# Patient Record
Sex: MALE | Race: WHITE | Employment: UNEMPLOYED | URBAN - METROPOLITAN AREA
[De-identification: names, ages, dates, MRNs, and addresses within clinical notes are randomized per-mention and may not be internally consistent; named-entity substitution may affect disease eponyms.]

---

## 2017-08-23 PROBLEM — Z86.718 HISTORY OF DVT OF LOWER EXTREMITY: Status: ACTIVE | Noted: 2017-08-23

## 2017-08-23 PROBLEM — R06.02 SHORTNESS OF BREATH: Status: ACTIVE | Noted: 2017-08-23

## 2017-12-10 PROBLEM — I26.99 PE (PULMONARY THROMBOEMBOLISM) (HCC): Status: ACTIVE | Noted: 2017-12-10

## 2018-04-06 ENCOUNTER — APPOINTMENT (OUTPATIENT)
Dept: GENERAL RADIOLOGY | Age: 53
End: 2018-04-06
Payer: MEDICAID

## 2018-04-06 ENCOUNTER — HOSPITAL ENCOUNTER (EMERGENCY)
Age: 53
Discharge: HOME | End: 2018-04-07
Attending: EMERGENCY MEDICINE
Payer: MEDICAID

## 2018-04-06 DIAGNOSIS — R06.00 DYSPNEA, UNSPECIFIED TYPE: Primary | ICD-10-CM

## 2018-04-06 LAB
ALBUMIN SERPL-MCNC: 4.2 G/DL (ref 3.5–5.2)
ALP BLD-CCNC: 76 U/L (ref 40–129)
ALT SERPL-CCNC: 20 U/L (ref 0–40)
ANION GAP SERPL CALCULATED.3IONS-SCNC: 14 MMOL/L (ref 7–16)
AST SERPL-CCNC: 18 U/L (ref 0–39)
BACTERIA: ABNORMAL /HPF
BASOPHILS ABSOLUTE: 0.04 E9/L (ref 0–0.2)
BASOPHILS RELATIVE PERCENT: 0.4 % (ref 0–2)
BILIRUB SERPL-MCNC: 0.4 MG/DL (ref 0–1.2)
BILIRUBIN URINE: NEGATIVE
BLOOD, URINE: ABNORMAL
BUN BLDV-MCNC: 20 MG/DL (ref 6–20)
CALCIUM SERPL-MCNC: 9.6 MG/DL (ref 8.6–10.2)
CHLORIDE BLD-SCNC: 102 MMOL/L (ref 98–107)
CLARITY: CLEAR
CO2: 26 MMOL/L (ref 22–29)
COLOR: YELLOW
CREAT SERPL-MCNC: 1 MG/DL (ref 0.7–1.2)
EKG ATRIAL RATE: 85 BPM
EKG P AXIS: 38 DEGREES
EKG P-R INTERVAL: 170 MS
EKG Q-T INTERVAL: 388 MS
EKG QRS DURATION: 100 MS
EKG QTC CALCULATION (BAZETT): 461 MS
EKG R AXIS: 32 DEGREES
EKG T AXIS: 55 DEGREES
EKG VENTRICULAR RATE: 85 BPM
EOSINOPHILS ABSOLUTE: 0.48 E9/L (ref 0.05–0.5)
EOSINOPHILS RELATIVE PERCENT: 5.2 % (ref 0–6)
GFR AFRICAN AMERICAN: >60
GFR NON-AFRICAN AMERICAN: >60 ML/MIN/1.73
GLUCOSE BLD-MCNC: 117 MG/DL (ref 74–109)
GLUCOSE URINE: NEGATIVE MG/DL
HCT VFR BLD CALC: 43.5 % (ref 37–54)
HEMOGLOBIN: 14.6 G/DL (ref 12.5–16.5)
IMMATURE GRANULOCYTES #: 0.03 E9/L
IMMATURE GRANULOCYTES %: 0.3 % (ref 0–5)
INR BLD: 1
KETONES, URINE: NEGATIVE MG/DL
LEUKOCYTE ESTERASE, URINE: NEGATIVE
LYMPHOCYTES ABSOLUTE: 2.59 E9/L (ref 1.5–4)
LYMPHOCYTES RELATIVE PERCENT: 28.2 % (ref 20–42)
MCH RBC QN AUTO: 29 PG (ref 26–35)
MCHC RBC AUTO-ENTMCNC: 33.6 % (ref 32–34.5)
MCV RBC AUTO: 86.5 FL (ref 80–99.9)
MONOCYTES ABSOLUTE: 0.62 E9/L (ref 0.1–0.95)
MONOCYTES RELATIVE PERCENT: 6.8 % (ref 2–12)
NEUTROPHILS ABSOLUTE: 5.41 E9/L (ref 1.8–7.3)
NEUTROPHILS RELATIVE PERCENT: 59.1 % (ref 43–80)
NITRITE, URINE: NEGATIVE
PDW BLD-RTO: 13.7 FL (ref 11.5–15)
PH UA: 5.5 (ref 5–9)
PLATELET # BLD: 221 E9/L (ref 130–450)
PMV BLD AUTO: 8.5 FL (ref 7–12)
POTASSIUM SERPL-SCNC: 4.2 MMOL/L (ref 3.5–5)
PROTEIN UA: NEGATIVE MG/DL
PROTHROMBIN TIME: 11.4 SEC (ref 9.3–12.4)
RBC # BLD: 5.03 E12/L (ref 3.8–5.8)
RBC UA: ABNORMAL /HPF (ref 0–2)
SODIUM BLD-SCNC: 142 MMOL/L (ref 132–146)
SPECIFIC GRAVITY UA: >=1.03 (ref 1–1.03)
TOTAL PROTEIN: 7.2 G/DL (ref 6.4–8.3)
TROPONIN: <0.01 NG/ML (ref 0–0.03)
UROBILINOGEN, URINE: 0.2 E.U./DL
WBC # BLD: 9.2 E9/L (ref 4.5–11.5)
WBC UA: ABNORMAL /HPF (ref 0–5)

## 2018-04-06 PROCEDURE — 85025 COMPLETE CBC W/AUTO DIFF WBC: CPT

## 2018-04-06 PROCEDURE — 99285 EMERGENCY DEPT VISIT HI MDM: CPT

## 2018-04-06 PROCEDURE — 71046 X-RAY EXAM CHEST 2 VIEWS: CPT

## 2018-04-06 PROCEDURE — 36415 COLL VENOUS BLD VENIPUNCTURE: CPT

## 2018-04-06 PROCEDURE — 85610 PROTHROMBIN TIME: CPT

## 2018-04-06 PROCEDURE — 80053 COMPREHEN METABOLIC PANEL: CPT

## 2018-04-06 PROCEDURE — 81001 URINALYSIS AUTO W/SCOPE: CPT

## 2018-04-06 PROCEDURE — 84484 ASSAY OF TROPONIN QUANT: CPT

## 2018-04-07 ENCOUNTER — APPOINTMENT (OUTPATIENT)
Dept: ULTRASOUND IMAGING | Age: 53
End: 2018-04-07
Payer: MEDICAID

## 2018-04-07 ENCOUNTER — APPOINTMENT (OUTPATIENT)
Dept: CT IMAGING | Age: 53
End: 2018-04-07
Payer: MEDICAID

## 2018-04-07 VITALS
TEMPERATURE: 98.1 F | WEIGHT: 300 LBS | HEIGHT: 77 IN | BODY MASS INDEX: 35.42 KG/M2 | SYSTOLIC BLOOD PRESSURE: 132 MMHG | DIASTOLIC BLOOD PRESSURE: 97 MMHG | HEART RATE: 77 BPM | OXYGEN SATURATION: 95 % | RESPIRATION RATE: 18 BRPM

## 2018-04-07 PROCEDURE — 2580000003 HC RX 258: Performed by: RADIOLOGY

## 2018-04-07 PROCEDURE — 6360000002 HC RX W HCPCS

## 2018-04-07 PROCEDURE — 96374 THER/PROPH/DIAG INJ IV PUSH: CPT

## 2018-04-07 PROCEDURE — 93970 EXTREMITY STUDY: CPT

## 2018-04-07 PROCEDURE — 6370000000 HC RX 637 (ALT 250 FOR IP): Performed by: NURSE PRACTITIONER

## 2018-04-07 PROCEDURE — 6360000004 HC RX CONTRAST MEDICATION: Performed by: RADIOLOGY

## 2018-04-07 PROCEDURE — 71275 CT ANGIOGRAPHY CHEST: CPT

## 2018-04-07 RX ORDER — ONDANSETRON 2 MG/ML
INJECTION INTRAMUSCULAR; INTRAVENOUS
Status: COMPLETED
Start: 2018-04-07 | End: 2018-04-07

## 2018-04-07 RX ORDER — SODIUM CHLORIDE 0.9 % (FLUSH) 0.9 %
10 SYRINGE (ML) INJECTION PRN
Status: COMPLETED | OUTPATIENT
Start: 2018-04-07 | End: 2018-04-07

## 2018-04-07 RX ORDER — ONDANSETRON 2 MG/ML
4 INJECTION INTRAMUSCULAR; INTRAVENOUS ONCE
Status: COMPLETED | OUTPATIENT
Start: 2018-04-07 | End: 2018-04-07

## 2018-04-07 RX ADMIN — Medication 10 ML: at 00:52

## 2018-04-07 RX ADMIN — IOPAMIDOL 75 ML: 755 INJECTION, SOLUTION INTRAVENOUS at 00:52

## 2018-04-07 RX ADMIN — ONDANSETRON 4 MG: 2 INJECTION INTRAMUSCULAR; INTRAVENOUS at 01:05

## 2018-04-07 RX ADMIN — APIXABAN 10 MG: 5 TABLET, FILM COATED ORAL at 03:06

## 2018-06-26 ENCOUNTER — OFFICE VISIT (OUTPATIENT)
Dept: VASCULAR SURGERY | Age: 53
End: 2018-06-26
Payer: MEDICAID

## 2018-06-26 DIAGNOSIS — Z86.718 HISTORY OF DVT OF LOWER EXTREMITY: Primary | ICD-10-CM

## 2018-06-26 DIAGNOSIS — I26.99 PE (PULMONARY THROMBOEMBOLISM) (HCC): ICD-10-CM

## 2018-06-26 PROCEDURE — 1036F TOBACCO NON-USER: CPT | Performed by: SURGERY

## 2018-06-26 PROCEDURE — 99203 OFFICE O/P NEW LOW 30 MIN: CPT | Performed by: SURGERY

## 2018-06-26 PROCEDURE — 3017F COLORECTAL CA SCREEN DOC REV: CPT | Performed by: SURGERY

## 2018-06-26 PROCEDURE — G8427 DOCREV CUR MEDS BY ELIG CLIN: HCPCS | Performed by: SURGERY

## 2018-06-26 PROCEDURE — G8417 CALC BMI ABV UP PARAM F/U: HCPCS | Performed by: SURGERY

## 2018-07-23 ENCOUNTER — HOSPITAL ENCOUNTER (OUTPATIENT)
Dept: GENERAL RADIOLOGY | Age: 53
Discharge: HOME | End: 2018-07-25
Payer: MEDICAID

## 2018-07-23 DIAGNOSIS — N64.4 BREAST PAIN: ICD-10-CM

## 2018-07-23 DIAGNOSIS — N63.20 LUMP OF LEFT BREAST: ICD-10-CM

## 2018-07-23 PROCEDURE — 76642 ULTRASOUND BREAST LIMITED: CPT

## 2018-07-23 PROCEDURE — 77066 DX MAMMO INCL CAD BI: CPT

## 2018-08-22 ENCOUNTER — TELEPHONE (OUTPATIENT)
Dept: INTERNAL MEDICINE | Age: 53
End: 2018-08-22

## 2018-10-23 ENCOUNTER — HOSPITAL ENCOUNTER (OUTPATIENT)
Dept: SLEEP CENTER | Age: 53
Discharge: HOME | End: 2018-10-23
Payer: MEDICAID

## 2018-10-23 DIAGNOSIS — E66.9 CLASS 2 OBESITY WITH BODY MASS INDEX (BMI) OF 36.0 TO 36.9 IN ADULT, UNSPECIFIED OBESITY TYPE, UNSPECIFIED WHETHER SERIOUS COMORBIDITY PRESENT: ICD-10-CM

## 2018-10-23 DIAGNOSIS — R06.02 SHORTNESS OF BREATH: ICD-10-CM

## 2018-10-23 DIAGNOSIS — R06.89 GASPING FOR BREATH: ICD-10-CM

## 2018-10-23 DIAGNOSIS — F32.A DEPRESSION, UNSPECIFIED DEPRESSION TYPE: ICD-10-CM

## 2018-10-23 DIAGNOSIS — R06.83 SNORES: ICD-10-CM

## 2018-10-23 DIAGNOSIS — I10 HYPERTENSION, UNSPECIFIED TYPE: ICD-10-CM

## 2018-10-23 DIAGNOSIS — M50.30 BULGE OF CERVICAL DISC WITHOUT MYELOPATHY: Chronic | ICD-10-CM

## 2018-10-23 DIAGNOSIS — J44.9 CHRONIC OBSTRUCTIVE PULMONARY DISEASE, UNSPECIFIED COPD TYPE (HCC): Primary | ICD-10-CM

## 2018-10-23 PROCEDURE — 95811 POLYSOM 6/>YRS CPAP 4/> PARM: CPT

## 2018-10-24 VITALS
HEART RATE: 79 BPM | SYSTOLIC BLOOD PRESSURE: 154 MMHG | WEIGHT: 315 LBS | OXYGEN SATURATION: 92 % | BODY MASS INDEX: 37.19 KG/M2 | HEIGHT: 77 IN | DIASTOLIC BLOOD PRESSURE: 92 MMHG

## 2018-10-24 ASSESSMENT — SLEEP AND FATIGUE QUESTIONNAIRES
HOW LIKELY ARE YOU TO NOD OFF OR FALL ASLEEP IN A CAR, WHILE STOPPED FOR A FEW MINUTES IN TRAFFIC: 0
HOW LIKELY ARE YOU TO NOD OFF OR FALL ASLEEP WHILE SITTING QUIETLY AFTER LUNCH WITHOUT ALCOHOL: 2
HOW LIKELY ARE YOU TO NOD OFF OR FALL ASLEEP WHILE SITTING INACTIVE IN A PUBLIC PLACE: 1
HOW LIKELY ARE YOU TO NOD OFF OR FALL ASLEEP WHILE LYING DOWN TO REST IN THE AFTERNOON WHEN CIRCUMSTANCES PERMIT: 3
HOW LIKELY ARE YOU TO NOD OFF OR FALL ASLEEP WHEN YOU ARE A PASSENGER IN A CAR FOR AN HOUR WITHOUT A BREAK: 1
HOW LIKELY ARE YOU TO NOD OFF OR FALL ASLEEP WHILE SITTING AND READING: 2
HOW LIKELY ARE YOU TO NOD OFF OR FALL ASLEEP WHILE SITTING AND TALKING TO SOMEONE: 0
HOW LIKELY ARE YOU TO NOD OFF OR FALL ASLEEP WHILE WATCHING TV: 2
ESS TOTAL SCORE: 11

## 2018-10-29 NOTE — PROGRESS NOTES
[address] [address]                              SLEEP STUDY REPORT    PATIENT NAME: Ryley Huang                    :         1965  MED REC NO:   [id number]                            ROOM:  ACCOUNT NO:   [de-identified]                           ADMIT DATE:  10/23/2018  PROVIDER:     Iza Hargrove MD    DATE OF STUDY:  10/23/2018    ORDERING PHYSICIAN:  _____, nurse practitioner. The patient is 50year-old, 77 inches tall, 329 pounds. BMI is 39. The patient has known obstructive sleep apnea, difficulty falling  asleep and maintaining sleep. He has history of snoring, gasping,  nonrestorative sleep, excessive napping, trouble with memory, back  twitches, sleep walking, talking, waking confused, vivid dreams with  racing thoughts, falling asleep on the phone. The patient is on  albuterol, Eliquis, Abilify, Breo, Pulmicort, BuSpar, Zyrtec, Flonase,  Neurontin, Atrovent, lisinopril, Dulera, Ultram, and Ventolin. REPORT IN DETAIL:  This part of the study was done in two parts, one  for the diagnosis and the second was treatment. DIAGNOSTIC STUDY:  Total recording time 139 minutes, out of which  total sleep time was 132 minutes resulting in sleep efficiency of 95%. The patient had sleep onset of 0.3 minutes, was awake for another 7.3  minutes after sleep onset. REM latency was markedly short at 53  minutes. The patient spent 16% of the study in stage I sleep, 72% in  stage II sleep, 1% in stage III sleep, 11% in REM sleep. SLEEP-DISORDERED BREATHING:  The patient had a 126 events during the  night, which consisted of 7 obstructive apneas, 1 mixed apnea, 117  hypopneas for an apnea index of 3.7. Hypopnea index of 53.4. Total  apnea/hypopnea index was 57.5. These events lasted anywhere from 25  seconds to 64 seconds in duration. REM index was 64.   The patient  slept in the supine position for only 8

## 2018-11-05 ENCOUNTER — HOSPITAL ENCOUNTER (EMERGENCY)
Age: 53
Discharge: HOME | End: 2018-11-05
Payer: MEDICAID

## 2018-11-05 ENCOUNTER — APPOINTMENT (OUTPATIENT)
Dept: GENERAL RADIOLOGY | Age: 53
End: 2018-11-05
Payer: MEDICAID

## 2018-11-05 VITALS
HEART RATE: 96 BPM | TEMPERATURE: 98.3 F | RESPIRATION RATE: 18 BRPM | SYSTOLIC BLOOD PRESSURE: 118 MMHG | DIASTOLIC BLOOD PRESSURE: 73 MMHG | HEIGHT: 77 IN | BODY MASS INDEX: 37.19 KG/M2 | OXYGEN SATURATION: 94 % | WEIGHT: 315 LBS

## 2018-11-05 DIAGNOSIS — S93.602A FOOT SPRAIN, LEFT, INITIAL ENCOUNTER: Primary | ICD-10-CM

## 2018-11-05 PROCEDURE — 6370000000 HC RX 637 (ALT 250 FOR IP): Performed by: NURSE PRACTITIONER

## 2018-11-05 PROCEDURE — 73630 X-RAY EXAM OF FOOT: CPT

## 2018-11-05 PROCEDURE — 73610 X-RAY EXAM OF ANKLE: CPT

## 2018-11-05 PROCEDURE — 99283 EMERGENCY DEPT VISIT LOW MDM: CPT

## 2018-11-05 RX ORDER — NAPROXEN 500 MG/1
500 TABLET ORAL 2 TIMES DAILY
Qty: 14 TABLET | Refills: 0 | Status: ON HOLD | OUTPATIENT
Start: 2018-11-05 | End: 2020-09-13 | Stop reason: HOSPADM

## 2018-11-05 RX ORDER — IBUPROFEN 800 MG/1
800 TABLET ORAL ONCE
Status: COMPLETED | OUTPATIENT
Start: 2018-11-05 | End: 2018-11-05

## 2018-11-05 RX ADMIN — IBUPROFEN 800 MG: 800 TABLET ORAL at 16:03

## 2018-11-05 ASSESSMENT — PAIN DESCRIPTION - FREQUENCY
FREQUENCY: CONTINUOUS
FREQUENCY: CONTINUOUS

## 2018-11-05 ASSESSMENT — PAIN DESCRIPTION - ORIENTATION
ORIENTATION: LEFT
ORIENTATION: LEFT

## 2018-11-05 ASSESSMENT — PAIN DESCRIPTION - PAIN TYPE
TYPE: ACUTE PAIN
TYPE: ACUTE PAIN

## 2018-11-05 ASSESSMENT — PAIN DESCRIPTION - DESCRIPTORS
DESCRIPTORS: ACHING
DESCRIPTORS: ACHING

## 2018-11-05 ASSESSMENT — PAIN DESCRIPTION - PROGRESSION: CLINICAL_PROGRESSION: GRADUALLY IMPROVING

## 2018-11-05 ASSESSMENT — PAIN DESCRIPTION - LOCATION
LOCATION: ANKLE
LOCATION: ANKLE;FOOT

## 2018-11-05 ASSESSMENT — PAIN SCALES - GENERAL
PAINLEVEL_OUTOF10: 7
PAINLEVEL_OUTOF10: 5

## 2018-11-06 NOTE — ED PROVIDER NOTES
[facility]     Department of Emergency Medicine   ED  Provider Note  Admit Date/RoomTime: 11/5/2018  3:17 PM  ED Room: 06/06   Chief Complaint   Fall (stepped off a curb and fell, injured left foot and ankle onset 2 weeks ago.)    History of Present Illness   Source of history provided by:  patient. History/Exam Limitations: none. Erica Herrera is a 50 y.o. old male presenting to the emergency department by private vehicle, for Left foot pain which occured 2 week(s) prior to arrival.  Cause of complaint: inversion injury to foot while at home. There has been a history of no prior problems with this area in the past.  Since onset the symptoms have been moderate in degree with ability to bear weight, but with some pain and swelling. His pain is aggraveated by walking and relieved by nothing but has not taken anything. Tetanus Status: unknown. ROS    Pertinent positives and negatives are stated within HPI, all other systems reviewed and are negative. Past Surgical History:   Procedure Laterality Date    ARTERY BIOPSY  09/11    Aorta  fibrosis    CARDIAC CATHETERIZATION  2002    Negative    CARDIOVASCULAR STRESS TEST  2002    positive heart cath OK      NERVE BLOCK N/A 1/9/15    cervical epidural #2    NERVE BLOCK  01/23/15    cervical radiculitis    OTHER SURGICAL HISTORY N/A 12/19/14    cervical epidural nerve block under xray #1    UVULOPALATOPHARYGOPLASTY  2000   Social History:  reports that he quit smoking about 4 months ago. His smoking use included Cigarettes. He has a 52.00 pack-year smoking history. He has quit using smokeless tobacco. He reports that he uses drugs, including  and Marijuana. He reports that he does not drink alcohol. Family History: family history includes Cancer in his mother; Diabetes in his brother and mother; Heart Disease in his brother, father, and mother; Kidney Disease in his sister; Stroke in his mother.   Allergies: Patient has no known allergies. Physical Exam           ED Triage Vitals   BP Temp Temp Source Pulse Resp SpO2 Height Weight   11/05/18 1523 11/05/18 1523 11/05/18 1523 11/05/18 1523 11/05/18 1523 11/05/18 1523 11/05/18 1522 11/05/18 1522   (!) 210/120 98.3 °F (36.8 °C) Oral 84 17 93 % 6' 5\" (1.956 m) (!) 320 lb (145.2 kg)      Oxygen Saturation Interpretation: Normal.    Constitutional:  Alert, development consistent with age. Neck:  Normal ROM. Supple. Foot: Left medial and plantar 1st , 2nd, 3rd metatarsal(s). Tenderness:  mild. Swelling: Mild. Deformity: no.              ROM: full range with pain. Skin:  tenderness, swelling and no erythema, rash or wounds noted. Neurovascular: Motor deficit: none. Sensory deficit:   none. Pulse deficit: none. Capillary refill: normal.  Ankle:               Tenderness:  none. Swelling: None. Deformity: no.             ROM: full range of motion. Skin:  no erythema, rash or wounds noted. Gait:  limp. Lymphatics: No lymphangitis or adenopathy noted. Neurological:  Oriented. Motor functions intact. Lab / Imaging Results   (All laboratory and radiology results have been personally reviewed by myself)  Labs:  No results found for this visit on 11/05/18. Imaging: All Radiology results interpreted by Radiologist unless otherwise noted. XR FOOT LEFT (MIN 3 VIEWS)   Final Result   Negative. XR ANKLE LEFT (MIN 3 VIEWS)   Final Result   No acute fracture or malalignment      Mild bimalleolar soft tissue swelling. ED Course / Medical Decision Making     Medications   ibuprofen (ADVIL;MOTRIN) tablet 800 mg (800 mg Oral Given 11/5/18 1603)     ED Course as of Nov 06 1140   Mon Nov 05, 2018   1649 Sleeping, easily aroused. Some improvement in symptoms. Discussed results and plan.    [JG]      ED Course User Index  [JG] ALIE Berumen NP

## 2019-07-26 ENCOUNTER — HOSPITAL ENCOUNTER (EMERGENCY)
Age: 54
Discharge: HOME | End: 2019-07-27
Payer: MEDICAID

## 2019-07-26 DIAGNOSIS — F11.90 METHADONE USE: ICD-10-CM

## 2019-07-26 DIAGNOSIS — F11.10 OPIATE ABUSE, EPISODIC (HCC): ICD-10-CM

## 2019-07-26 DIAGNOSIS — G47.9 SLEEP DISTURBANCE: Primary | ICD-10-CM

## 2019-07-26 DIAGNOSIS — F14.10 COCAINE ABUSE (HCC): ICD-10-CM

## 2019-07-26 DIAGNOSIS — F15.10 AMPHETAMINE ABUSE (HCC): ICD-10-CM

## 2019-07-26 DIAGNOSIS — G47.30 SLEEP APNEA, UNSPECIFIED TYPE: ICD-10-CM

## 2019-07-26 PROCEDURE — 99284 EMERGENCY DEPT VISIT MOD MDM: CPT

## 2019-07-27 ENCOUNTER — APPOINTMENT (OUTPATIENT)
Dept: GENERAL RADIOLOGY | Age: 54
End: 2019-07-27
Payer: MEDICAID

## 2019-07-27 ENCOUNTER — APPOINTMENT (OUTPATIENT)
Dept: CT IMAGING | Age: 54
End: 2019-07-27
Payer: MEDICAID

## 2019-07-27 VITALS
HEART RATE: 86 BPM | RESPIRATION RATE: 20 BRPM | HEIGHT: 77 IN | OXYGEN SATURATION: 94 % | DIASTOLIC BLOOD PRESSURE: 88 MMHG | SYSTOLIC BLOOD PRESSURE: 155 MMHG | TEMPERATURE: 98.1 F | WEIGHT: 315 LBS | BODY MASS INDEX: 37.19 KG/M2

## 2019-07-27 LAB
ACETAMINOPHEN LEVEL: <5 MCG/ML (ref 10–30)
ALBUMIN SERPL-MCNC: 3.9 G/DL (ref 3.5–5.2)
ALP BLD-CCNC: 72 U/L (ref 40–129)
ALT SERPL-CCNC: 36 U/L (ref 0–40)
AMPHETAMINE SCREEN, URINE: POSITIVE
ANION GAP SERPL CALCULATED.3IONS-SCNC: 9 MMOL/L (ref 7–16)
AST SERPL-CCNC: 33 U/L (ref 0–39)
BACTERIA: NORMAL /HPF
BARBITURATE SCREEN URINE: NOT DETECTED
BASOPHILS ABSOLUTE: 0.04 E9/L (ref 0–0.2)
BASOPHILS RELATIVE PERCENT: 0.3 % (ref 0–2)
BENZODIAZEPINE SCREEN, URINE: NOT DETECTED
BILIRUB SERPL-MCNC: 0.4 MG/DL (ref 0–1.2)
BILIRUBIN URINE: NEGATIVE
BLOOD, URINE: NORMAL
BUN BLDV-MCNC: 18 MG/DL (ref 6–20)
CALCIUM SERPL-MCNC: 9 MG/DL (ref 8.6–10.2)
CANNABINOID SCREEN URINE: NOT DETECTED
CHLORIDE BLD-SCNC: 103 MMOL/L (ref 98–107)
CLARITY: CLEAR
CO2: 27 MMOL/L (ref 22–29)
COCAINE METABOLITE SCREEN URINE: POSITIVE
COLOR: YELLOW
CREAT SERPL-MCNC: 1 MG/DL (ref 0.7–1.2)
EOSINOPHILS ABSOLUTE: 0.55 E9/L (ref 0.05–0.5)
EOSINOPHILS RELATIVE PERCENT: 4.8 % (ref 0–6)
ETHANOL: <10 MG/DL (ref 0–0.08)
GFR AFRICAN AMERICAN: >60
GFR NON-AFRICAN AMERICAN: >60 ML/MIN/1.73
GLUCOSE BLD-MCNC: 100 MG/DL (ref 74–99)
GLUCOSE URINE: NEGATIVE MG/DL
HCT VFR BLD CALC: 39.3 % (ref 37–54)
HEMOGLOBIN: 12.8 G/DL (ref 12.5–16.5)
IMMATURE GRANULOCYTES #: 0.03 E9/L
IMMATURE GRANULOCYTES %: 0.3 % (ref 0–5)
KETONES, URINE: NEGATIVE MG/DL
LEUKOCYTE ESTERASE, URINE: NEGATIVE
LYMPHOCYTES ABSOLUTE: 2.78 E9/L (ref 1.5–4)
LYMPHOCYTES RELATIVE PERCENT: 24.2 % (ref 20–42)
MCH RBC QN AUTO: 29.4 PG (ref 26–35)
MCHC RBC AUTO-ENTMCNC: 32.6 % (ref 32–34.5)
MCV RBC AUTO: 90.1 FL (ref 80–99.9)
METHADONE SCREEN, URINE: POSITIVE
MONOCYTES ABSOLUTE: 0.71 E9/L (ref 0.1–0.95)
MONOCYTES RELATIVE PERCENT: 6.2 % (ref 2–12)
NEUTROPHILS ABSOLUTE: 7.39 E9/L (ref 1.8–7.3)
NEUTROPHILS RELATIVE PERCENT: 64.2 % (ref 43–80)
NITRITE, URINE: NEGATIVE
OPIATE SCREEN URINE: POSITIVE
PDW BLD-RTO: 14 FL (ref 11.5–15)
PH UA: 6 (ref 5–9)
PHENCYCLIDINE SCREEN URINE: NOT DETECTED
PLATELET # BLD: 225 E9/L (ref 130–450)
PMV BLD AUTO: 9 FL (ref 7–12)
POTASSIUM SERPL-SCNC: 4.5 MMOL/L (ref 3.5–5)
PRO-BNP: 273 PG/ML (ref 0–125)
PROPOXYPHENE SCREEN: NOT DETECTED
PROTEIN UA: NEGATIVE MG/DL
RBC # BLD: 4.36 E12/L (ref 3.8–5.8)
RBC UA: NORMAL /HPF (ref 0–2)
RENAL EPITHELIAL, UA: NORMAL /HPF
SALICYLATE, SERUM: <0.3 MG/DL (ref 0–30)
SODIUM BLD-SCNC: 139 MMOL/L (ref 132–146)
SPECIFIC GRAVITY UA: 1.01 (ref 1–1.03)
TOTAL PROTEIN: 7 G/DL (ref 6.4–8.3)
TRICYCLIC ANTIDEPRESSANTS SCREEN SERUM: NEGATIVE NG/ML
TROPONIN: <0.01 NG/ML (ref 0–0.03)
TSH SERPL DL<=0.05 MIU/L-ACNC: 0.73 UIU/ML (ref 0.27–4.2)
UROBILINOGEN, URINE: 0.2 E.U./DL
WBC # BLD: 11.5 E9/L (ref 4.5–11.5)
WBC UA: NORMAL /HPF (ref 0–5)

## 2019-07-27 PROCEDURE — 84443 ASSAY THYROID STIM HORMONE: CPT

## 2019-07-27 PROCEDURE — 84484 ASSAY OF TROPONIN QUANT: CPT

## 2019-07-27 PROCEDURE — G0480 DRUG TEST DEF 1-7 CLASSES: HCPCS

## 2019-07-27 PROCEDURE — 80053 COMPREHEN METABOLIC PANEL: CPT

## 2019-07-27 PROCEDURE — 85025 COMPLETE CBC W/AUTO DIFF WBC: CPT

## 2019-07-27 PROCEDURE — 81001 URINALYSIS AUTO W/SCOPE: CPT

## 2019-07-27 PROCEDURE — 93005 ELECTROCARDIOGRAM TRACING: CPT | Performed by: NURSE PRACTITIONER

## 2019-07-27 PROCEDURE — 83880 ASSAY OF NATRIURETIC PEPTIDE: CPT

## 2019-07-27 PROCEDURE — 80307 DRUG TEST PRSMV CHEM ANLYZR: CPT

## 2019-07-27 PROCEDURE — 71045 X-RAY EXAM CHEST 1 VIEW: CPT

## 2019-07-27 PROCEDURE — 70450 CT HEAD/BRAIN W/O DYE: CPT

## 2019-07-27 NOTE — ED PROVIDER NOTES
Anion Gap 9 7 - 16 mmol/L    Glucose 100 (H) 74 - 99 mg/dL    BUN 18 6 - 20 mg/dL    CREATININE 1.0 0.7 - 1.2 mg/dL    GFR Non-African American >60 >=60 mL/min/1.73    GFR African American >60     Calcium 9.0 8.6 - 10.2 mg/dL    Total Protein 7.0 6.4 - 8.3 g/dL    Alb 3.9 3.5 - 5.2 g/dL    Total Bilirubin 0.4 0.0 - 1.2 mg/dL    Alkaline Phosphatase 72 40 - 129 U/L    ALT 36 0 - 40 U/L    AST 33 0 - 39 U/L   Brain Natriuretic Peptide   Result Value Ref Range    Pro- (H) 0 - 125 pg/mL   Urinalysis   Result Value Ref Range    Color, UA Yellow Straw/Yellow    Clarity, UA Clear Clear    Glucose, Ur Negative Negative mg/dL    Bilirubin Urine Negative Negative    Ketones, Urine Negative Negative mg/dL    Specific Gravity, UA 1.015 1.005 - 1.030    Blood, Urine TRACE-INTACT Negative    pH, UA 6.0 5.0 - 9.0    Protein, UA Negative Negative mg/dL    Urobilinogen, Urine 0.2 <2.0 E.U./dL    Nitrite, Urine Negative Negative    Leukocyte Esterase, Urine Negative Negative   TSH without Reflex   Result Value Ref Range    TSH 0.735 0.270 - 4.200 uIU/mL   Urine Drug Screen   Result Value Ref Range    Amphetamine Screen, Urine POSITIVE (A) Negative <1000 ng/mL    Barbiturate Screen, Ur NOT DETECTED Negative < 200 ng/mL    Benzodiazepine Screen, Urine NOT DETECTED Negative < 200 ng/mL    Cannabinoid Scrn, Ur NOT DETECTED Negative < 50ng/mL    Cocaine Metabolite Screen, Urine POSITIVE (A) Negative < 300 ng/mL    Opiate Scrn, Ur POSITIVE (A) Negative < 300ng/mL    PCP Screen, Urine NOT DETECTED Negative < 25 ng/mL    Methadone Screen, Urine POSITIVE (A) Negative <300 ng/mL    Propoxyphene Scrn, Ur NOT DETECTED Negative <300 ng/mL   Serum Drug Screen   Result Value Ref Range    Ethanol Lvl <10 mg/dL    Acetaminophen Level <5.0 (L) 10.0 - 28.2 mcg/mL    Salicylate, Serum <4.9 0.0 - 30.0 mg/dL   Microscopic Urinalysis   Result Value Ref Range    WBC, UA 0-1 0 - 5 /HPF    RBC, UA 1-3 0 - 2 /HPF    Renal Epithelial, Urine NONE /HPF Bacteria, UA NONE /HPF   EKG 12 Lead   Result Value Ref Range    Ventricular Rate 69 BPM    Atrial Rate 69 BPM    P-R Interval 172 ms    QRS Duration 96 ms    Q-T Interval 420 ms    QTc Calculation (Bazett) 450 ms    P Axis 23 degrees    R Axis 14 degrees    T Axis 29 degrees       RADIOLOGY:  Interpreted by Radiologist.  CT Head WO Contrast   Final Result   Normal.       This report has been electronically signed by Nereida Ceballos MD.      XR CHEST PORTABLE   Final Result   No acute cardiac pulmonary process. ------------------------- NURSING NOTES AND VITALS REVIEWED ---------------------------   The nursing notes within the ED encounter and vital signs as below have been reviewed. BP (!) 141/90   Pulse 68   Temp 98.1 °F (36.7 °C) (Oral)   Resp 20   Ht 6' 5\" (1.956 m)   Wt (!) 345 lb (156.5 kg)   SpO2 95%   BMI 40.91 kg/m²   Oxygen Saturation Interpretation: Normal      ---------------------------------------------------PHYSICAL EXAM--------------------------------------      Constitutional/General: Alert and oriented x3, well appearing, non toxic in NAD  Head: Normocephalic and atraumatic  Eyes: PERRL, EOMI  Mouth: Oropharynx clear, handling secretions, no trismus  Neck: Supple, full ROM,   Pulmonary: Lungs clear to auscultation bilaterally, no wheezes, rales, or rhonchi. Not in respiratory distress, , No wheezing no stridor noted. No airway compromise noted. Cardiovascular:  Regular rate and rhythm, no murmurs, gallops, or rubs. 2+ distal pulses  Abdomen: Soft, non tender, non distended,   Extremities: Moves all extremities x 4. Warm and well perfused  Skin: warm and dry without rash  Neurologic: GCS 15,  Psych: Restless Affect, noted to be sleeping in short intervals here in the emergency department then will wake it up stating that he has not slept yet. Redirection provided.   Denies suicidal homicidal ideations denies any hallucinations.      ------------------------------ ED COURSE/MEDICAL DECISION MAKING----------------------  Medications - No data to display      ED COURSE:       Medical Decision Making: Patient with inability to sleep. No noted complaints of chest pain, shortness of breath or abdominal pain denies any psychiatric concerns any stressors. Reports recent setting changes on his CPAP and thinks it may be related to this. Patient denies any recent long travel. Plan will be for labs. Urinalysis negative, troponin negative, CBC negative, TSH normal, proBNP essentially unremarkable at 273, serum drug screen negative. CT scan of the brain is negative, chest x-ray completely unremarkable. Twelve-lead EKG interpreted showed heart rate of 69, normal sinus rhythm. No acute ST elevation or depression noted. Urine drug screen was obtained it is showing positive for amphetamines, cocaine, opiates, methadone. Patient was questioned regarding all of this he states that he did take Adderall because he was not getting any sleep and needed to go to work. He reports that he has done other drugs in the past but states that none of this drug use is the reason why he cannot sleep he has never had that happen to him before. Patient was educated on his polysubstance abuse. Patient was provided with referral to [facility]. Patient also made aware to notify the makers who are in charge of his CPAP machine to continue to provide adjustment to his settings so that he may have a more restful night sleep. Patient will not be sent home with any prescriptions due to his polysubstance drug abuse. Patient will be discharged home he is hemodynamically and neurovascularly intact. There is absolutely no airway compromise noted.,  No wheezing no stridor noted. Patient does have known sleep apnea. But in the setting of polysubstance drug abuse patient with sleep deprivation due to his drug use.   Vitals on discharge 155/88, heart rate 86, temp 98.1, respiratory rate 20, pulse ox 94% on room air. Counseling: The emergency provider has spoken with the patient and discussed todays results, in addition to providing specific details for the plan of care and counseling regarding the diagnosis and prognosis. Questions are answered at this time and they are agreeable with the plan.      --------------------------------- IMPRESSION AND DISPOSITION ---------------------------------    IMPRESSION  1. Sleep disturbance    2. Sleep apnea, unspecified type    3. Cocaine abuse ([facility]    4. Methadone use ([facility])    5. Opiate abuse, episodic ([facility]    6. Amphetamine abuse ([facility]        DISPOSITION  Disposition: Discharge to home  Patient condition is good      NOTE: This report was transcribed using voice recognition software.  Every effort was made to ensure accuracy; however, inadvertent computerized transcription errors may be present     ALIE Jones CNP  07/27/19 1960

## 2019-07-28 LAB
EKG ATRIAL RATE: 69 BPM
EKG P AXIS: 23 DEGREES
EKG P-R INTERVAL: 172 MS
EKG Q-T INTERVAL: 420 MS
EKG QRS DURATION: 96 MS
EKG QTC CALCULATION (BAZETT): 450 MS
EKG R AXIS: 14 DEGREES
EKG T AXIS: 29 DEGREES
EKG VENTRICULAR RATE: 69 BPM

## 2019-07-28 PROCEDURE — 93010 ELECTROCARDIOGRAM REPORT: CPT | Performed by: INTERNAL MEDICINE

## 2019-08-01 LAB
6AM URINE: 49 NG/ML
AMPHETAMINES, URINE: 330 NG/ML
COCAINE, CONFIRM, URINE: >1000 NG/ML
CODEINE, URINE: 122 NG/ML
EDDP, URINE: 1962 NG/ML
HYDROCODONE, URINE: <20 NG/ML
HYDROMORPHONE, URINE: <20 NG/ML
METHADONE, URINE: 2756 NG/ML
METHAMPHETAMINE, URINE: 718 NG/ML
METHYLENEDIOXYAMPHETAMINE, UR: <200 NG/ML
METHYLENEDIOXYETHYLAMPHETAMINE, UR: <200 NG/ML
METHYLENEDIOXYMETHAMPHETAMINE, UR: <200 NG/ML
MORPHINE URINE: 902 NG/ML
NORHYDROCODONE, URINE: <20 NG/ML
NOROXYCODONE, URINE: <20 NG/ML
NOROXYMORPHONE, URINE: <20 NG/ML
OXYCODONE, URINE CONFIRMATION: <20 NG/ML
OXYMORPHONE, URINE: <20 NG/ML

## 2020-05-23 ENCOUNTER — APPOINTMENT (OUTPATIENT)
Dept: GENERAL RADIOLOGY | Age: 55
End: 2020-05-23
Payer: MEDICAID

## 2020-05-23 ENCOUNTER — HOSPITAL ENCOUNTER (EMERGENCY)
Age: 55
Discharge: HOME | End: 2020-05-23
Attending: EMERGENCY MEDICINE
Payer: MEDICAID

## 2020-05-23 VITALS
HEART RATE: 78 BPM | RESPIRATION RATE: 18 BRPM | TEMPERATURE: 97.6 F | OXYGEN SATURATION: 93 % | DIASTOLIC BLOOD PRESSURE: 85 MMHG | SYSTOLIC BLOOD PRESSURE: 129 MMHG

## 2020-05-23 LAB
ANION GAP SERPL CALCULATED.3IONS-SCNC: 11 MMOL/L (ref 7–16)
BASOPHILS ABSOLUTE: 0.03 E9/L (ref 0–0.2)
BASOPHILS RELATIVE PERCENT: 0.3 % (ref 0–2)
BUN BLDV-MCNC: 19 MG/DL (ref 6–20)
CALCIUM SERPL-MCNC: 9 MG/DL (ref 8.6–10.2)
CHLORIDE BLD-SCNC: 101 MMOL/L (ref 98–107)
CO2: 29 MMOL/L (ref 22–29)
CREAT SERPL-MCNC: 1.3 MG/DL (ref 0.7–1.2)
EOSINOPHILS ABSOLUTE: 0.6 E9/L (ref 0.05–0.5)
EOSINOPHILS RELATIVE PERCENT: 6.3 % (ref 0–6)
GFR AFRICAN AMERICAN: >60
GFR NON-AFRICAN AMERICAN: 58 ML/MIN/1.73
GLUCOSE BLD-MCNC: 103 MG/DL (ref 74–99)
HCT VFR BLD CALC: 42.2 % (ref 37–54)
HEMOGLOBIN: 13.4 G/DL (ref 12.5–16.5)
IMMATURE GRANULOCYTES #: 0.05 E9/L
IMMATURE GRANULOCYTES %: 0.5 % (ref 0–5)
LYMPHOCYTES ABSOLUTE: 2.16 E9/L (ref 1.5–4)
LYMPHOCYTES RELATIVE PERCENT: 22.5 % (ref 20–42)
MCH RBC QN AUTO: 28.9 PG (ref 26–35)
MCHC RBC AUTO-ENTMCNC: 31.8 % (ref 32–34.5)
MCV RBC AUTO: 90.9 FL (ref 80–99.9)
MONOCYTES ABSOLUTE: 0.55 E9/L (ref 0.1–0.95)
MONOCYTES RELATIVE PERCENT: 5.7 % (ref 2–12)
NEUTROPHILS ABSOLUTE: 6.19 E9/L (ref 1.8–7.3)
NEUTROPHILS RELATIVE PERCENT: 64.7 % (ref 43–80)
PDW BLD-RTO: 14.3 FL (ref 11.5–15)
PLATELET # BLD: 208 E9/L (ref 130–450)
PMV BLD AUTO: 8.7 FL (ref 7–12)
POTASSIUM REFLEX MAGNESIUM: 4.7 MMOL/L (ref 3.5–5)
PRO-BNP: 55 PG/ML (ref 0–125)
RBC # BLD: 4.64 E12/L (ref 3.8–5.8)
SODIUM BLD-SCNC: 141 MMOL/L (ref 132–146)
TROPONIN: <0.01 NG/ML (ref 0–0.03)
WBC # BLD: 9.6 E9/L (ref 4.5–11.5)

## 2020-05-23 PROCEDURE — 36415 COLL VENOUS BLD VENIPUNCTURE: CPT

## 2020-05-23 PROCEDURE — 80048 BASIC METABOLIC PNL TOTAL CA: CPT

## 2020-05-23 PROCEDURE — 6360000002 HC RX W HCPCS: Performed by: EMERGENCY MEDICINE

## 2020-05-23 PROCEDURE — 84484 ASSAY OF TROPONIN QUANT: CPT

## 2020-05-23 PROCEDURE — 85025 COMPLETE CBC W/AUTO DIFF WBC: CPT

## 2020-05-23 PROCEDURE — 71046 X-RAY EXAM CHEST 2 VIEWS: CPT

## 2020-05-23 PROCEDURE — 83880 ASSAY OF NATRIURETIC PEPTIDE: CPT

## 2020-05-23 PROCEDURE — 99283 EMERGENCY DEPT VISIT LOW MDM: CPT

## 2020-05-23 PROCEDURE — 96374 THER/PROPH/DIAG INJ IV PUSH: CPT

## 2020-05-23 RX ORDER — FUROSEMIDE 20 MG/1
20 TABLET ORAL DAILY
Qty: 5 TABLET | Refills: 0 | Status: ON HOLD | OUTPATIENT
Start: 2020-05-23 | End: 2020-09-13 | Stop reason: HOSPADM

## 2020-05-23 RX ORDER — FUROSEMIDE 10 MG/ML
40 INJECTION INTRAMUSCULAR; INTRAVENOUS ONCE
Status: COMPLETED | OUTPATIENT
Start: 2020-05-23 | End: 2020-05-23

## 2020-05-23 RX ADMIN — FUROSEMIDE 40 MG: 10 INJECTION, SOLUTION INTRAMUSCULAR; INTRAVENOUS at 02:13

## 2020-05-23 ASSESSMENT — PAIN SCALES - GENERAL: PAINLEVEL_OUTOF10: 7

## 2020-05-23 ASSESSMENT — PAIN DESCRIPTION - DESCRIPTORS: DESCRIPTORS: BURNING

## 2020-05-23 ASSESSMENT — PAIN DESCRIPTION - PAIN TYPE: TYPE: ACUTE PAIN

## 2020-05-23 ASSESSMENT — PAIN DESCRIPTION - LOCATION: LOCATION: LEG

## 2020-05-23 ASSESSMENT — PAIN DESCRIPTION - ORIENTATION: ORIENTATION: LEFT;RIGHT;LOWER
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
## 2020-09-08 ENCOUNTER — HOSPITAL ENCOUNTER (INPATIENT)
Age: 55
LOS: 5 days | Discharge: LEFT BEFORE DISCHARGE | DRG: 816 | End: 2020-09-13
Attending: EMERGENCY MEDICINE | Admitting: EMERGENCY MEDICINE
Payer: MEDICAID

## 2020-09-08 ENCOUNTER — APPOINTMENT (OUTPATIENT)
Dept: GENERAL RADIOLOGY | Age: 55
DRG: 816 | End: 2020-09-08
Payer: MEDICAID

## 2020-09-08 ENCOUNTER — APPOINTMENT (OUTPATIENT)
Dept: CT IMAGING | Age: 55
DRG: 816 | End: 2020-09-08
Payer: MEDICAID

## 2020-09-08 PROBLEM — R06.03 ACUTE RESPIRATORY DISTRESS: Status: ACTIVE | Noted: 2020-09-08

## 2020-09-08 LAB
AADO2: 480.2 MMHG
AADO2: 525.4 MMHG
AADO2: 533.7 MMHG
AADO2: 561.1 MMHG
ACETAMINOPHEN LEVEL: <5 MCG/ML (ref 10–30)
ALBUMIN SERPL-MCNC: 3.9 G/DL (ref 3.5–5.2)
ALP BLD-CCNC: 72 U/L (ref 40–129)
ALT SERPL-CCNC: 22 U/L (ref 0–40)
AMPHETAMINE SCREEN, URINE: NOT DETECTED
ANION GAP SERPL CALCULATED.3IONS-SCNC: 11 MMOL/L (ref 7–16)
ANION GAP SERPL CALCULATED.3IONS-SCNC: 6 MMOL/L (ref 7–16)
AST SERPL-CCNC: 41 U/L (ref 0–39)
B.E.: -1.2 MMOL/L (ref -3–3)
B.E.: 2 MMOL/L (ref -3–3)
B.E.: 2.2 MMOL/L (ref -3–3)
B.E.: 2.7 MMOL/L (ref -3–3)
B.E.: 5.3 MMOL/L (ref -3–3)
BARBITURATE SCREEN URINE: POSITIVE
BASOPHILS ABSOLUTE: 0.02 E9/L (ref 0–0.2)
BASOPHILS ABSOLUTE: 0.04 E9/L (ref 0–0.2)
BASOPHILS RELATIVE PERCENT: 0.2 % (ref 0–2)
BASOPHILS RELATIVE PERCENT: 0.3 % (ref 0–2)
BENZODIAZEPINE SCREEN, URINE: POSITIVE
BILIRUB SERPL-MCNC: 0.8 MG/DL (ref 0–1.2)
BUN BLDV-MCNC: 14 MG/DL (ref 6–20)
BUN BLDV-MCNC: 14 MG/DL (ref 6–20)
CALCIUM SERPL-MCNC: 10.1 MG/DL (ref 8.6–10.2)
CALCIUM SERPL-MCNC: 9.1 MG/DL (ref 8.6–10.2)
CANNABINOID SCREEN URINE: NOT DETECTED
CHLORIDE BLD-SCNC: 96 MMOL/L (ref 98–107)
CHLORIDE BLD-SCNC: 98 MMOL/L (ref 98–107)
CO2: 27 MMOL/L (ref 22–29)
CO2: 32 MMOL/L (ref 22–29)
COCAINE METABOLITE SCREEN URINE: POSITIVE
COHB: 1 % (ref 0–1.5)
COHB: 1.1 % (ref 0–1.5)
COHB: 2.2 % (ref 0–1.5)
COHB: 2.7 % (ref 0–1.5)
COHB: 3.5 % (ref 0–1.5)
CREAT SERPL-MCNC: 1.2 MG/DL (ref 0.7–1.2)
CREAT SERPL-MCNC: 1.3 MG/DL (ref 0.7–1.2)
CRITICAL: ABNORMAL
DATE ANALYZED: ABNORMAL
DATE OF COLLECTION: ABNORMAL
EOSINOPHILS ABSOLUTE: 0.11 E9/L (ref 0.05–0.5)
EOSINOPHILS ABSOLUTE: 0.63 E9/L (ref 0.05–0.5)
EOSINOPHILS RELATIVE PERCENT: 0.9 % (ref 0–6)
EOSINOPHILS RELATIVE PERCENT: 4.9 % (ref 0–6)
ETHANOL: <10 MG/DL (ref 0–0.08)
FENTANYL SCREEN, URINE: POSITIVE
FIO2: 100 %
FIO2: 100 %
FIO2: 90 %
FIO2: 90 %
GFR AFRICAN AMERICAN: >60
GFR AFRICAN AMERICAN: >60
GFR NON-AFRICAN AMERICAN: 58 ML/MIN/1.73
GFR NON-AFRICAN AMERICAN: >60 ML/MIN/1.73
GLUCOSE BLD-MCNC: 118 MG/DL (ref 74–99)
GLUCOSE BLD-MCNC: 94 MG/DL (ref 74–99)
HCO3: 24.9 MMOL/L (ref 22–26)
HCO3: 28.4 MMOL/L (ref 22–26)
HCO3: 30 MMOL/L (ref 22–26)
HCO3: 30.2 MMOL/L (ref 22–26)
HCO3: 32.2 MMOL/L (ref 22–26)
HCT VFR BLD CALC: 43.8 % (ref 37–54)
HCT VFR BLD CALC: 50.4 % (ref 37–54)
HEMOGLOBIN: 13.7 G/DL (ref 12.5–16.5)
HEMOGLOBIN: 16.5 G/DL (ref 12.5–16.5)
HHB: 2.5 % (ref 0–5)
HHB: 4.1 % (ref 0–5)
HHB: 8.1 % (ref 0–5)
HHB: 8.2 % (ref 0–5)
HHB: 9.7 % (ref 0–5)
IMMATURE GRANULOCYTES #: 0.04 E9/L
IMMATURE GRANULOCYTES #: 0.05 E9/L
IMMATURE GRANULOCYTES %: 0.3 % (ref 0–5)
IMMATURE GRANULOCYTES %: 0.4 % (ref 0–5)
LAB: ABNORMAL
LACTIC ACID: 1 MMOL/L (ref 0.5–2.2)
LYMPHOCYTES ABSOLUTE: 1.68 E9/L (ref 1.5–4)
LYMPHOCYTES ABSOLUTE: 3.33 E9/L (ref 1.5–4)
LYMPHOCYTES RELATIVE PERCENT: 14 % (ref 20–42)
LYMPHOCYTES RELATIVE PERCENT: 26 % (ref 20–42)
Lab: ABNORMAL
MCH RBC QN AUTO: 26.6 PG (ref 26–35)
MCH RBC QN AUTO: 27.5 PG (ref 26–35)
MCHC RBC AUTO-ENTMCNC: 31.3 % (ref 32–34.5)
MCHC RBC AUTO-ENTMCNC: 32.7 % (ref 32–34.5)
MCV RBC AUTO: 84 FL (ref 80–99.9)
MCV RBC AUTO: 85 FL (ref 80–99.9)
METER GLUCOSE: 104 MG/DL (ref 74–99)
METER GLUCOSE: 81 MG/DL (ref 74–99)
METHADONE SCREEN, URINE: POSITIVE
METHB: 0.3 % (ref 0–1.5)
METHB: 0.5 % (ref 0–1.5)
METHB: 0.5 % (ref 0–1.5)
MODE: AC
MONOCYTES ABSOLUTE: 0.54 E9/L (ref 0.1–0.95)
MONOCYTES ABSOLUTE: 0.87 E9/L (ref 0.1–0.95)
MONOCYTES RELATIVE PERCENT: 4.5 % (ref 2–12)
MONOCYTES RELATIVE PERCENT: 6.8 % (ref 2–12)
NEUTROPHILS ABSOLUTE: 7.87 E9/L (ref 1.8–7.3)
NEUTROPHILS ABSOLUTE: 9.65 E9/L (ref 1.8–7.3)
NEUTROPHILS RELATIVE PERCENT: 61.6 % (ref 43–80)
NEUTROPHILS RELATIVE PERCENT: 80.1 % (ref 43–80)
O2 CONTENT: 18.5 ML/DL
O2 CONTENT: 18.9 ML/DL
O2 CONTENT: 19.2 ML/DL
O2 CONTENT: 21.2 ML/DL
O2 SATURATION: 90.2 % (ref 92–98.5)
O2 SATURATION: 91.6 % (ref 92–98.5)
O2 SATURATION: 91.8 % (ref 92–98.5)
O2 SATURATION: 95.5 % (ref 92–98.5)
O2 SATURATION: 97.4 % (ref 92–98.5)
O2HB: 88.9 % (ref 94–97)
O2HB: 89.3 % (ref 94–97)
O2HB: 90.6 % (ref 94–97)
O2HB: 91.9 % (ref 94–97)
O2HB: 94.3 % (ref 94–97)
OPERATOR ID: 359
OPERATOR ID: ABNORMAL
OPERATOR ID: ABNORMAL
OPIATE SCREEN URINE: POSITIVE
OXYCODONE URINE: NOT DETECTED
PATIENT TEMP: 37 C
PCO2: 31.8 MMHG (ref 35–45)
PCO2: 36.7 MMHG (ref 35–45)
PCO2: 61 MMHG (ref 35–45)
PCO2: 73.8 MMHG (ref 35–45)
PCO2: 84.3 MMHG (ref 35–45)
PDW BLD-RTO: 15.1 FL (ref 11.5–15)
PDW BLD-RTO: 16.5 FL (ref 11.5–15)
PEEP/CPAP: 10 CMH2O
PEEP/CPAP: 10 CMH2O
PEEP/CPAP: 8 CMH2O
PFO2: 0.59 MMHG/%
PFO2: 0.62 MMHG/%
PFO2: 0.66 MMHG/%
PFO2: 1.24 MMHG/%
PH BLOOD GAS: 7.17 (ref 7.35–7.45)
PH BLOOD GAS: 7.26 (ref 7.35–7.45)
PH BLOOD GAS: 7.31 (ref 7.35–7.45)
PH BLOOD GAS: 7.51 (ref 7.35–7.45)
PH BLOOD GAS: 7.51 (ref 7.35–7.45)
PHENCYCLIDINE SCREEN URINE: NOT DETECTED
PLATELET # BLD: 224 E9/L (ref 130–450)
PLATELET # BLD: 275 E9/L (ref 130–450)
PMV BLD AUTO: 10.1 FL (ref 7–12)
PMV BLD AUTO: 8.7 FL (ref 7–12)
PO2: 123.5 MMHG (ref 75–100)
PO2: 52.9 MMHG (ref 75–100)
PO2: 56.2 MMHG (ref 75–100)
PO2: 65.9 MMHG (ref 75–100)
PO2: 91.7 MMHG (ref 75–100)
POTASSIUM REFLEX MAGNESIUM: 4.6 MMOL/L (ref 3.5–5)
POTASSIUM REFLEX MAGNESIUM: 5.4 MMOL/L (ref 3.5–5)
RBC # BLD: 5.15 E12/L (ref 3.8–5.8)
RBC # BLD: 6 E12/L (ref 3.8–5.8)
REASON FOR REJECTION: NORMAL
REJECTED TEST: NORMAL
RI(T): 10.09
RI(T): 389 %
RI(T): 851 %
RI(T): 9.35
RR MECHANICAL: 20 B/MIN
RR MECHANICAL: 20 B/MIN
RR MECHANICAL: 24 B/MIN
SALICYLATE, SERUM: <0.3 MG/DL (ref 0–30)
SODIUM BLD-SCNC: 134 MMOL/L (ref 132–146)
SODIUM BLD-SCNC: 136 MMOL/L (ref 132–146)
SOURCE, BLOOD GAS: ABNORMAL
THB: 14.8 G/DL (ref 11.5–16.5)
THB: 14.9 G/DL (ref 11.5–16.5)
THB: 15.3 G/DL (ref 11.5–16.5)
THB: 15.9 G/DL (ref 11.5–16.5)
THB: 16.4 G/DL (ref 11.5–16.5)
TIME ANALYZED: 1618
TIME ANALYZED: 1749
TIME ANALYZED: 1909
TIME ANALYZED: 2330
TIME ANALYZED: 2343
TOTAL PROTEIN: 8.7 G/DL (ref 6.4–8.3)
TRICYCLIC ANTIDEPRESSANTS SCREEN SERUM: NEGATIVE NG/ML
TROPONIN: <0.01 NG/ML (ref 0–0.03)
VT MECHANICAL: 500 ML
VT MECHANICAL: 500 ML
VT MECHANICAL: 550 ML
WBC # BLD: 12 E9/L (ref 4.5–11.5)
WBC # BLD: 12.8 E9/L (ref 4.5–11.5)

## 2020-09-08 PROCEDURE — 83605 ASSAY OF LACTIC ACID: CPT

## 2020-09-08 PROCEDURE — 06HY33Z INSERTION OF INFUSION DEVICE INTO LOWER VEIN, PERCUTANEOUS APPROACH: ICD-10-PCS | Performed by: EMERGENCY MEDICINE

## 2020-09-08 PROCEDURE — 99285 EMERGENCY DEPT VISIT HI MDM: CPT

## 2020-09-08 PROCEDURE — 93005 ELECTROCARDIOGRAM TRACING: CPT | Performed by: STUDENT IN AN ORGANIZED HEALTH CARE EDUCATION/TRAINING PROGRAM

## 2020-09-08 PROCEDURE — 6360000002 HC RX W HCPCS: Performed by: STUDENT IN AN ORGANIZED HEALTH CARE EDUCATION/TRAINING PROGRAM

## 2020-09-08 PROCEDURE — 85025 COMPLETE CBC W/AUTO DIFF WBC: CPT

## 2020-09-08 PROCEDURE — 6360000002 HC RX W HCPCS: Performed by: INTERNAL MEDICINE

## 2020-09-08 PROCEDURE — 0BH18EZ INSERTION OF ENDOTRACHEAL AIRWAY INTO TRACHEA, VIA NATURAL OR ARTIFICIAL OPENING ENDOSCOPIC: ICD-10-PCS | Performed by: EMERGENCY MEDICINE

## 2020-09-08 PROCEDURE — 2580000003 HC RX 258: Performed by: INTERNAL MEDICINE

## 2020-09-08 PROCEDURE — 87088 URINE BACTERIA CULTURE: CPT

## 2020-09-08 PROCEDURE — 71045 X-RAY EXAM CHEST 1 VIEW: CPT

## 2020-09-08 PROCEDURE — 96365 THER/PROPH/DIAG IV INF INIT: CPT

## 2020-09-08 PROCEDURE — 96375 TX/PRO/DX INJ NEW DRUG ADDON: CPT

## 2020-09-08 PROCEDURE — 80048 BASIC METABOLIC PNL TOTAL CA: CPT

## 2020-09-08 PROCEDURE — 87040 BLOOD CULTURE FOR BACTERIA: CPT

## 2020-09-08 PROCEDURE — 94770 HC ETCO2 MONITOR DAILY: CPT

## 2020-09-08 PROCEDURE — 2580000003 HC RX 258: Performed by: EMERGENCY MEDICINE

## 2020-09-08 PROCEDURE — 2000000000 HC ICU R&B

## 2020-09-08 PROCEDURE — 6370000000 HC RX 637 (ALT 250 FOR IP): Performed by: STUDENT IN AN ORGANIZED HEALTH CARE EDUCATION/TRAINING PROGRAM

## 2020-09-08 PROCEDURE — 36569 INSJ PICC 5 YR+ W/O IMAGING: CPT

## 2020-09-08 PROCEDURE — 70450 CT HEAD/BRAIN W/O DYE: CPT

## 2020-09-08 PROCEDURE — 2500000003 HC RX 250 WO HCPCS

## 2020-09-08 PROCEDURE — 6360000002 HC RX W HCPCS: Performed by: EMERGENCY MEDICINE

## 2020-09-08 PROCEDURE — 82962 GLUCOSE BLOOD TEST: CPT

## 2020-09-08 PROCEDURE — 5A1945Z RESPIRATORY VENTILATION, 24-96 CONSECUTIVE HOURS: ICD-10-PCS | Performed by: EMERGENCY MEDICINE

## 2020-09-08 PROCEDURE — 81001 URINALYSIS AUTO W/SCOPE: CPT

## 2020-09-08 PROCEDURE — 6360000002 HC RX W HCPCS

## 2020-09-08 PROCEDURE — 82805 BLOOD GASES W/O2 SATURATION: CPT

## 2020-09-08 PROCEDURE — 0100U HC RESPIRPTHGN MULT REV TRANS & AMP PRB TECH 21 TRGT: CPT

## 2020-09-08 PROCEDURE — 51702 INSERT TEMP BLADDER CATH: CPT

## 2020-09-08 PROCEDURE — 87081 CULTURE SCREEN ONLY: CPT

## 2020-09-08 PROCEDURE — 74018 RADEX ABDOMEN 1 VIEW: CPT

## 2020-09-08 PROCEDURE — 84484 ASSAY OF TROPONIN QUANT: CPT

## 2020-09-08 PROCEDURE — G0480 DRUG TEST DEF 1-7 CLASSES: HCPCS

## 2020-09-08 PROCEDURE — 31500 INSERT EMERGENCY AIRWAY: CPT

## 2020-09-08 PROCEDURE — 36415 COLL VENOUS BLD VENIPUNCTURE: CPT

## 2020-09-08 PROCEDURE — 71250 CT THORAX DX C-: CPT

## 2020-09-08 PROCEDURE — 80307 DRUG TEST PRSMV CHEM ANLYZR: CPT

## 2020-09-08 PROCEDURE — 82533 TOTAL CORTISOL: CPT

## 2020-09-08 PROCEDURE — 84145 PROCALCITONIN (PCT): CPT

## 2020-09-08 PROCEDURE — 96376 TX/PRO/DX INJ SAME DRUG ADON: CPT

## 2020-09-08 PROCEDURE — 94002 VENT MGMT INPAT INIT DAY: CPT

## 2020-09-08 PROCEDURE — 2500000003 HC RX 250 WO HCPCS: Performed by: INTERNAL MEDICINE

## 2020-09-08 PROCEDURE — 80053 COMPREHEN METABOLIC PANEL: CPT

## 2020-09-08 RX ORDER — ACETAMINOPHEN 325 MG/1
650 TABLET ORAL EVERY 6 HOURS PRN
Status: DISCONTINUED | OUTPATIENT
Start: 2020-09-08 | End: 2020-09-08

## 2020-09-08 RX ORDER — MIDAZOLAM HYDROCHLORIDE 1 MG/ML
5 INJECTION INTRAMUSCULAR; INTRAVENOUS ONCE
Status: COMPLETED | OUTPATIENT
Start: 2020-09-08 | End: 2020-09-08

## 2020-09-08 RX ORDER — ROCURONIUM BROMIDE 10 MG/ML
INJECTION, SOLUTION INTRAVENOUS
Status: DISCONTINUED
Start: 2020-09-08 | End: 2020-09-08 | Stop reason: WASHOUT

## 2020-09-08 RX ORDER — PROMETHAZINE HYDROCHLORIDE 25 MG/1
12.5 TABLET ORAL EVERY 6 HOURS PRN
Status: DISCONTINUED | OUTPATIENT
Start: 2020-09-08 | End: 2020-09-08

## 2020-09-08 RX ORDER — ACETAMINOPHEN 650 MG/1
650 SUPPOSITORY RECTAL EVERY 6 HOURS PRN
Status: DISCONTINUED | OUTPATIENT
Start: 2020-09-08 | End: 2020-09-13 | Stop reason: HOSPADM

## 2020-09-08 RX ORDER — ROCURONIUM BROMIDE 10 MG/ML
INJECTION, SOLUTION INTRAVENOUS
Status: DISCONTINUED
Start: 2020-09-08 | End: 2020-09-08

## 2020-09-08 RX ORDER — CHLORHEXIDINE GLUCONATE 0.12 MG/ML
15 RINSE ORAL 2 TIMES DAILY
Status: DISCONTINUED | OUTPATIENT
Start: 2020-09-08 | End: 2020-09-11

## 2020-09-08 RX ORDER — SODIUM CHLORIDE 0.9 % (FLUSH) 0.9 %
10 SYRINGE (ML) INJECTION EVERY 12 HOURS SCHEDULED
Status: DISCONTINUED | OUTPATIENT
Start: 2020-09-08 | End: 2020-09-08 | Stop reason: SDUPTHER

## 2020-09-08 RX ORDER — NALOXONE HYDROCHLORIDE 1 MG/ML
INJECTION INTRAMUSCULAR; INTRAVENOUS; SUBCUTANEOUS
Status: COMPLETED
Start: 2020-09-08 | End: 2020-09-08

## 2020-09-08 RX ORDER — POLYETHYLENE GLYCOL 3350 17 G/17G
17 POWDER, FOR SOLUTION ORAL DAILY PRN
Status: DISCONTINUED | OUTPATIENT
Start: 2020-09-08 | End: 2020-09-13 | Stop reason: HOSPADM

## 2020-09-08 RX ORDER — ACETAMINOPHEN 650 MG/1
650 SUPPOSITORY RECTAL EVERY 6 HOURS PRN
Status: DISCONTINUED | OUTPATIENT
Start: 2020-09-08 | End: 2020-09-08

## 2020-09-08 RX ORDER — ACETAMINOPHEN 325 MG/1
650 TABLET ORAL EVERY 6 HOURS PRN
Status: DISCONTINUED | OUTPATIENT
Start: 2020-09-08 | End: 2020-09-13 | Stop reason: HOSPADM

## 2020-09-08 RX ORDER — PROPOFOL 10 MG/ML
10 INJECTION, EMULSION INTRAVENOUS
Status: DISCONTINUED | OUTPATIENT
Start: 2020-09-08 | End: 2020-09-11

## 2020-09-08 RX ORDER — SODIUM CHLORIDE 0.9 % (FLUSH) 0.9 %
10 SYRINGE (ML) INJECTION PRN
Status: DISCONTINUED | OUTPATIENT
Start: 2020-09-08 | End: 2020-09-08

## 2020-09-08 RX ORDER — MIDAZOLAM HYDROCHLORIDE 1 MG/ML
2 INJECTION INTRAMUSCULAR; INTRAVENOUS
Status: DISCONTINUED | OUTPATIENT
Start: 2020-09-08 | End: 2020-09-13 | Stop reason: HOSPADM

## 2020-09-08 RX ORDER — ONDANSETRON 2 MG/ML
4 INJECTION INTRAMUSCULAR; INTRAVENOUS EVERY 6 HOURS PRN
Status: DISCONTINUED | OUTPATIENT
Start: 2020-09-08 | End: 2020-09-08

## 2020-09-08 RX ORDER — POLYETHYLENE GLYCOL 3350 17 G/17G
17 POWDER, FOR SOLUTION ORAL DAILY PRN
Status: DISCONTINUED | OUTPATIENT
Start: 2020-09-08 | End: 2020-09-08

## 2020-09-08 RX ORDER — ROCURONIUM BROMIDE 10 MG/ML
150 INJECTION, SOLUTION INTRAVENOUS ONCE
Status: COMPLETED | OUTPATIENT
Start: 2020-09-08 | End: 2020-09-08

## 2020-09-08 RX ORDER — NALOXONE HYDROCHLORIDE 1 MG/ML
1 INJECTION INTRAMUSCULAR; INTRAVENOUS; SUBCUTANEOUS ONCE
Status: COMPLETED | OUTPATIENT
Start: 2020-09-08 | End: 2020-09-08

## 2020-09-08 RX ORDER — NICOTINE POLACRILEX 4 MG
15 LOZENGE BUCCAL PRN
Status: DISCONTINUED | OUTPATIENT
Start: 2020-09-08 | End: 2020-09-13 | Stop reason: HOSPADM

## 2020-09-08 RX ORDER — ALBUTEROL SULFATE 2.5 MG/3ML
2.5 SOLUTION RESPIRATORY (INHALATION) EVERY 6 HOURS PRN
Status: DISCONTINUED | OUTPATIENT
Start: 2020-09-08 | End: 2020-09-13 | Stop reason: HOSPADM

## 2020-09-08 RX ORDER — DEXTROSE MONOHYDRATE 50 MG/ML
100 INJECTION, SOLUTION INTRAVENOUS PRN
Status: DISCONTINUED | OUTPATIENT
Start: 2020-09-08 | End: 2020-09-13 | Stop reason: HOSPADM

## 2020-09-08 RX ORDER — SODIUM CHLORIDE 0.9 % (FLUSH) 0.9 %
10 SYRINGE (ML) INJECTION PRN
Status: DISCONTINUED | OUTPATIENT
Start: 2020-09-08 | End: 2020-09-08 | Stop reason: SDUPTHER

## 2020-09-08 RX ORDER — DEXTROSE MONOHYDRATE 25 G/50ML
12.5 INJECTION, SOLUTION INTRAVENOUS PRN
Status: DISCONTINUED | OUTPATIENT
Start: 2020-09-08 | End: 2020-09-13 | Stop reason: HOSPADM

## 2020-09-08 RX ORDER — ETOMIDATE 2 MG/ML
30 INJECTION INTRAVENOUS ONCE
Status: COMPLETED | OUTPATIENT
Start: 2020-09-08 | End: 2020-09-08

## 2020-09-08 RX ORDER — FOLIC ACID 5 MG/ML
1 INJECTION, SOLUTION INTRAMUSCULAR; INTRAVENOUS; SUBCUTANEOUS DAILY
Status: DISCONTINUED | OUTPATIENT
Start: 2020-09-09 | End: 2020-09-12

## 2020-09-08 RX ORDER — SODIUM CHLORIDE 0.9 % (FLUSH) 0.9 %
10 SYRINGE (ML) INJECTION EVERY 12 HOURS SCHEDULED
Status: DISCONTINUED | OUTPATIENT
Start: 2020-09-08 | End: 2020-09-08

## 2020-09-08 RX ORDER — ONDANSETRON 2 MG/ML
4 INJECTION INTRAMUSCULAR; INTRAVENOUS EVERY 6 HOURS PRN
Status: DISCONTINUED | OUTPATIENT
Start: 2020-09-08 | End: 2020-09-13 | Stop reason: HOSPADM

## 2020-09-08 RX ORDER — SODIUM CHLORIDE 0.9 % (FLUSH) 0.9 %
10 SYRINGE (ML) INJECTION PRN
Status: DISCONTINUED | OUTPATIENT
Start: 2020-09-08 | End: 2020-09-11

## 2020-09-08 RX ORDER — ETOMIDATE 2 MG/ML
INJECTION INTRAVENOUS
Status: DISCONTINUED
Start: 2020-09-08 | End: 2020-09-08

## 2020-09-08 RX ORDER — THIAMINE HYDROCHLORIDE 100 MG/ML
100 INJECTION, SOLUTION INTRAMUSCULAR; INTRAVENOUS DAILY
Status: DISCONTINUED | OUTPATIENT
Start: 2020-09-09 | End: 2020-09-12

## 2020-09-08 RX ORDER — PROMETHAZINE HYDROCHLORIDE 25 MG/1
12.5 TABLET ORAL EVERY 6 HOURS PRN
Status: DISCONTINUED | OUTPATIENT
Start: 2020-09-08 | End: 2020-09-13 | Stop reason: HOSPADM

## 2020-09-08 RX ORDER — SODIUM CHLORIDE 0.9 % (FLUSH) 0.9 %
10 SYRINGE (ML) INJECTION EVERY 12 HOURS SCHEDULED
Status: DISCONTINUED | OUTPATIENT
Start: 2020-09-08 | End: 2020-09-11

## 2020-09-08 RX ORDER — PROPOFOL 10 MG/ML
INJECTION, EMULSION INTRAVENOUS
Status: DISCONTINUED
Start: 2020-09-08 | End: 2020-09-08

## 2020-09-08 RX ORDER — MIDAZOLAM HYDROCHLORIDE 1 MG/ML
INJECTION INTRAMUSCULAR; INTRAVENOUS
Status: COMPLETED
Start: 2020-09-08 | End: 2020-09-08

## 2020-09-08 RX ADMIN — AMPICILLIN SODIUM AND SULBACTAM SODIUM 3 G: 2; 1 INJECTION, POWDER, FOR SOLUTION INTRAMUSCULAR; INTRAVENOUS at 23:48

## 2020-09-08 RX ADMIN — APIXABAN 5 MG: 5 TABLET, FILM COATED ORAL at 23:48

## 2020-09-08 RX ADMIN — MIDAZOLAM HYDROCHLORIDE 5 MG: 1 INJECTION, SOLUTION INTRAMUSCULAR; INTRAVENOUS at 17:29

## 2020-09-08 RX ADMIN — PROPOFOL 50 MCG/KG/MIN: 10 INJECTION, EMULSION INTRAVENOUS at 20:18

## 2020-09-08 RX ADMIN — NALOXONE HYDROCHLORIDE 2 MG: 1 INJECTION PARENTERAL at 14:53

## 2020-09-08 RX ADMIN — ETOMIDATE 30 MG: 2 INJECTION INTRAVENOUS at 15:37

## 2020-09-08 RX ADMIN — PROPOFOL 50 MCG/KG/MIN: 10 INJECTION, EMULSION INTRAVENOUS at 18:18

## 2020-09-08 RX ADMIN — NALOXONE HYDROCHLORIDE 1 MG: 1 INJECTION INTRAMUSCULAR; INTRAVENOUS; SUBCUTANEOUS at 15:05

## 2020-09-08 RX ADMIN — Medication 25 MCG/HR: at 23:22

## 2020-09-08 RX ADMIN — MIDAZOLAM HYDROCHLORIDE 5 MG: 1 INJECTION, SOLUTION INTRAMUSCULAR; INTRAVENOUS at 19:05

## 2020-09-08 RX ADMIN — SODIUM CHLORIDE 3 G: 9 INJECTION, SOLUTION INTRAVENOUS at 18:11

## 2020-09-08 RX ADMIN — ETOMIDATE 30 MG: 2 INJECTION, SOLUTION INTRAVENOUS at 15:37

## 2020-09-08 RX ADMIN — MIDAZOLAM HYDROCHLORIDE 5 MG: 1 INJECTION, SOLUTION INTRAMUSCULAR; INTRAVENOUS at 20:36

## 2020-09-08 RX ADMIN — ROCURONIUM BROMIDE 150 MG: 10 INJECTION, SOLUTION INTRAVENOUS at 15:38

## 2020-09-08 RX ADMIN — PROPOFOL 10 MCG/KG/MIN: 10 INJECTION, EMULSION INTRAVENOUS at 16:20

## 2020-09-08 RX ADMIN — PROPOFOL INJECTABLE EMULSION 10 MCG/KG/MIN: 10 INJECTION, EMULSION INTRAVENOUS at 16:20

## 2020-09-08 RX ADMIN — ROCURONIUM BROMIDE 150 MG: 50 INJECTION INTRAVENOUS at 15:38

## 2020-09-08 RX ADMIN — MIDAZOLAM HYDROCHLORIDE 5 MG: 1 INJECTION INTRAMUSCULAR; INTRAVENOUS at 19:05

## 2020-09-08 RX ADMIN — NALOXONE HYDROCHLORIDE 1 MG: 1 INJECTION PARENTERAL at 15:05

## 2020-09-08 ASSESSMENT — PULMONARY FUNCTION TESTS
PIF_VALUE: 34
PIF_VALUE: 35
PIF_VALUE: 35
PIF_VALUE: 30
PIF_VALUE: 31
PIF_VALUE: 33

## 2020-09-08 ASSESSMENT — PAIN SCALES - GENERAL
PAINLEVEL_OUTOF10: 0
PAINLEVEL_OUTOF10: 0

## 2020-09-08 NOTE — ED NOTES
2nd attempted unsuccessful, pt being suction and oxygenated with ambu bag     Sixto Ramos RN  09/08/20 [id number]

## 2020-09-08 NOTE — ED PROVIDER NOTES
[address]      Pt Name: Estephania Mckoy  MRN: [id number]  [place] 1965  Date of evaluation: 9/8/2020      CHIEF COMPLAINT       Chief Complaint   Patient presents with    Drug Overdose     went to [facility] to get his methadone and also took Heroin this AM        HPI  Estephania Mckoy is a 48 y.o. male with a history of polysubstance abuse, tobacco abuse, COPD, hyperlipidemia, who presents from the [facility] where he receives his methadone with concerns of possible drug overdose. Apparently he also took heroin today. History limited secondary to patient acuity and respiratory status. Per EMS he also has a known history of benzodiazepine use. EMS administered 1 mg of Narcan prior to arrival with some improvement in his respiratory status. Review of Systems   Unable to perform ROS: Acuity of condition        Physical Exam  Vitals signs and nursing note reviewed. Constitutional:       General: He is not in acute distress. Appearance: He is well-developed. Comments: Somnolent. Obese. HENT:      Head: Normocephalic and atraumatic. Mouth/Throat:      Mouth: Mucous membranes are moist.      Pharynx: No oropharyngeal exudate. Eyes:      General: No scleral icterus. Pupils: Pupils are equal, round, and reactive to light. Neck:      Musculoskeletal: Normal range of motion and neck supple. Cardiovascular:      Rate and Rhythm: Regular rhythm. Tachycardia present. Heart sounds: Normal heart sounds. No murmur. Comments: 2+ radial and dorsal pedis pulses bilaterally  Pulmonary:      Effort: Respiratory distress present. Breath sounds: No wheezing. Comments: Copious secretions in the airway. Moderate respiratory distress. Abdominal:      Palpations: Abdomen is soft. Tenderness: There is no abdominal tenderness. There is no guarding or rebound. Musculoskeletal: Normal range of motion.          General: No tenderness or deformity. Right lower leg: No edema. Left lower leg: No edema. Skin:     General: Skin is warm and dry. Capillary Refill: Capillary refill takes less than 2 seconds. Findings: No rash. Neurological:      General: No focal deficit present. Mental Status: He is alert and oriented to person, place, and time. Cranial Nerves: No cranial nerve deficit. Sensory: No sensory deficit. Motor: No weakness or abnormal muscle tone. Psychiatric:         Mood and Affect: Mood normal.         Behavior: Behavior normal.          Procedures     Central Line Placement Procedure Note    Indication: centrally administered medications and need for frequent blood draws    Consent: Unable to be obtained due to the emergent nature of this procedure. Procedure: The patient was positioned appropriately and the skin over the right femoral vein was prepped with betadine and draped in a sterile fashion. Local anesthesia was not performed due to the emergent nature of this procedure. A large bore needle was used to identify the vein. A guide wire was then inserted into the vein through the needle. A triple lumen catheter was then inserted into the vessel over the guide wire using the Seldinger technique. All ports showed good, free flowing blood return and were flushed with saline solution. The catheter was then securely fastened to the skin with suture. Two sutures were placed into the kit included tube clamp, proximal eyelets and a suture end from each of the securing sutures was extended around the catheter and tied to the proximal eyelets as an added measure to prevent dislodgement. An antibiotic disk was placed and the site was then covered with a sterile dressing. A post procedure X-ray was not indicated. The patient tolerated the procedure well.     Complications: None    Intubation Procedure Note    Indication: impending respiratory failure and airway compromise    Consent: Unable to be obtained due to the emergent nature of this procedure. Medications Used: etomidate intravenously and rocuronium intravenously    Procedure: The patient was placed in the appropriate position. Cricoid pressure was utilized. Intubation was performed by direct laryngoscopy using a laryngoscope and an 8.0 cuffed endotracheal tube. The cuff was then inflated and the tube was secured appropriately at a distance of  27cm to the dental ridge. Initial confirmation of placement included bilateral breath sounds, an end tidal CO2 detector, absence of sounds over the stomach, tube fogging, adequate chest rise and adequate pulse oximetry reading. A chest x-ray to verify correct placement of the tube showed appropriate tube position. The patient tolerated the procedure well. Complications: multiple attempts            MDM   This is a 53-year-old male with history of polysubstance abuse who presents by EMS with acute respiratory distress, altered mental status after apparently taking heroin outside of the methadone clinic. In the emergency department he is initially somnolent, hemodynamically stable. He is in moderate respiratory distress. Administered 2 mg of Narcan. The patient was observed and his respiratory status continued to decline. He had copious secretions and was having trouble protecting his airway. The patient was therefore then intubated. His respiratory status improved after intubation. His tox screen was positive for opiates, methadone, benzodiazepines, fentanyl, cocaine. Oxygenation improved after intubation. Initially after intubation PCO2 worsened. We changed from our ER multiple ventilator to better ventilator device. Hypercapnia began to improve. About panel initially hemolyzed. Repeat showed potassium 4.6. Creatinine 1.2. Patient had leukocytosis. Chest x-ray questionable for aspiration pneumonia. Patient started on Unasyn.   Blood cultures medications have been reviewed.     Allergies: Ativan [lorazepam] and Sulfa antibiotics    -------------------------------------------------- RESULTS -------------------------------------------------    LABS:  Results for orders placed or performed during the hospital encounter of 09/08/20   CBC Auto Differential   Result Value Ref Range    WBC 12.8 (H) 4.5 - 11.5 E9/L    RBC 6.00 (H) 3.80 - 5.80 E12/L    Hemoglobin 16.5 12.5 - 16.5 g/dL    Hematocrit 50.4 37.0 - 54.0 %    MCV 84.0 80.0 - 99.9 fL    MCH 27.5 26.0 - 35.0 pg    MCHC 32.7 32.0 - 34.5 %    RDW 16.5 (H) 11.5 - 15.0 fL    Platelets 858 713 - 940 E9/L    MPV 10.1 7.0 - 12.0 fL    Neutrophils % 61.6 43.0 - 80.0 %    Immature Granulocytes % 0.4 0.0 - 5.0 %    Lymphocytes % 26.0 20.0 - 42.0 %    Monocytes % 6.8 2.0 - 12.0 %    Eosinophils % 4.9 0.0 - 6.0 %    Basophils % 0.3 0.0 - 2.0 %    Neutrophils Absolute 7.87 (H) 1.80 - 7.30 E9/L    Immature Granulocytes # 0.05 E9/L    Lymphocytes Absolute 3.33 1.50 - 4.00 E9/L    Monocytes Absolute 0.87 0.10 - 0.95 E9/L    Eosinophils Absolute 0.63 (H) 0.05 - 0.50 E9/L    Basophils Absolute 0.04 0.00 - 0.20 E9/L   Comprehensive Metabolic Panel w/ Reflex to MG   Result Value Ref Range    Sodium 134 132 - 146 mmol/L    Potassium reflex Magnesium 5.4 (H) 3.5 - 5.0 mmol/L    Chloride 96 (L) 98 - 107 mmol/L    CO2 27 22 - 29 mmol/L    Anion Gap 11 7 - 16 mmol/L    Glucose 94 74 - 99 mg/dL    BUN 14 6 - 20 mg/dL    CREATININE 1.2 0.7 - 1.2 mg/dL    GFR Non-African American >60 >=60 mL/min/1.73    GFR African American >60     Calcium 10.1 8.6 - 10.2 mg/dL    Total Protein 8.7 (H) 6.4 - 8.3 g/dL    Alb 3.9 3.5 - 5.2 g/dL    Total Bilirubin 0.8 0.0 - 1.2 mg/dL    Alkaline Phosphatase 72 40 - 129 U/L    ALT 22 0 - 40 U/L    AST 41 (H) 0 - 39 U/L   Troponin   Result Value Ref Range    Troponin <0.01 0.00 - 0.03 ng/mL   URINE DRUG SCREEN   Result Value Ref Range    Amphetamine Screen, Urine NOT DETECTED Negative <1000 ng/mL Barbiturate Screen, Ur POSITIVE (A) Negative < 200 ng/mL    Benzodiazepine Screen, Urine POSITIVE (A) Negative < 200 ng/mL    Cannabinoid Scrn, Ur NOT DETECTED Negative < 50ng/mL    Cocaine Metabolite Screen, Urine POSITIVE (A) Negative < 300 ng/mL    Opiate Scrn, Ur POSITIVE (A) Negative < 300ng/mL    PCP Screen, Urine NOT DETECTED Negative < 25 ng/mL    Methadone Screen, Urine POSITIVE (A) Negative <300 ng/mL    Oxycodone Urine NOT DETECTED Negative <100 ng/mL    FENTANYL SCREEN, URINE POSITIVE (A) Negative <1 ng/mL    Drug Screen Comment: see below    SPECIMEN REJECTION   Result Value Ref Range    Rejected Test SDS2     Reason for Rejection see below    Serum Drug Screen   Result Value Ref Range    Ethanol Lvl <10 mg/dL    Acetaminophen Level <5.0 (L) 10.0 - 70.5 mcg/mL    Salicylate, Serum <1.8 0.0 - 30.0 mg/dL    TCA Scrn NEGATIVE Cutoff:300 ng/mL   Blood Gas, Arterial   Result Value Ref Range    Date Analyzed 20200908     Time Analyzed 1618     Source: Blood Arterial     pH, Blood Gas 7.257 (L) 7.350 - 7.450    PCO2 73.8 (HH) 35.0 - 45.0 mmHg    PO2 91.7 75.0 - 100.0 mmHg    HCO3 32.2 (H) 22.0 - 26.0 mmol/L    B.E. 2.2 -3.0 - 3.0 mmol/L    O2 Sat 95.5 92.0 - 98.5 %    O2Hb 91.9 (L) 94.0 - 97.0 %    COHb 3.5 (H) 0.0 - 1.5 %    MetHb 0.5 0.0 - 1.5 %    HHb 4.1 0.0 - 5.0 %    tHb (est) 16.4 11.5 - 16.5 g/dL    Mode AC     Rr Mechanical 20.0 b/min    Vt Mechanical 500.0 mL    Peep/Cpap 8.0 cmH2O    Date Of Collection      Time Collected      Pt Temp 37.0 C     ID [id number]     Lab [id number]     Critical(s) Notified Handed report to Dr/RN    Basic Metabolic Panel w/ Reflex to MG   Result Value Ref Range    Sodium 136 132 - 146 mmol/L    Potassium reflex Magnesium 4.6 3.5 - 5.0 mmol/L    Chloride 98 98 - 107 mmol/L    CO2 32 (H) 22 - 29 mmol/L    Anion Gap 6 (L) 7 - 16 mmol/L    Glucose 118 (H) 74 - 99 mg/dL    BUN 14 6 - 20 mg/dL    CREATININE 1.3 (H) 0.7 - 1.2 mg/dL    GFR Non-African American 58 >=60 mL/min/1.73 GFR African American >60     Calcium 9.1 8.6 - 10.2 mg/dL   Blood Gas, Arterial   Result Value Ref Range    Date Analyzed 20200908     Time Analyzed 1749     Source: Blood Arterial     pH, Blood Gas 7.172 (LL) 7.350 - 7.450    PCO2 84.3 (HH) 35.0 - 45.0 mmHg    PO2 123.5 (H) 75.0 - 100.0 mmHg    HCO3 30.2 (H) 22.0 - 26.0 mmol/L    B.E. -1.2 -3.0 - 3.0 mmol/L    O2 Sat 97.4 92.0 - 98.5 %    PO2/FIO2 1.24 mmHg/%    AaDO2 480.2 mmHg    RI(T) 389 %    O2Hb 94.3 94.0 - 97.0 %    COHb 2.7 (H) 0.0 - 1.5 %    MetHb 0.5 0.0 - 1.5 %    O2 Content 21.2 mL/dL    HHb 2.5 0.0 - 5.0 %    tHb (est) 15.9 11.5 - 16.5 g/dL    Mode AC     FIO2 100.0 %    Rr Mechanical 20.0 b/min    Vt Mechanical 500.0 mL    Peep/Cpap 8.0 cmH2O    Date Of Collection      Time Collected      Pt Temp 37.0 C     ID [id number]     Lab [id number]     Critical(s) Notified Handed report to Dr/RN    Blood Gas, Arterial   Result Value Ref Range    Date Analyzed 20200908     Time Analyzed 1909     Source: Blood Arterial     pH, Blood Gas 7.310 (L) 7.350 - 7.450    PCO2 61.0 (H) 35.0 - 45.0 mmHg    PO2 65.9 (L) 75.0 - 100.0 mmHg    HCO3 30.0 (H) 22.0 - 26.0 mmol/L    B.E. 2.0 -3.0 - 3.0 mmol/L    O2 Sat 91.6 (L) 92.0 - 98.5 %    PO2/FIO2 0.66 mmHg/%    AaDO2 561.1 mmHg    RI(T) 851 %    O2Hb 89.3 (L) 94.0 - 97.0 %    COHb 2.2 (H) 0.0 - 1.5 %    MetHb 0.3 0.0 - 1.5 %    O2 Content 19.2 mL/dL    HHb 8.2 (H) 0.0 - 5.0 %    tHb (est) 15.3 11.5 - 16.5 g/dL    Mode AC     FIO2 100.0 %    Rr Mechanical 24.0 b/min    Vt Mechanical 550.0 mL    Peep/Cpap 8.0 cmH2O    Date Of Collection      Time Collected      Pt Temp 37.0 C     ID [id number]     Lab [id number]     Critical(s) Notified .  No Critical Values    Procalcitonin   Result Value Ref Range    Procalcitonin 0.10 (H) 0.00 - 0.08 ng/mL   Cortisol Total   Result Value Ref Range    Cortisol 9.60 2.68 - 18.40 mcg/dL   Urinalysis with Microscopic   Result Value Ref Range    Color, UA Yellow Straw/Yellow    Clarity, UA Clear Clear    Glucose, Ur Negative Negative mg/dL    Bilirubin Urine SMALL (A) Negative    Ketones, Urine Negative Negative mg/dL    Specific Gravity, UA 1.015 1.005 - 1.030    Blood, Urine TRACE-INTACT Negative    pH, UA 7.5 5.0 - 9.0    Protein, UA 30 (A) Negative mg/dL    Urobilinogen, Urine 4.0 (A) <2.0 E.U./dL    Nitrite, Urine Negative Negative    Leukocyte Esterase, Urine Negative Negative    WBC, UA 0-1 0 - 5 /HPF    RBC, UA 2-5 0 - 2 /HPF    Epithelial Cells, UA RARE /HPF    Bacteria, UA RARE (A) None Seen /HPF   Lactic acid, plasma   Result Value Ref Range    Lactic Acid 1.0 0.5 - 2.2 mmol/L   Comprehensive Metabolic Panel w/ Reflex to MG   Result Value Ref Range    Sodium 135 132 - 146 mmol/L    Potassium reflex Magnesium 3.7 3.5 - 5.0 mmol/L    Chloride 98 98 - 107 mmol/L    CO2 28 22 - 29 mmol/L    Anion Gap 9 7 - 16 mmol/L    Glucose 98 74 - 99 mg/dL    BUN 14 6 - 20 mg/dL    CREATININE 1.2 0.7 - 1.2 mg/dL    GFR Non-African American >60 >=60 mL/min/1.73    GFR African American >60     Calcium 8.5 (L) 8.6 - 10.2 mg/dL    Total Protein 6.6 6.4 - 8.3 g/dL    Alb 3.1 (L) 3.5 - 5.2 g/dL    Total Bilirubin 0.6 0.0 - 1.2 mg/dL    Alkaline Phosphatase 56 40 - 129 U/L    ALT 15 0 - 40 U/L    AST 20 0 - 39 U/L   Lactic acid, plasma   Result Value Ref Range    Lactic Acid 1.0 0.5 - 2.2 mmol/L   CBC auto differential   Result Value Ref Range    WBC 12.0 (H) 4.5 - 11.5 E9/L    RBC 5.15 3.80 - 5.80 E12/L    Hemoglobin 13.7 12.5 - 16.5 g/dL    Hematocrit 43.8 37.0 - 54.0 %    MCV 85.0 80.0 - 99.9 fL    MCH 26.6 26.0 - 35.0 pg    MCHC 31.3 (L) 32.0 - 34.5 %    RDW 15.1 (H) 11.5 - 15.0 fL    Platelets 862 364 - 892 E9/L    MPV 8.7 7.0 - 12.0 fL    Neutrophils % 80.1 (H) 43.0 - 80.0 %    Immature Granulocytes % 0.3 0.0 - 5.0 %    Lymphocytes % 14.0 (L) 20.0 - 42.0 %    Monocytes % 4.5 2.0 - 12.0 %    Eosinophils % 0.9 0.0 - 6.0 %    Basophils % 0.2 0.0 - 2.0 %    Neutrophils Absolute 9.65 (H) 1.80 - 7.30 E9/L    Immature 104 (H) 74 - 99 mg/dL   EKG 12 Lead   Result Value Ref Range    Ventricular Rate 95 BPM    Atrial Rate 95 BPM    P-R Interval 182 ms    QRS Duration 92 ms    Q-T Interval 350 ms    QTc Calculation (Bazett) 439 ms    P Axis 56 degrees    R Axis 110 degrees    T Axis 34 degrees       RADIOLOGY:  CT CHEST WO CONTRAST   Final Result      Bibasilar consolidations likely infectious/inflammatory in etiology. Recommend dedicated chest CT in 3 months to demonstrate resolution. CT Head WO Contrast   Final Result   Unremarkable brain. Suspected nasal polyps. XR ABDOMEN FOR NG/OG/NE TUBE PLACEMENT   Final Result   Status post endotracheal and nasogastric intubation as noted. Increasing airspace disease on the right. XR CHEST PORTABLE   Final Result   Status post endotracheal and nasogastric intubation as noted. Increasing airspace disease on the right. XR CHEST PORTABLE   Final Result   Suboptimal inspiration which in this case may mimic congestive   failure. Consider a two-view study. EKG:  This EKG is signed and interpreted by me. Rate: 95bpm  Rhythm: Sinus  Interpretation: Right axis deviation. Artifact. No ST segment elevation or depression. Comparison: Compared to the previous from July 26, 2019 artifact new otherwise no other significant changes. ------------------------- NURSING NOTES AND VITALS REVIEWED ---------------------------  Date / Time Roomed:  9/8/2020  2:45 PM  ED Bed Assignment:  [id number]    The nursing notes within the ED encounter and vital signs as below have been reviewed.      Patient Vitals for the past 24 hrs:   BP Temp Temp src Pulse Resp SpO2 Height Weight   09/09/20 0100 111/70 -- -- 65 24 93 % -- --   09/09/20 0000 108/68 98.4 °F (36.9 °C) Bladder 66 24 94 % -- --   09/08/20 2356 -- 98.4 °F (36.9 °C) Bladder 65 24 93 % -- --   09/08/20 2337 -- -- -- 65 24 92 % -- --   09/08/20 2300 95/66 -- -- 64 24 92 % -- --   09/08/20 the patient. This patient's ED course included: a personal history and physicial examination, re-evaluation prior to disposition, multiple bedside re-evaluations, IV medications, cardiac monitoring, continuous pulse oximetry and complex medical decision making and emergency management    Please note that the withdrawal or failure to initiate urgent interventions for this patient would likely result in a life threatening deterioration or permanent disability. Systems at risk for deterioration include: Hypertension, airway compromise,    Accordingly this patient received 50 minutes of critical care time, excluding separately billable procedures. This patient has remained hemodynamically stable during their ED course. Diagnosis:  1. Acute respiratory distress    2. Drug overdose, undetermined intent, initial encounter        Disposition:  Patient's disposition: Admit to CCU/ICU  Patient's condition is stable.          Nori Harper DO  Resident  09/09/20 [id number]

## 2020-09-08 NOTE — ED NOTES
Dr. Hugo Argueta and Dr. Wally Lopez at bedside preparing to intubate patient     Jose Van RN  09/08/20 [id number]

## 2020-09-08 NOTE — ED NOTES
B/L equal breath sounds, 8.0 tube 27 at the [facility]     Amy Escobedo, ISACC  09/08/20 0945

## 2020-09-09 ENCOUNTER — APPOINTMENT (OUTPATIENT)
Dept: GENERAL RADIOLOGY | Age: 55
DRG: 816 | End: 2020-09-09
Payer: MEDICAID

## 2020-09-09 LAB
AADO2: 565.3 MMHG
AADO2: 568.4 MMHG
AADO2: 596.8 MMHG
AADO2: 602.5 MMHG
ADENOVIRUS BY PCR: NOT DETECTED
ALBUMIN SERPL-MCNC: 3.1 G/DL (ref 3.5–5.2)
ALBUMIN SERPL-MCNC: 3.2 G/DL (ref 3.5–5.2)
ALP BLD-CCNC: 56 U/L (ref 40–129)
ALP BLD-CCNC: 58 U/L (ref 40–129)
ALT SERPL-CCNC: 15 U/L (ref 0–40)
ALT SERPL-CCNC: 15 U/L (ref 0–40)
ANION GAP SERPL CALCULATED.3IONS-SCNC: 10 MMOL/L (ref 7–16)
ANION GAP SERPL CALCULATED.3IONS-SCNC: 9 MMOL/L (ref 7–16)
AST SERPL-CCNC: 20 U/L (ref 0–39)
AST SERPL-CCNC: 20 U/L (ref 0–39)
B.E.: 3.8 MMOL/L (ref -3–3)
B.E.: 4.5 MMOL/L (ref -3–3)
B.E.: 5.5 MMOL/L (ref -3–3)
B.E.: 5.9 MMOL/L (ref -3–3)
BACTERIA: ABNORMAL /HPF
BASOPHILS ABSOLUTE: 0.01 E9/L (ref 0–0.2)
BASOPHILS RELATIVE PERCENT: 0.1 % (ref 0–2)
BILIRUB SERPL-MCNC: 0.6 MG/DL (ref 0–1.2)
BILIRUB SERPL-MCNC: 0.7 MG/DL (ref 0–1.2)
BILIRUBIN URINE: ABNORMAL
BLOOD, URINE: ABNORMAL
BORDETELLA PARAPERTUSSIS BY PCR: NOT DETECTED
BORDETELLA PERTUSSIS BY PCR: NOT DETECTED
BUN BLDV-MCNC: 14 MG/DL (ref 6–20)
BUN BLDV-MCNC: 14 MG/DL (ref 6–20)
CALCIUM SERPL-MCNC: 8.5 MG/DL (ref 8.6–10.2)
CALCIUM SERPL-MCNC: 8.7 MG/DL (ref 8.6–10.2)
CHLAMYDOPHILIA PNEUMONIAE BY PCR: NOT DETECTED
CHLORIDE BLD-SCNC: 101 MMOL/L (ref 98–107)
CHLORIDE BLD-SCNC: 98 MMOL/L (ref 98–107)
CLARITY: CLEAR
CO2: 28 MMOL/L (ref 22–29)
CO2: 29 MMOL/L (ref 22–29)
COHB: 0.7 % (ref 0–1.5)
COHB: 0.7 % (ref 0–1.5)
COHB: 0.9 % (ref 0–1.5)
COHB: 1 % (ref 0–1.5)
COLOR: YELLOW
CORONAVIRUS 229E BY PCR: NOT DETECTED
CORONAVIRUS HKU1 BY PCR: NOT DETECTED
CORONAVIRUS NL63 BY PCR: NOT DETECTED
CORONAVIRUS OC43 BY PCR: NOT DETECTED
CORTISOL TOTAL: 9.6 MCG/DL (ref 2.68–18.4)
CREAT SERPL-MCNC: 1.2 MG/DL (ref 0.7–1.2)
CREAT SERPL-MCNC: 1.2 MG/DL (ref 0.7–1.2)
CRITICAL: ABNORMAL
DATE ANALYZED: ABNORMAL
DATE OF COLLECTION: ABNORMAL
EOSINOPHILS ABSOLUTE: 0.06 E9/L (ref 0.05–0.5)
EOSINOPHILS RELATIVE PERCENT: 0.6 % (ref 0–6)
EPITHELIAL CELLS, UA: ABNORMAL /HPF
FIO2: 100 %
GFR AFRICAN AMERICAN: >60
GFR AFRICAN AMERICAN: >60
GFR NON-AFRICAN AMERICAN: >60 ML/MIN/1.73
GFR NON-AFRICAN AMERICAN: >60 ML/MIN/1.73
GLUCOSE BLD-MCNC: 122 MG/DL (ref 74–99)
GLUCOSE BLD-MCNC: 98 MG/DL (ref 74–99)
GLUCOSE URINE: NEGATIVE MG/DL
HCO3: 28 MMOL/L (ref 22–26)
HCO3: 28.5 MMOL/L (ref 22–26)
HCO3: 29.9 MMOL/L (ref 22–26)
HCO3: 30.3 MMOL/L (ref 22–26)
HCT VFR BLD CALC: 44.7 % (ref 37–54)
HEMOGLOBIN: 14.2 G/DL (ref 12.5–16.5)
HHB: 10.3 % (ref 0–5)
HHB: 5.6 % (ref 0–5)
HHB: 6.6 % (ref 0–5)
HHB: 8.2 % (ref 0–5)
HUMAN METAPNEUMOVIRUS BY PCR: NOT DETECTED
HUMAN RHINOVIRUS/ENTEROVIRUS BY PCR: NOT DETECTED
IMMATURE GRANULOCYTES #: 0.06 E9/L
IMMATURE GRANULOCYTES %: 0.6 % (ref 0–5)
INFLUENZA A BY PCR: NOT DETECTED
INFLUENZA B BY PCR: NOT DETECTED
KETONES, URINE: NEGATIVE MG/DL
LAB: ABNORMAL
LACTIC ACID: 1 MMOL/L (ref 0.5–2.2)
LEUKOCYTE ESTERASE, URINE: NEGATIVE
LYMPHOCYTES ABSOLUTE: 0.72 E9/L (ref 1.5–4)
LYMPHOCYTES RELATIVE PERCENT: 7.1 % (ref 20–42)
Lab: ABNORMAL
MCH RBC QN AUTO: 26.9 PG (ref 26–35)
MCHC RBC AUTO-ENTMCNC: 31.8 % (ref 32–34.5)
MCV RBC AUTO: 84.8 FL (ref 80–99.9)
METER GLUCOSE: 117 MG/DL (ref 74–99)
METER GLUCOSE: 119 MG/DL (ref 74–99)
METER GLUCOSE: 139 MG/DL (ref 74–99)
METHB: 0.3 % (ref 0–1.5)
METHB: 0.4 % (ref 0–1.5)
MODE: AC
MONOCYTES ABSOLUTE: 0.22 E9/L (ref 0.1–0.95)
MONOCYTES RELATIVE PERCENT: 2.2 % (ref 2–12)
MYCOPLASMA PNEUMONIAE BY PCR: NOT DETECTED
NEUTROPHILS ABSOLUTE: 9.1 E9/L (ref 1.8–7.3)
NEUTROPHILS RELATIVE PERCENT: 89.4 % (ref 43–80)
NITRITE, URINE: NEGATIVE
O2 CONTENT: 18.6 ML/DL
O2 CONTENT: 18.9 ML/DL
O2 CONTENT: 19.7 ML/DL
O2 CONTENT: 19.9 ML/DL
O2 SATURATION: 89.6 % (ref 92–98.5)
O2 SATURATION: 91.7 % (ref 92–98.5)
O2 SATURATION: 93.3 % (ref 92–98.5)
O2 SATURATION: 94.3 % (ref 92–98.5)
O2HB: 88.5 % (ref 94–97)
O2HB: 90.5 % (ref 94–97)
O2HB: 92.4 % (ref 94–97)
O2HB: 93.3 % (ref 94–97)
OPERATOR ID: ABNORMAL
PARAINFLUENZA VIRUS 1 BY PCR: NOT DETECTED
PARAINFLUENZA VIRUS 2 BY PCR: NOT DETECTED
PARAINFLUENZA VIRUS 3 BY PCR: NOT DETECTED
PARAINFLUENZA VIRUS 4 BY PCR: NOT DETECTED
PATIENT TEMP: 37 C
PCO2: 34.4 MMHG (ref 35–45)
PCO2: 34.8 MMHG (ref 35–45)
PCO2: 47.1 MMHG (ref 35–45)
PCO2: 52.7 MMHG (ref 35–45)
PDW BLD-RTO: 15.2 FL (ref 11.5–15)
PEEP/CPAP: 12 CMH2O
PEEP/CPAP: 12 CMH2O
PEEP/CPAP: 14 CMH2O
PEEP/CPAP: 14 CMH2O
PFO2: 0.51 MMHG/%
PFO2: 0.56 MMHG/%
PFO2: 0.7 MMHG/%
PFO2: 0.73 MMHG/%
PH BLOOD GAS: 7.38 (ref 7.35–7.45)
PH BLOOD GAS: 7.42 (ref 7.35–7.45)
PH BLOOD GAS: 7.53 (ref 7.35–7.45)
PH BLOOD GAS: 7.53 (ref 7.35–7.45)
PH UA: 7.5 (ref 5–9)
PLATELET # BLD: 226 E9/L (ref 130–450)
PMV BLD AUTO: 8.9 FL (ref 7–12)
PO2: 51.1 MMHG (ref 75–100)
PO2: 56.4 MMHG (ref 75–100)
PO2: 70 MMHG (ref 75–100)
PO2: 72.5 MMHG (ref 75–100)
POTASSIUM REFLEX MAGNESIUM: 3.7 MMOL/L (ref 3.5–5)
POTASSIUM SERPL-SCNC: 3.9 MMOL/L (ref 3.5–5)
PROCALCITONIN: 0.1 NG/ML (ref 0–0.08)
PROTEIN UA: 30 MG/DL
RBC # BLD: 5.27 E12/L (ref 3.8–5.8)
RBC UA: ABNORMAL /HPF (ref 0–2)
RESPIRATORY SYNCYTIAL VIRUS BY PCR: NOT DETECTED
RI(T): 10.58
RI(T): 11.79
RI(T): 7.84
RI(T): 8.08
RR MECHANICAL: 24 B/MIN
SODIUM BLD-SCNC: 135 MMOL/L (ref 132–146)
SODIUM BLD-SCNC: 140 MMOL/L (ref 132–146)
SOURCE, BLOOD GAS: ABNORMAL
SPECIFIC GRAVITY UA: 1.01 (ref 1–1.03)
THB: 14.9 G/DL (ref 11.5–16.5)
THB: 15 G/DL (ref 11.5–16.5)
THB: 15.2 G/DL (ref 11.5–16.5)
THB: 15.2 G/DL (ref 11.5–16.5)
TIME ANALYZED: 231
TIME ANALYZED: 252
TIME ANALYZED: 511
TIME ANALYZED: 757
TOTAL PROTEIN: 6.6 G/DL (ref 6.4–8.3)
TOTAL PROTEIN: 6.6 G/DL (ref 6.4–8.3)
UROBILINOGEN, URINE: 4 E.U./DL
VT MECHANICAL: 450 ML
VT MECHANICAL: 450 ML
VT MECHANICAL: 550 ML
VT MECHANICAL: 550 ML
WBC # BLD: 10.2 E9/L (ref 4.5–11.5)
WBC UA: ABNORMAL /HPF (ref 0–5)

## 2020-09-09 PROCEDURE — 87206 SMEAR FLUORESCENT/ACID STAI: CPT

## 2020-09-09 PROCEDURE — 82805 BLOOD GASES W/O2 SATURATION: CPT

## 2020-09-09 PROCEDURE — 80053 COMPREHEN METABOLIC PANEL: CPT

## 2020-09-09 PROCEDURE — 6360000002 HC RX W HCPCS: Performed by: INTERNAL MEDICINE

## 2020-09-09 PROCEDURE — 2500000003 HC RX 250 WO HCPCS: Performed by: EMERGENCY MEDICINE

## 2020-09-09 PROCEDURE — 2000000000 HC ICU R&B

## 2020-09-09 PROCEDURE — 2580000003 HC RX 258: Performed by: INTERNAL MEDICINE

## 2020-09-09 PROCEDURE — 2580000003 HC RX 258: Performed by: STUDENT IN AN ORGANIZED HEALTH CARE EDUCATION/TRAINING PROGRAM

## 2020-09-09 PROCEDURE — 82962 GLUCOSE BLOOD TEST: CPT

## 2020-09-09 PROCEDURE — 71045 X-RAY EXAM CHEST 1 VIEW: CPT

## 2020-09-09 PROCEDURE — 2580000003 HC RX 258: Performed by: EMERGENCY MEDICINE

## 2020-09-09 PROCEDURE — 6360000002 HC RX W HCPCS: Performed by: EMERGENCY MEDICINE

## 2020-09-09 PROCEDURE — 36415 COLL VENOUS BLD VENIPUNCTURE: CPT

## 2020-09-09 PROCEDURE — 85025 COMPLETE CBC W/AUTO DIFF WBC: CPT

## 2020-09-09 PROCEDURE — 94640 AIRWAY INHALATION TREATMENT: CPT

## 2020-09-09 PROCEDURE — 6360000002 HC RX W HCPCS: Performed by: STUDENT IN AN ORGANIZED HEALTH CARE EDUCATION/TRAINING PROGRAM

## 2020-09-09 PROCEDURE — 87040 BLOOD CULTURE FOR BACTERIA: CPT

## 2020-09-09 PROCEDURE — 2500000003 HC RX 250 WO HCPCS: Performed by: INTERNAL MEDICINE

## 2020-09-09 PROCEDURE — 87070 CULTURE OTHR SPECIMN AEROBIC: CPT

## 2020-09-09 PROCEDURE — 6370000000 HC RX 637 (ALT 250 FOR IP): Performed by: STUDENT IN AN ORGANIZED HEALTH CARE EDUCATION/TRAINING PROGRAM

## 2020-09-09 PROCEDURE — 6370000000 HC RX 637 (ALT 250 FOR IP): Performed by: EMERGENCY MEDICINE

## 2020-09-09 PROCEDURE — 94003 VENT MGMT INPAT SUBQ DAY: CPT

## 2020-09-09 RX ORDER — METHYLPREDNISOLONE SODIUM SUCCINATE 125 MG/2ML
80 INJECTION, POWDER, LYOPHILIZED, FOR SOLUTION INTRAMUSCULAR; INTRAVENOUS DAILY
Status: DISCONTINUED | OUTPATIENT
Start: 2020-09-09 | End: 2020-09-10

## 2020-09-09 RX ORDER — ACETAZOLAMIDE 500 MG/5ML
250 INJECTION, POWDER, LYOPHILIZED, FOR SOLUTION INTRAVENOUS EVERY 12 HOURS
Status: COMPLETED | OUTPATIENT
Start: 2020-09-09 | End: 2020-09-10

## 2020-09-09 RX ORDER — IPRATROPIUM BROMIDE AND ALBUTEROL SULFATE 2.5; .5 MG/3ML; MG/3ML
1 SOLUTION RESPIRATORY (INHALATION)
Status: DISCONTINUED | OUTPATIENT
Start: 2020-09-09 | End: 2020-09-13 | Stop reason: HOSPADM

## 2020-09-09 RX ORDER — DEXTROSE AND SODIUM CHLORIDE 5; .45 G/100ML; G/100ML
INJECTION, SOLUTION INTRAVENOUS CONTINUOUS
Status: DISCONTINUED | OUTPATIENT
Start: 2020-09-09 | End: 2020-09-12

## 2020-09-09 RX ADMIN — APIXABAN 5 MG: 5 TABLET, FILM COATED ORAL at 07:58

## 2020-09-09 RX ADMIN — IPRATROPIUM BROMIDE AND ALBUTEROL SULFATE 1 AMPULE: .5; 3 SOLUTION RESPIRATORY (INHALATION) at 08:18

## 2020-09-09 RX ADMIN — PROPOFOL 40 MCG/KG/MIN: 10 INJECTION, EMULSION INTRAVENOUS at 00:40

## 2020-09-09 RX ADMIN — PROPOFOL 50 MCG/KG/MIN: 10 INJECTION, EMULSION INTRAVENOUS at 09:23

## 2020-09-09 RX ADMIN — APIXABAN 5 MG: 5 TABLET, FILM COATED ORAL at 20:08

## 2020-09-09 RX ADMIN — METHYLPREDNISOLONE SODIUM SUCCINATE 62.5 MG: 125 INJECTION, POWDER, FOR SOLUTION INTRAMUSCULAR; INTRAVENOUS at 02:05

## 2020-09-09 RX ADMIN — PROPOFOL 50 MCG/KG/MIN: 10 INJECTION, EMULSION INTRAVENOUS at 07:07

## 2020-09-09 RX ADMIN — FAMOTIDINE 20 MG: 10 INJECTION INTRAVENOUS at 07:58

## 2020-09-09 RX ADMIN — ALBUTEROL SULFATE 2.5 MG: 2.5 SOLUTION RESPIRATORY (INHALATION) at 03:02

## 2020-09-09 RX ADMIN — PROPOFOL 50 MCG/KG/MIN: 10 INJECTION, EMULSION INTRAVENOUS at 15:32

## 2020-09-09 RX ADMIN — AMPICILLIN SODIUM AND SULBACTAM SODIUM 3 G: 2; 1 INJECTION, POWDER, FOR SOLUTION INTRAMUSCULAR; INTRAVENOUS at 05:34

## 2020-09-09 RX ADMIN — CHLORHEXIDINE GLUCONATE 15 ML: 1.2 RINSE ORAL at 10:01

## 2020-09-09 RX ADMIN — Medication 125 MCG/HR: at 21:29

## 2020-09-09 RX ADMIN — FOLIC ACID 1 MG: 5 INJECTION, SOLUTION INTRAMUSCULAR; INTRAVENOUS; SUBCUTANEOUS at 07:58

## 2020-09-09 RX ADMIN — PROPOFOL 50 MCG/KG/MIN: 10 INJECTION, EMULSION INTRAVENOUS at 03:25

## 2020-09-09 RX ADMIN — PROPOFOL 50 MCG/KG/MIN: 10 INJECTION, EMULSION INTRAVENOUS at 11:25

## 2020-09-09 RX ADMIN — SODIUM CHLORIDE, PRESERVATIVE FREE 10 ML: 5 INJECTION INTRAVENOUS at 20:10

## 2020-09-09 RX ADMIN — AZITHROMYCIN DIHYDRATE 500 MG: 500 INJECTION, POWDER, LYOPHILIZED, FOR SOLUTION INTRAVENOUS at 02:43

## 2020-09-09 RX ADMIN — PROPOFOL 50 MCG/KG/MIN: 10 INJECTION, EMULSION INTRAVENOUS at 21:46

## 2020-09-09 RX ADMIN — IPRATROPIUM BROMIDE AND ALBUTEROL SULFATE 1 AMPULE: .5; 3 SOLUTION RESPIRATORY (INHALATION) at 12:33

## 2020-09-09 RX ADMIN — AMPICILLIN SODIUM AND SULBACTAM SODIUM 3 G: 2; 1 INJECTION, POWDER, FOR SOLUTION INTRAMUSCULAR; INTRAVENOUS at 18:04

## 2020-09-09 RX ADMIN — ACETAZOLAMIDE 250 MG: 500 INJECTION, POWDER, LYOPHILIZED, FOR SOLUTION INTRAVENOUS at 12:43

## 2020-09-09 RX ADMIN — DEXTROSE AND SODIUM CHLORIDE: 5; 450 INJECTION, SOLUTION INTRAVENOUS at 10:01

## 2020-09-09 RX ADMIN — AMPICILLIN SODIUM AND SULBACTAM SODIUM 3 G: 2; 1 INJECTION, POWDER, FOR SOLUTION INTRAMUSCULAR; INTRAVENOUS at 11:32

## 2020-09-09 RX ADMIN — PROPOFOL 50 MCG/KG/MIN: 10 INJECTION, EMULSION INTRAVENOUS at 23:56

## 2020-09-09 RX ADMIN — AMPICILLIN SODIUM AND SULBACTAM SODIUM 3 G: 2; 1 INJECTION, POWDER, FOR SOLUTION INTRAMUSCULAR; INTRAVENOUS at 23:58

## 2020-09-09 RX ADMIN — PROPOFOL 50 MCG/KG/MIN: 10 INJECTION, EMULSION INTRAVENOUS at 05:16

## 2020-09-09 RX ADMIN — IPRATROPIUM BROMIDE AND ALBUTEROL SULFATE 1 AMPULE: .5; 3 SOLUTION RESPIRATORY (INHALATION) at 16:21

## 2020-09-09 RX ADMIN — PROPOFOL 50 MCG/KG/MIN: 10 INJECTION, EMULSION INTRAVENOUS at 17:43

## 2020-09-09 RX ADMIN — PROPOFOL 50 MCG/KG/MIN: 10 INJECTION, EMULSION INTRAVENOUS at 19:39

## 2020-09-09 RX ADMIN — PROPOFOL 50 MCG/KG/MIN: 10 INJECTION, EMULSION INTRAVENOUS at 13:29

## 2020-09-09 RX ADMIN — FAMOTIDINE 20 MG: 10 INJECTION INTRAVENOUS at 20:10

## 2020-09-09 RX ADMIN — IPRATROPIUM BROMIDE AND ALBUTEROL SULFATE 1 AMPULE: .5; 3 SOLUTION RESPIRATORY (INHALATION) at 20:44

## 2020-09-09 RX ADMIN — DEXTROSE AND SODIUM CHLORIDE: 5; 450 INJECTION, SOLUTION INTRAVENOUS at 19:50

## 2020-09-09 RX ADMIN — CHLORHEXIDINE GLUCONATE 15 ML: 1.2 RINSE ORAL at 20:09

## 2020-09-09 RX ADMIN — THIAMINE HYDROCHLORIDE 100 MG: 100 INJECTION, SOLUTION INTRAMUSCULAR; INTRAVENOUS at 07:58

## 2020-09-09 ASSESSMENT — PAIN SCALES - GENERAL
PAINLEVEL_OUTOF10: 0

## 2020-09-09 ASSESSMENT — PULMONARY FUNCTION TESTS
PIF_VALUE: 40
PIF_VALUE: 39
PIF_VALUE: 38
PIF_VALUE: 33
PIF_VALUE: 47
PIF_VALUE: 33
PIF_VALUE: 34
PIF_VALUE: 34
PIF_VALUE: 39
PIF_VALUE: 31
PIF_VALUE: 40
PIF_VALUE: 40
PIF_VALUE: 30
PIF_VALUE: 32
PIF_VALUE: 42
PIF_VALUE: 33
PIF_VALUE: 30
PIF_VALUE: 30
PIF_VALUE: 31
PIF_VALUE: 31
PIF_VALUE: 34
PIF_VALUE: 34
PIF_VALUE: 33
PIF_VALUE: 33
PIF_VALUE: 31

## 2020-09-09 NOTE — FLOWSHEET NOTE
Patient continues to reach for lines and tubes despite verbal redirection. Restraints remain in place for patient safety.     Jaida Sanchez RN  9/9/2020  4:21 PM

## 2020-09-09 NOTE — ED NOTES
Report called to [facility]. Bed not ready. Receiving Rn to call back.      Aditya Guerrero RN  09/08/20 9894
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
## 2020-09-09 NOTE — PLAN OF CARE
Problem: Restraint Use - Nonviolent/Non-Self-Destructive Behavior:  Goal: Absence of restraint indications  Description: Absence of restraint indications  9/9/2020 1623 by Amadeo Davalos RN  Outcome: Not Met This Shift     Problem: Restraint Use - Nonviolent/Non-Self-Destructive Behavior:  Goal: Absence of restraint-related injury  Description: Absence of restraint-related injury  9/9/2020 1623 by Amadeo Davalos RN  Outcome: Met This Shift

## 2020-09-09 NOTE — FLOWSHEET NOTE
Patient continues to reach for lines and tubes despite verbal redirection. Restraints remain in place for patient safety.     Ngoc Orantes RN  9/9/2020  9:18 AM

## 2020-09-09 NOTE — CONSULTS
[facility]  Department of Internal Medicine   MICU Consult Note    Patient:  Nadeem Palmer 48 y.o. male   MRN: [id number]       Date of Service: 9/9/2020      Chief Complaint: Altered mental status    History of Present Illness      Patient is a 65-year-old male with a past medical history of polysubstance abuse, spinal stenosis, obstructive sleep apnea, [place], hyperlipidemia, right leg DVT, depression, COPD, unprovoked pulmonary embolism (on Eliquis), and tobacco abuse who presented to the emergency department and was discharged on hospital for altered mental status coming from [facility]. The patient presented to [facility] today to get his methadone dose, he was found to be altered, and was found that he has taken heroin on the same day a decision was then made to transfer him to the emergency department. ED Course: In the ED the patient's vital signs were remarkable for hypertension (143/96), tachypnea of 26, and was initially placed on 4 L nasal cannula. Patient with then intubated because of his worsening mental status and having hypercapnia, and for airway protection. Patient's initial labs were remarkable for a creatinine of 1.3, AST 41, urine drug screen which was positive for barbiturates, benzo diazepam's, methadone, opiates, cocaine, and fentanyl. Patient CBC was remarkable for leukocytosis 12.8, with an increase in the absolute neutrophil count, patient's blood gases were remarkable for acidemia and a carbonate side of 65.9. Patient's EKG was remarkable for PACs and a right axis deviation. .  Chest x-ray remarkable for right-sided infiltrates, CAT scan was remarkable for bilateral consolidative changes, CAT scan of his head was unremarkable. The patient had an apnea/hypopnea  index of 57.5 and REM index was 64.      Past Medical History:      Diagnosis Date    Anxiety     sees Dr. Ailyn Connell    Aortitis [facility]) 08/01/11    Blurred vision     Chronic back pain     COPD (chronic results found for: PHART, YWB1LTQ, PO2ART, B7ACQPQW, OEC3IAW, BEART  TSH:    Lab Results   Component Value Date    TSH 0.735 07/27/2019         Resident's Assessment & Plan     Assessment:     1. Acute encephalopathy secondary to drug overdose and hypercapnic respiratory failure. UDS +ve. 2.  Acute hypercapnic and hypoxic respiratory failure: Secondary to respiratory depression from drug overdose, and aspiration pneumonia/pneumonitis. Poor response to Oxygen is suggestive of a shunt. 3.  Obstructive sleep apnea. 4.  COPD. 5.  Hyperlipidemia  6. Hypertension. 7.  History of unprovoked pulmonary embolism: On Eliquis 5 mg twice daily. 8.Tobacoo abuse   9. Polysubstance abuse    Plan:    · Mechanically ventilated on AC/VC mode, Daily ABGs and CXR. · Titrate tidal volume according to pH  · Increase PEEP for target PO2 more than 55-60. · Start Unasyn and azithromycin  · Start methylprednisolone 80 mg daily. · Follow Blood Cx, respiratory Cx, RVP, and procalcitonin. · Attempt to start Enteral nutrition in the coming 24 to 48 hours. · Hold Antihypertensive medications  · Hold Depression medications till further evaluation. · Concern for suicidal attempt per the sister due to uncontrolled depression. · Continue Breathing treatment. DVT/GI prophylaxis: Kamini Saeed M.D. , PGY-2  Internal Medicine    Attending Physician: Dr. Bryant Noble personally saw, examined and provided care for the patient. Radiographs, labs and medication list were reviewed by me independently. I spoke with bedside nursing, therapists and consultants. Critical care services and times documented are independent of procedures and multidisciplinary rounds with Residents. Additionally comprehensive, multidisciplinary rounds were conducted with the MICU team. The case was discussed in detail and plans for care were established.  Review of Residents documentation was conducted and revisions were made as appropriate. I agree with the above documented exam, problem list and plan of care. Full vent support   Abx for aspiration pneumonia   Bronchodilators   Follow cxr     Mason Gloria M.D.    Pulmonary/Critical Care Medicine   37 min cct excluding procedures

## 2020-09-09 NOTE — PROGRESS NOTES
Hospitalist Progress Note      PCP: Caron Quinn MD    Date of Admission: 9/8/2020       Subjective: Pt was seen and examined in ICU with sister at bedside. No acute event overnight; unable to participate in review of systems due to intubated, sedated state. Medications:  Reviewed    Infusion Medications    dextrose 5 % and 0.45 % NaCl 100 mL/hr at 09/09/20 1001    propofol 50 mcg/kg/min (09/09/20 1532)    fentaNYL 5 mcg/ml in 0.9%  ml infusion 75 mcg/hr (09/09/20 1622)    dextrose       Scheduled Medications    ipratropium-albuterol  1 ampule Inhalation Q4H WA    methylPREDNISolone  80 mg Intravenous Daily    azithromycin  500 mg Intravenous Q24H    acetaZOLAMIDE  250 mg Intravenous Q12H    sodium chloride flush  10 mL Intravenous 2 times per day    chlorhexidine  15 mL Mouth/Throat BID    famotidine (PEPCID) injection  20 mg Intravenous BID    folic acid  1 mg Intravenous Daily    thiamine  100 mg Intravenous Daily    ampicillin-sulbactam  3 g Intravenous Q6H    apixaban  5 mg Oral BID    insulin lispro  0-6 Units Subcutaneous TID WC    insulin lispro  0-3 Units Subcutaneous Nightly     PRN Meds: polyethylene glycol, sodium chloride flush, midazolam, albuterol, acetaminophen **OR** acetaminophen, promethazine **OR** ondansetron, glucose, dextrose, glucagon (rDNA), dextrose      Intake/Output Summary (Last 24 hours) at 9/9/2020 1705  Last data filed at 9/9/2020 1700  Gross per 24 hour   Intake 3306.1 ml   Output 3876 ml   Net -569.9 ml       Exam:    /62   Pulse 69   Temp 98.2 °F (36.8 °C) (Bladder)   Resp 30   Ht 6' 5\" (1.956 m)   Wt (!) 374 lb 9 oz (169.9 kg)   SpO2 93%   BMI 44.42 kg/m²     General appearance: Intubated, on full vent support. No apparent distress. Respiratory: Good air entry bilaterally. Cardiovascular: Normal S1/S2. Regular rhythm and rate. Abdomen: Soft, non-tender, non-distended with normal bowel sounds.   Musculoskeletal: No clubbing, cyanosis or edema bilaterally. Skin: Skin color, texture, turgor normal.  No rashes or lesions. Neurologic:  Sedated  Peripheral Pulses: +2 palpable, equal bilaterally       Labs:   Recent Labs     09/08/20  1523 09/08/20  2335 09/09/20  0409   WBC 12.8* 12.0* 10.2   HGB 16.5 13.7 14.2   HCT 50.4 43.8 44.7    224 226     Recent Labs     09/08/20  1812 09/08/20  2335 09/09/20  0409    135 140   K 4.6 3.7 3.9   CL 98 98 101   CO2 32* 28 29   BUN 14 14 14   CREATININE 1.3* 1.2 1.2   CALCIUM 9.1 8.5* 8.7     Recent Labs     09/08/20  1523 09/08/20  2335 09/09/20  0409   AST 41* 20 20   ALT 22 15 15   BILITOT 0.8 0.6 0.7   ALKPHOS 72 56 58     No results for input(s): INR in the last 72 hours. Recent Labs     09/08/20  1523   TROPONINI <0.01       Radiology:  XR CHEST PORTABLE   Final Result   Stable abnormal chest with unchanged bibasilar infiltrates/atelectasis   and pleural effusions which may be due to pneumonia or edema. XR CHEST PORTABLE   Final Result   Partial clearing of right-sided infiltrates               CT CHEST WO CONTRAST   Final Result      Bibasilar consolidations likely infectious/inflammatory in etiology. Recommend dedicated chest CT in 3 months to demonstrate resolution. CT Head WO Contrast   Final Result   Unremarkable brain. Suspected nasal polyps. XR ABDOMEN FOR NG/OG/NE TUBE PLACEMENT   Final Result   Status post endotracheal and nasogastric intubation as noted. Increasing airspace disease on the right. XR CHEST PORTABLE   Final Result   Status post endotracheal and nasogastric intubation as noted. Increasing airspace disease on the right. XR CHEST PORTABLE   Final Result   Suboptimal inspiration which in this case may mimic congestive   failure. Consider a two-view study.             XR CHEST PORTABLE    (Results Pending)   XR CHEST PORTABLE    (Results Pending)             Active Hospital Problems    Diagnosis Date Noted    Acute respiratory distress [R06.03] 09/08/2020       Assessment  Acute respiratory failure with hypoxia, hypercapnia- to requiring vent support  Probable aspiration pneumonia  Drug overdose  Acute toxic encephalopathy - secondary to above  COPD  History of PE  Chronic anticoagulation - on Eliquis for above   NATALI/Probable OHVS  Morbid obesity -  Body mass index is 44.42 kg/m².    GERD  Hypertension  Hyperlipidemia  Polysubstance abuse  Tobacco abuse    Plan:  Continue with critical care, vent management per intensivist  Wean FiO2 to keep oxygen saturation above 92%  On Unasyn, azithromycin  IV Solu-Medrol, bronchodilators as needed  Continue with Eliquis    Diet: Diet NPO Effective Now  Code Status: Full Code    PT/OT Eval Status: N/A    Dispo - remains in ICU for still requiring critical care    Chaz Hernandez MD 9/9/2020 5:05 PM

## 2020-09-09 NOTE — PATIENT CARE CONFERENCE
P Quality Flow/Interdisciplinary Rounds Progress Note        Quality Flow Rounds held on September 9, 2020    Disciplines Attending:  , pt/ot, resp therapy, bedside nurse, charge nurse, nursing management    Nikolas Campos was admitted on 9/8/2020  2:45 PM    Anticipated Discharge Date:  Expected Discharge Date: 09/24/20    Disposition:    Jeff Score:  Jeff Scale Score: 12    Readmission Risk              Risk of Unplanned Readmission:        16           Discussed patient goal for the day, patient clinical progression, and barriers to discharge. The following Goal(s) of the Day/Commitment(s) have been identified:  Wean vent as tolerated.       Margarita Monroe  September 9, 2020

## 2020-09-09 NOTE — PLAN OF CARE
Problem: Restraint Use - Nonviolent/Non-Self-Destructive Behavior:  Goal: Absence of restraint indications  Description: Absence of restraint indications  9/9/2020 0932 by Yasmine Belcher RN  Outcome: Not Met This Shift     Problem: Restraint Use - Nonviolent/Non-Self-Destructive Behavior:  Goal: Absence of restraint-related injury  Description: Absence of restraint-related injury  9/9/2020 0932 by Yasmine Belcher RN  Outcome: Met This Shift

## 2020-09-09 NOTE — PROGRESS NOTES
LAURA Villareal, agreeable to arterial line insertion. Verified with 2nd RN, Rashawn Maldonado.        Caryl Jean Baptiste RN  9/9/2020  3:25 PM

## 2020-09-09 NOTE — PLAN OF CARE
Problem: Skin Integrity:  Goal: Will show no infection signs and symptoms  Description: Will show no infection signs and symptoms  Outcome: Met This Shift     Problem: Skin Integrity:  Goal: Absence of new skin breakdown  Description: Absence of new skin breakdown  Outcome: Met This Shift     Problem: Falls - Risk of:  Goal: Will remain free from falls  Description: Will remain free from falls  Outcome: Met This Shift     Problem: Falls - Risk of:  Goal: Absence of physical injury  Description: Absence of physical injury  Outcome: Met This Shift     Problem: Restraint Use - Nonviolent/Non-Self-Destructive Behavior:  Goal: Absence of restraint-related injury  Description: Absence of restraint-related injury  Outcome: Met This Shift     Problem: Restraint Use - Nonviolent/Non-Self-Destructive Behavior:  Goal: Absence of restraint indications  Description: Absence of restraint indications  Outcome: Not Met This Shift

## 2020-09-10 ENCOUNTER — APPOINTMENT (OUTPATIENT)
Dept: GENERAL RADIOLOGY | Age: 55
DRG: 816 | End: 2020-09-10
Payer: MEDICAID

## 2020-09-10 LAB
AADO2: 506 MMHG
AADO2: 554.2 MMHG
AADO2: 578.3 MMHG
ALBUMIN SERPL-MCNC: 3 G/DL (ref 3.5–5.2)
ALP BLD-CCNC: 48 U/L (ref 40–129)
ALT SERPL-CCNC: 13 U/L (ref 0–40)
ANION GAP SERPL CALCULATED.3IONS-SCNC: 10 MMOL/L (ref 7–16)
AST SERPL-CCNC: 13 U/L (ref 0–39)
B.E.: -1 MMOL/L (ref -3–3)
B.E.: -1.1 MMOL/L (ref -3–3)
B.E.: -2.6 MMOL/L (ref -3–3)
BASOPHILS ABSOLUTE: 0.01 E9/L (ref 0–0.2)
BASOPHILS RELATIVE PERCENT: 0.1 % (ref 0–2)
BILIRUB SERPL-MCNC: 0.4 MG/DL (ref 0–1.2)
BUN BLDV-MCNC: 11 MG/DL (ref 6–20)
CALCIUM SERPL-MCNC: 8.5 MG/DL (ref 8.6–10.2)
CHLORIDE BLD-SCNC: 104 MMOL/L (ref 98–107)
CO2: 25 MMOL/L (ref 22–29)
COHB: 0.3 % (ref 0–1.5)
COHB: 0.7 % (ref 0–1.5)
COHB: 0.7 % (ref 0–1.5)
CREAT SERPL-MCNC: 1.2 MG/DL (ref 0.7–1.2)
CRITICAL: ABNORMAL
DATE ANALYZED: ABNORMAL
DATE OF COLLECTION: ABNORMAL
EKG ATRIAL RATE: 95 BPM
EKG P AXIS: 56 DEGREES
EKG P-R INTERVAL: 182 MS
EKG Q-T INTERVAL: 350 MS
EKG QRS DURATION: 92 MS
EKG QTC CALCULATION (BAZETT): 439 MS
EKG R AXIS: 110 DEGREES
EKG T AXIS: 34 DEGREES
EKG VENTRICULAR RATE: 95 BPM
EOSINOPHILS ABSOLUTE: 0.02 E9/L (ref 0.05–0.5)
EOSINOPHILS RELATIVE PERCENT: 0.2 % (ref 0–6)
FIO2: 100 %
FIO2: 100 %
FIO2: 90 %
GFR AFRICAN AMERICAN: >60
GFR NON-AFRICAN AMERICAN: >60 ML/MIN/1.73
GLUCOSE BLD-MCNC: 139 MG/DL (ref 74–99)
HCO3: 22.8 MMOL/L (ref 22–26)
HCO3: 23.7 MMOL/L (ref 22–26)
HCO3: 25.4 MMOL/L (ref 22–26)
HCT VFR BLD CALC: 42.1 % (ref 37–54)
HEMOGLOBIN: 13.4 G/DL (ref 12.5–16.5)
HHB: 4.1 % (ref 0–5)
HHB: 6.4 % (ref 0–5)
HHB: 6.6 % (ref 0–5)
IMMATURE GRANULOCYTES #: 0.07 E9/L
IMMATURE GRANULOCYTES %: 0.7 % (ref 0–5)
LAB: ABNORMAL
LYMPHOCYTES ABSOLUTE: 1.81 E9/L (ref 1.5–4)
LYMPHOCYTES RELATIVE PERCENT: 17.6 % (ref 20–42)
Lab: ABNORMAL
MCH RBC QN AUTO: 27.1 PG (ref 26–35)
MCHC RBC AUTO-ENTMCNC: 31.8 % (ref 32–34.5)
MCV RBC AUTO: 85.2 FL (ref 80–99.9)
METER GLUCOSE: 118 MG/DL (ref 74–99)
METER GLUCOSE: 145 MG/DL (ref 74–99)
METER GLUCOSE: 146 MG/DL (ref 74–99)
METER GLUCOSE: 88 MG/DL (ref 74–99)
METHB: 0.3 % (ref 0–1.5)
METHB: 0.5 % (ref 0–1.5)
METHB: 0.5 % (ref 0–1.5)
MODE: ABNORMAL
MODE: AC
MODE: AC
MONOCYTES ABSOLUTE: 0.52 E9/L (ref 0.1–0.95)
MONOCYTES RELATIVE PERCENT: 5.1 % (ref 2–12)
MRSA CULTURE ONLY: NORMAL
NEUTROPHILS ABSOLUTE: 7.85 E9/L (ref 1.8–7.3)
NEUTROPHILS RELATIVE PERCENT: 76.3 % (ref 43–80)
O2 CONTENT: 18.4 ML/DL
O2 CONTENT: 18.8 ML/DL
O2 CONTENT: 19.1 ML/DL
O2 SATURATION: 93.3 % (ref 92–98.5)
O2 SATURATION: 93.5 % (ref 92–98.5)
O2 SATURATION: 95.9 % (ref 92–98.5)
O2HB: 92.6 % (ref 94–97)
O2HB: 92.6 % (ref 94–97)
O2HB: 94.7 % (ref 94–97)
OPERATOR ID: 881
OPERATOR ID: 882
OPERATOR ID: ABNORMAL
PATIENT TEMP: 37 C
PCO2: 40.2 MMHG (ref 35–45)
PCO2: 41.7 MMHG (ref 35–45)
PCO2: 49 MMHG (ref 35–45)
PDW BLD-RTO: 15.7 FL (ref 11.5–15)
PEEP/CPAP: 14 CMH2O
PFO2: 0.69 MMHG/%
PFO2: 0.78 MMHG/%
PFO2: 0.85 MMHG/%
PH BLOOD GAS: 7.33 (ref 7.35–7.45)
PH BLOOD GAS: 7.36 (ref 7.35–7.45)
PH BLOOD GAS: 7.39 (ref 7.35–7.45)
PLATELET # BLD: 215 E9/L (ref 130–450)
PMV BLD AUTO: 8.9 FL (ref 7–12)
PO2: 69.5 MMHG (ref 75–100)
PO2: 70.5 MMHG (ref 75–100)
PO2: 84.8 MMHG (ref 75–100)
POTASSIUM SERPL-SCNC: 3.4 MMOL/L (ref 3.5–5)
PS: 8 CMH20
RBC # BLD: 4.94 E12/L (ref 3.8–5.8)
RI(T): 6.54
RI(T): 7.18
RI(T): 8.32
RR MECHANICAL: 30 B/MIN
RR MECHANICAL: 30 B/MIN
SODIUM BLD-SCNC: 139 MMOL/L (ref 132–146)
SOURCE, BLOOD GAS: ABNORMAL
THB: 14.1 G/DL (ref 11.5–16.5)
THB: 14.3 G/DL (ref 11.5–16.5)
THB: 14.4 G/DL (ref 11.5–16.5)
TIME ANALYZED: 1404
TIME ANALYZED: 508
TIME ANALYZED: 812
TOTAL PROTEIN: 6.3 G/DL (ref 6.4–8.3)
VT MECHANICAL: 450 ML
VT MECHANICAL: 450 ML
WBC # BLD: 10.3 E9/L (ref 4.5–11.5)

## 2020-09-10 PROCEDURE — 82805 BLOOD GASES W/O2 SATURATION: CPT

## 2020-09-10 PROCEDURE — 6360000002 HC RX W HCPCS: Performed by: INTERNAL MEDICINE

## 2020-09-10 PROCEDURE — 6370000000 HC RX 637 (ALT 250 FOR IP): Performed by: STUDENT IN AN ORGANIZED HEALTH CARE EDUCATION/TRAINING PROGRAM

## 2020-09-10 PROCEDURE — 2580000003 HC RX 258: Performed by: EMERGENCY MEDICINE

## 2020-09-10 PROCEDURE — 94003 VENT MGMT INPAT SUBQ DAY: CPT

## 2020-09-10 PROCEDURE — 6360000002 HC RX W HCPCS: Performed by: EMERGENCY MEDICINE

## 2020-09-10 PROCEDURE — 2000000000 HC ICU R&B

## 2020-09-10 PROCEDURE — 71045 X-RAY EXAM CHEST 1 VIEW: CPT

## 2020-09-10 PROCEDURE — 94640 AIRWAY INHALATION TREATMENT: CPT

## 2020-09-10 PROCEDURE — 37799 UNLISTED PX VASCULAR SURGERY: CPT

## 2020-09-10 PROCEDURE — 93010 ELECTROCARDIOGRAM REPORT: CPT | Performed by: INTERNAL MEDICINE

## 2020-09-10 PROCEDURE — 80053 COMPREHEN METABOLIC PANEL: CPT

## 2020-09-10 PROCEDURE — 2500000003 HC RX 250 WO HCPCS: Performed by: INTERNAL MEDICINE

## 2020-09-10 PROCEDURE — 2500000003 HC RX 250 WO HCPCS: Performed by: EMERGENCY MEDICINE

## 2020-09-10 PROCEDURE — 2580000003 HC RX 258: Performed by: INTERNAL MEDICINE

## 2020-09-10 PROCEDURE — 36415 COLL VENOUS BLD VENIPUNCTURE: CPT

## 2020-09-10 PROCEDURE — 51702 INSERT TEMP BLADDER CATH: CPT

## 2020-09-10 PROCEDURE — 85025 COMPLETE CBC W/AUTO DIFF WBC: CPT

## 2020-09-10 PROCEDURE — 82962 GLUCOSE BLOOD TEST: CPT

## 2020-09-10 PROCEDURE — 6370000000 HC RX 637 (ALT 250 FOR IP): Performed by: EMERGENCY MEDICINE

## 2020-09-10 PROCEDURE — 6370000000 HC RX 637 (ALT 250 FOR IP): Performed by: INTERNAL MEDICINE

## 2020-09-10 PROCEDURE — 36620 INSERTION CATHETER ARTERY: CPT

## 2020-09-10 PROCEDURE — 6360000002 HC RX W HCPCS: Performed by: STUDENT IN AN ORGANIZED HEALTH CARE EDUCATION/TRAINING PROGRAM

## 2020-09-10 PROCEDURE — 2580000003 HC RX 258: Performed by: STUDENT IN AN ORGANIZED HEALTH CARE EDUCATION/TRAINING PROGRAM

## 2020-09-10 RX ORDER — POTASSIUM CHLORIDE 29.8 MG/ML
INJECTION INTRAVENOUS
Status: DISPENSED
Start: 2020-09-10 | End: 2020-09-10

## 2020-09-10 RX ORDER — QUETIAPINE FUMARATE 25 MG/1
50 TABLET, FILM COATED ORAL NIGHTLY PRN
Status: DISCONTINUED | OUTPATIENT
Start: 2020-09-10 | End: 2020-09-10

## 2020-09-10 RX ORDER — MIDAZOLAM HYDROCHLORIDE 1 MG/ML
2 INJECTION INTRAMUSCULAR; INTRAVENOUS ONCE
Status: COMPLETED | OUTPATIENT
Start: 2020-09-10 | End: 2020-09-10

## 2020-09-10 RX ORDER — HYDROXYZINE PAMOATE 50 MG/1
50 CAPSULE ORAL EVERY 8 HOURS PRN
Status: DISCONTINUED | OUTPATIENT
Start: 2020-09-10 | End: 2020-09-13 | Stop reason: HOSPADM

## 2020-09-10 RX ORDER — POTASSIUM CHLORIDE 29.8 MG/ML
40 INJECTION INTRAVENOUS ONCE
Status: COMPLETED | OUTPATIENT
Start: 2020-09-10 | End: 2020-09-10

## 2020-09-10 RX ORDER — BUPRENORPHINE 2 MG/1
2 TABLET SUBLINGUAL PRN
Status: DISPENSED | OUTPATIENT
Start: 2020-09-10 | End: 2020-09-13

## 2020-09-10 RX ORDER — QUETIAPINE FUMARATE 25 MG/1
50 TABLET, FILM COATED ORAL 2 TIMES DAILY
Status: DISCONTINUED | OUTPATIENT
Start: 2020-09-10 | End: 2020-09-13 | Stop reason: HOSPADM

## 2020-09-10 RX ORDER — CLONIDINE HYDROCHLORIDE 0.1 MG/1
0.1 TABLET ORAL PRN
Status: DISCONTINUED | OUTPATIENT
Start: 2020-09-10 | End: 2020-09-10

## 2020-09-10 RX ORDER — CLONIDINE HYDROCHLORIDE 0.1 MG/1
0.1 TABLET ORAL EVERY 6 HOURS PRN
Status: DISCONTINUED | OUTPATIENT
Start: 2020-09-10 | End: 2020-09-13 | Stop reason: HOSPADM

## 2020-09-10 RX ADMIN — HYDROXYZINE PAMOATE 50 MG: 50 CAPSULE ORAL at 13:16

## 2020-09-10 RX ADMIN — MIDAZOLAM 2 MG: 1 INJECTION INTRAMUSCULAR; INTRAVENOUS at 17:30

## 2020-09-10 RX ADMIN — PROPOFOL 50 MCG/KG/MIN: 10 INJECTION, EMULSION INTRAVENOUS at 09:39

## 2020-09-10 RX ADMIN — DEXTROSE AND SODIUM CHLORIDE: 5; 450 INJECTION, SOLUTION INTRAVENOUS at 18:43

## 2020-09-10 RX ADMIN — PROPOFOL 50 MCG/KG/MIN: 10 INJECTION, EMULSION INTRAVENOUS at 21:19

## 2020-09-10 RX ADMIN — ACETAZOLAMIDE 250 MG: 500 INJECTION, POWDER, LYOPHILIZED, FOR SOLUTION INTRAVENOUS at 01:07

## 2020-09-10 RX ADMIN — THIAMINE HYDROCHLORIDE 100 MG: 100 INJECTION, SOLUTION INTRAMUSCULAR; INTRAVENOUS at 08:17

## 2020-09-10 RX ADMIN — Medication 200 MCG/HR: at 11:44

## 2020-09-10 RX ADMIN — IPRATROPIUM BROMIDE AND ALBUTEROL SULFATE 1 AMPULE: .5; 3 SOLUTION RESPIRATORY (INHALATION) at 11:27

## 2020-09-10 RX ADMIN — FAMOTIDINE 20 MG: 10 INJECTION INTRAVENOUS at 20:01

## 2020-09-10 RX ADMIN — CHLORHEXIDINE GLUCONATE 15 ML: 1.2 RINSE ORAL at 08:16

## 2020-09-10 RX ADMIN — AMPICILLIN SODIUM AND SULBACTAM SODIUM 3 G: 2; 1 INJECTION, POWDER, FOR SOLUTION INTRAMUSCULAR; INTRAVENOUS at 17:30

## 2020-09-10 RX ADMIN — AMPICILLIN SODIUM AND SULBACTAM SODIUM 3 G: 2; 1 INJECTION, POWDER, FOR SOLUTION INTRAMUSCULAR; INTRAVENOUS at 11:11

## 2020-09-10 RX ADMIN — CHLORHEXIDINE GLUCONATE 15 ML: 1.2 RINSE ORAL at 20:06

## 2020-09-10 RX ADMIN — APIXABAN 5 MG: 5 TABLET, FILM COATED ORAL at 20:06

## 2020-09-10 RX ADMIN — PROPOFOL 50 MCG/KG/MIN: 10 INJECTION, EMULSION INTRAVENOUS at 19:31

## 2020-09-10 RX ADMIN — POTASSIUM CHLORIDE 40 MEQ: 400 INJECTION, SOLUTION INTRAVENOUS at 05:07

## 2020-09-10 RX ADMIN — Medication 2 MG: at 20:01

## 2020-09-10 RX ADMIN — AZITHROMYCIN DIHYDRATE 500 MG: 500 INJECTION, POWDER, LYOPHILIZED, FOR SOLUTION INTRAVENOUS at 02:30

## 2020-09-10 RX ADMIN — INSULIN LISPRO 1 UNITS: 100 INJECTION, SOLUTION INTRAVENOUS; SUBCUTANEOUS at 20:02

## 2020-09-10 RX ADMIN — PROPOFOL 50 MCG/KG/MIN: 10 INJECTION, EMULSION INTRAVENOUS at 15:39

## 2020-09-10 RX ADMIN — IPRATROPIUM BROMIDE AND ALBUTEROL SULFATE 1 AMPULE: .5; 3 SOLUTION RESPIRATORY (INHALATION) at 21:01

## 2020-09-10 RX ADMIN — FOLIC ACID 1 MG: 5 INJECTION, SOLUTION INTRAMUSCULAR; INTRAVENOUS; SUBCUTANEOUS at 08:17

## 2020-09-10 RX ADMIN — MIDAZOLAM 2 MG: 1 INJECTION INTRAMUSCULAR; INTRAVENOUS at 07:37

## 2020-09-10 RX ADMIN — Medication 50 MCG/HR: at 18:43

## 2020-09-10 RX ADMIN — Medication 200 MCG/HR: at 07:07

## 2020-09-10 RX ADMIN — AMPICILLIN SODIUM AND SULBACTAM SODIUM 3 G: 2; 1 INJECTION, POWDER, FOR SOLUTION INTRAMUSCULAR; INTRAVENOUS at 23:33

## 2020-09-10 RX ADMIN — METHYLPREDNISOLONE SODIUM SUCCINATE 80 MG: 125 INJECTION, POWDER, FOR SOLUTION INTRAMUSCULAR; INTRAVENOUS at 08:16

## 2020-09-10 RX ADMIN — AMPICILLIN SODIUM AND SULBACTAM SODIUM 3 G: 2; 1 INJECTION, POWDER, FOR SOLUTION INTRAMUSCULAR; INTRAVENOUS at 05:49

## 2020-09-10 RX ADMIN — PROPOFOL 50 MCG/KG/MIN: 10 INJECTION, EMULSION INTRAVENOUS at 11:43

## 2020-09-10 RX ADMIN — APIXABAN 5 MG: 5 TABLET, FILM COATED ORAL at 08:16

## 2020-09-10 RX ADMIN — IPRATROPIUM BROMIDE AND ALBUTEROL SULFATE 1 AMPULE: .5; 3 SOLUTION RESPIRATORY (INHALATION) at 08:38

## 2020-09-10 RX ADMIN — MIDAZOLAM 2 MG: 1 INJECTION INTRAMUSCULAR; INTRAVENOUS at 09:37

## 2020-09-10 RX ADMIN — PROPOFOL 50 MCG/KG/MIN: 10 INJECTION, EMULSION INTRAVENOUS at 07:37

## 2020-09-10 RX ADMIN — PROPOFOL 50 MCG/KG/MIN: 10 INJECTION, EMULSION INTRAVENOUS at 23:13

## 2020-09-10 RX ADMIN — QUETIAPINE FUMARATE 50 MG: 25 TABLET ORAL at 13:16

## 2020-09-10 RX ADMIN — IPRATROPIUM BROMIDE AND ALBUTEROL SULFATE 1 AMPULE: .5; 3 SOLUTION RESPIRATORY (INHALATION) at 16:28

## 2020-09-10 RX ADMIN — PROPOFOL 50 MCG/KG/MIN: 10 INJECTION, EMULSION INTRAVENOUS at 17:36

## 2020-09-10 RX ADMIN — MIDAZOLAM 2 MG: 1 INJECTION INTRAMUSCULAR; INTRAVENOUS at 05:39

## 2020-09-10 RX ADMIN — QUETIAPINE FUMARATE 50 MG: 25 TABLET ORAL at 20:01

## 2020-09-10 RX ADMIN — SODIUM CHLORIDE, PRESERVATIVE FREE 10 ML: 5 INJECTION INTRAVENOUS at 20:02

## 2020-09-10 RX ADMIN — PROPOFOL 50 MCG/KG/MIN: 10 INJECTION, EMULSION INTRAVENOUS at 05:44

## 2020-09-10 RX ADMIN — FAMOTIDINE 20 MG: 10 INJECTION INTRAVENOUS at 08:15

## 2020-09-10 RX ADMIN — PROPOFOL 50 MCG/KG/MIN: 10 INJECTION, EMULSION INTRAVENOUS at 04:03

## 2020-09-10 RX ADMIN — MIDAZOLAM 2 MG: 1 INJECTION INTRAMUSCULAR; INTRAVENOUS at 05:43

## 2020-09-10 RX ADMIN — PROPOFOL 50 MCG/KG/MIN: 10 INJECTION, EMULSION INTRAVENOUS at 02:20

## 2020-09-10 RX ADMIN — DEXTROSE AND SODIUM CHLORIDE: 5; 450 INJECTION, SOLUTION INTRAVENOUS at 09:40

## 2020-09-10 ASSESSMENT — PULMONARY FUNCTION TESTS
PIF_VALUE: 39
PIF_VALUE: 29
PIF_VALUE: 28
PIF_VALUE: 28
PIF_VALUE: 39
PIF_VALUE: 23
PIF_VALUE: 29
PIF_VALUE: 23
PIF_VALUE: 28
PIF_VALUE: 42
PIF_VALUE: 28
PIF_VALUE: 28
PIF_VALUE: 27
PIF_VALUE: 23
PIF_VALUE: 39
PIF_VALUE: 26
PIF_VALUE: 29
PIF_VALUE: 32
PIF_VALUE: 29
PIF_VALUE: 28
PIF_VALUE: 23
PIF_VALUE: 23
PIF_VALUE: 45
PIF_VALUE: 30
PIF_VALUE: 42
PIF_VALUE: 39
PIF_VALUE: 23
PIF_VALUE: 23
PIF_VALUE: 28
PIF_VALUE: 23
PIF_VALUE: 39
PIF_VALUE: 30
PIF_VALUE: 30
PIF_VALUE: 28
PIF_VALUE: 30

## 2020-09-10 ASSESSMENT — PAIN SCALES - GENERAL
PAINLEVEL_OUTOF10: 0

## 2020-09-10 NOTE — PLAN OF CARE
Problem: Restraint Use - Nonviolent/Non-Self-Destructive Behavior:  Goal: Absence of restraint-related injury  Description: Absence of restraint-related injury  9/9/2020 2218 by Corinne Vee  Outcome: Met This Shift     Problem: Restraint Use - Nonviolent/Non-Self-Destructive Behavior:  Goal: Absence of restraint indications  Description: Absence of restraint indications  9/9/2020 2218 by Corinne Vee  Outcome: Not Met This Shift

## 2020-09-10 NOTE — PROGRESS NOTES
No urine output after 0100, when adame was removed. Dr. Bernie Griffith notified. Bladder scanner showed 258 ml.

## 2020-09-10 NOTE — PLAN OF CARE
Problem: Skin Integrity:  Goal: Will show no infection signs and symptoms  Description: Will show no infection signs and symptoms  Outcome: Met This Shift     Problem: Skin Integrity:  Goal: Absence of new skin breakdown  Description: Absence of new skin breakdown  Outcome: Met This Shift     Problem: Falls - Risk of:  Goal: Will remain free from falls  Description: Will remain free from falls  Outcome: Met This Shift     Problem: Falls - Risk of:  Goal: Absence of physical injury  Description: Absence of physical injury  Outcome: Met This Shift     Problem: Restraint Use - Nonviolent/Non-Self-Destructive Behavior:  Goal: Absence of restraint-related injury  Description: Absence of restraint-related injury  9/9/2020 2325 by Scooby Larios  Outcome: Met This Shift     Problem: Restraint Use - Nonviolent/Non-Self-Destructive Behavior:  Goal: Absence of restraint indications  Description: Absence of restraint indications  9/9/2020 2325 by Scooby Larios  Outcome: Not Met This Shift

## 2020-09-10 NOTE — PROGRESS NOTES
Dr. Celia Dee notified that TLC was placed in ER and need to be replaced within 48 hours per management.

## 2020-09-10 NOTE — PLAN OF CARE
Problem: Restraint Use - Nonviolent/Non-Self-Destructive Behavior:  Goal: Absence of restraint indications  Description: Absence of restraint indications  Outcome: Not Met This Shift  Goal: Absence of restraint-related injury  Description: Absence of restraint-related injury  Outcome: Not Met This Shift

## 2020-09-10 NOTE — PROGRESS NOTES
Patient with no urine output since 0200 after given lasix. Bladder scanner 867mL - adame re-inserted.

## 2020-09-10 NOTE — PROGRESS NOTES
Multiple attempts made by multiple nurses to establish IV access. Vein finder also used without success. TLC still in place and intact. Pt history of IV drug use.

## 2020-09-10 NOTE — PROGRESS NOTES
Hospitalist Progress Note      PCP: Luis Enrique Berger MD    Date of Admission: 9/8/2020       Subjective: Pt was seen and examined in ICU with RN, sister at bedside. No acute event overnight; unable to participate in review of systems due to intubated, sedated state. Medications:  Reviewed    Infusion Medications    dextrose 5 % and 0.45 % NaCl 100 mL/hr at 09/10/20 0940    propofol 50 mcg/kg/min (09/10/20 0939)    fentaNYL 5 mcg/ml in 0.9%  ml infusion 200 mcg/hr (09/10/20 0707)    dextrose       Scheduled Medications    potassium chloride        ipratropium-albuterol  1 ampule Inhalation Q4H WA    methylPREDNISolone  80 mg Intravenous Daily    azithromycin  500 mg Intravenous Q24H    sodium chloride flush  10 mL Intravenous 2 times per day    chlorhexidine  15 mL Mouth/Throat BID    famotidine (PEPCID) injection  20 mg Intravenous BID    folic acid  1 mg Intravenous Daily    thiamine  100 mg Intravenous Daily    ampicillin-sulbactam  3 g Intravenous Q6H    apixaban  5 mg Oral BID    insulin lispro  0-6 Units Subcutaneous TID WC    insulin lispro  0-3 Units Subcutaneous Nightly     PRN Meds: polyethylene glycol, sodium chloride flush, midazolam, albuterol, acetaminophen **OR** acetaminophen, promethazine **OR** ondansetron, glucose, dextrose, glucagon (rDNA), dextrose      Intake/Output Summary (Last 24 hours) at 9/10/2020 1022  Last data filed at 9/10/2020 1000  Gross per 24 hour   Intake 4753.1 ml   Output 4250 ml   Net 503.1 ml       Exam:    BP (!) 117/57   Pulse 68   Temp 98.6 °F (37 °C) (Bladder)   Resp 30   Ht 6' 5\" (1.956 m)   Wt (!) 361 lb 8.9 oz (164 kg)   SpO2 93%   BMI 42.87 kg/m²     General appearance: Intubated, on full vent support. No apparent distress. Respiratory: Good air entry bilaterally. Cardiovascular: Normal S1/S2. Regular rhythm and rate. Abdomen: Soft, non-tender, non-distended with normal bowel sounds.   Musculoskeletal: No clubbing, cyanosis or edema bilaterally. Skin: Skin color, texture, turgor normal.  No rashes or lesions. Neurologic:  Sedated  Peripheral Pulses: +2 palpable, equal bilaterally       Labs:   Recent Labs     09/08/20  2335 09/09/20  0409 09/10/20  0354   WBC 12.0* 10.2 10.3   HGB 13.7 14.2 13.4   HCT 43.8 44.7 42.1    226 215     Recent Labs     09/08/20 2335 09/09/20 0409 09/10/20  0354    140 139   K 3.7 3.9 3.4*   CL 98 101 104   CO2 28 29 25   BUN 14 14 11   CREATININE 1.2 1.2 1.2   CALCIUM 8.5* 8.7 8.5*     Recent Labs     09/08/20  2335 09/09/20  0409 09/10/20  0354   AST 20 20 13   ALT 15 15 13   BILITOT 0.6 0.7 0.4   ALKPHOS 56 58 48     No results for input(s): INR in the last 72 hours. Recent Labs     09/08/20  1523   TROPONINI <0.01       Radiology:  XR CHEST PORTABLE   Final Result   Improving aeration at the bases, left more so than right. See above. XR CHEST PORTABLE   Final Result   Stable abnormal chest with unchanged bibasilar infiltrates/atelectasis   and pleural effusions which may be due to pneumonia or edema. XR CHEST PORTABLE   Final Result   Partial clearing of right-sided infiltrates               CT CHEST WO CONTRAST   Final Result      Bibasilar consolidations likely infectious/inflammatory in etiology. Recommend dedicated chest CT in 3 months to demonstrate resolution. CT Head WO Contrast   Final Result   Unremarkable brain. Suspected nasal polyps. XR ABDOMEN FOR NG/OG/NE TUBE PLACEMENT   Final Result   Status post endotracheal and nasogastric intubation as noted. Increasing airspace disease on the right. XR CHEST PORTABLE   Final Result   Status post endotracheal and nasogastric intubation as noted. Increasing airspace disease on the right. XR CHEST PORTABLE   Final Result   Suboptimal inspiration which in this case may mimic congestive   failure. Consider a two-view study.             XR CHEST PORTABLE

## 2020-09-10 NOTE — PROGRESS NOTES
Pt continues to pull at lines and reach for ETT tube when unrestrained. Continued need for them at this time.

## 2020-09-11 ENCOUNTER — APPOINTMENT (OUTPATIENT)
Dept: GENERAL RADIOLOGY | Age: 55
DRG: 816 | End: 2020-09-11
Payer: MEDICAID

## 2020-09-11 LAB
AADO2: 479.5 MMHG
ALBUMIN SERPL-MCNC: 2.7 G/DL (ref 3.5–5.2)
ALP BLD-CCNC: 44 U/L (ref 40–129)
ALT SERPL-CCNC: 10 U/L (ref 0–40)
ANION GAP SERPL CALCULATED.3IONS-SCNC: 10 MMOL/L (ref 7–16)
AST SERPL-CCNC: 10 U/L (ref 0–39)
B.E.: -4.7 MMOL/L (ref -3–3)
BASOPHILS ABSOLUTE: 0.01 E9/L (ref 0–0.2)
BASOPHILS RELATIVE PERCENT: 0.1 % (ref 0–2)
BILIRUB SERPL-MCNC: 0.3 MG/DL (ref 0–1.2)
BUN BLDV-MCNC: 13 MG/DL (ref 6–20)
CALCIUM SERPL-MCNC: 8.7 MG/DL (ref 8.6–10.2)
CHLORIDE BLD-SCNC: 104 MMOL/L (ref 98–107)
CO2: 22 MMOL/L (ref 22–29)
COHB: 0.5 % (ref 0–1.5)
CREAT SERPL-MCNC: 1.1 MG/DL (ref 0.7–1.2)
CRITICAL: ABNORMAL
CULTURE, RESPIRATORY: NORMAL
DATE ANALYZED: ABNORMAL
DATE OF COLLECTION: ABNORMAL
EOSINOPHILS ABSOLUTE: 0 E9/L (ref 0.05–0.5)
EOSINOPHILS RELATIVE PERCENT: 0 % (ref 0–6)
FIO2: 90 %
GFR AFRICAN AMERICAN: >60
GFR NON-AFRICAN AMERICAN: >60 ML/MIN/1.73
GLUCOSE BLD-MCNC: 115 MG/DL (ref 74–99)
HCO3: 19.8 MMOL/L (ref 22–26)
HCT VFR BLD CALC: 40.8 % (ref 37–54)
HEMOGLOBIN: 12.9 G/DL (ref 12.5–16.5)
HHB: 2.2 % (ref 0–5)
IMMATURE GRANULOCYTES #: 0.06 E9/L
IMMATURE GRANULOCYTES %: 0.6 % (ref 0–5)
LAB: ABNORMAL
LYMPHOCYTES ABSOLUTE: 1.38 E9/L (ref 1.5–4)
LYMPHOCYTES RELATIVE PERCENT: 12.7 % (ref 20–42)
Lab: ABNORMAL
MCH RBC QN AUTO: 27.2 PG (ref 26–35)
MCHC RBC AUTO-ENTMCNC: 31.6 % (ref 32–34.5)
MCV RBC AUTO: 85.9 FL (ref 80–99.9)
METER GLUCOSE: 90 MG/DL (ref 74–99)
METER GLUCOSE: 90 MG/DL (ref 74–99)
METHB: 0.4 % (ref 0–1.5)
MODE: AC
MONOCYTES ABSOLUTE: 0.49 E9/L (ref 0.1–0.95)
MONOCYTES RELATIVE PERCENT: 4.5 % (ref 2–12)
NEUTROPHILS ABSOLUTE: 8.91 E9/L (ref 1.8–7.3)
NEUTROPHILS RELATIVE PERCENT: 82.1 % (ref 43–80)
O2 CONTENT: 18.9 ML/DL
O2 SATURATION: 97.8 % (ref 92–98.5)
O2HB: 96.9 % (ref 94–97)
OPERATOR ID: ABNORMAL
PATIENT TEMP: 37 C
PCO2: 34.9 MMHG (ref 35–45)
PDW BLD-RTO: 15.9 FL (ref 11.5–15)
PEEP/CPAP: 14 CMH2O
PFO2: 1.15 MMHG/%
PH BLOOD GAS: 7.37 (ref 7.35–7.45)
PLATELET # BLD: 208 E9/L (ref 130–450)
PMV BLD AUTO: 8.8 FL (ref 7–12)
PO2: 103.9 MMHG (ref 75–100)
POTASSIUM SERPL-SCNC: 3.8 MMOL/L (ref 3.5–5)
RBC # BLD: 4.75 E12/L (ref 3.8–5.8)
RI(T): 4.62
RR MECHANICAL: 30 B/MIN
SMEAR, RESPIRATORY: NORMAL
SODIUM BLD-SCNC: 136 MMOL/L (ref 132–146)
SOURCE, BLOOD GAS: ABNORMAL
THB: 13.8 G/DL (ref 11.5–16.5)
TIME ANALYZED: 526
TOTAL PROTEIN: 6.1 G/DL (ref 6.4–8.3)
URINE CULTURE, ROUTINE: NORMAL
VT MECHANICAL: 450 ML
WBC # BLD: 10.9 E9/L (ref 4.5–11.5)

## 2020-09-11 PROCEDURE — 6360000002 HC RX W HCPCS: Performed by: INTERNAL MEDICINE

## 2020-09-11 PROCEDURE — 1200000000 HC SEMI PRIVATE

## 2020-09-11 PROCEDURE — 6370000000 HC RX 637 (ALT 250 FOR IP): Performed by: STUDENT IN AN ORGANIZED HEALTH CARE EDUCATION/TRAINING PROGRAM

## 2020-09-11 PROCEDURE — 85025 COMPLETE CBC W/AUTO DIFF WBC: CPT

## 2020-09-11 PROCEDURE — 2700000000 HC OXYGEN THERAPY PER DAY

## 2020-09-11 PROCEDURE — 6360000002 HC RX W HCPCS: Performed by: EMERGENCY MEDICINE

## 2020-09-11 PROCEDURE — 6370000000 HC RX 637 (ALT 250 FOR IP): Performed by: INTERNAL MEDICINE

## 2020-09-11 PROCEDURE — 82962 GLUCOSE BLOOD TEST: CPT

## 2020-09-11 PROCEDURE — 94640 AIRWAY INHALATION TREATMENT: CPT

## 2020-09-11 PROCEDURE — 6370000000 HC RX 637 (ALT 250 FOR IP): Performed by: EMERGENCY MEDICINE

## 2020-09-11 PROCEDURE — 71045 X-RAY EXAM CHEST 1 VIEW: CPT

## 2020-09-11 PROCEDURE — 36415 COLL VENOUS BLD VENIPUNCTURE: CPT

## 2020-09-11 PROCEDURE — 2500000003 HC RX 250 WO HCPCS: Performed by: EMERGENCY MEDICINE

## 2020-09-11 PROCEDURE — 2580000003 HC RX 258: Performed by: EMERGENCY MEDICINE

## 2020-09-11 PROCEDURE — 2580000003 HC RX 258: Performed by: INTERNAL MEDICINE

## 2020-09-11 PROCEDURE — 37799 UNLISTED PX VASCULAR SURGERY: CPT

## 2020-09-11 PROCEDURE — 94003 VENT MGMT INPAT SUBQ DAY: CPT

## 2020-09-11 PROCEDURE — 82805 BLOOD GASES W/O2 SATURATION: CPT

## 2020-09-11 PROCEDURE — 2500000003 HC RX 250 WO HCPCS: Performed by: INTERNAL MEDICINE

## 2020-09-11 PROCEDURE — 80053 COMPREHEN METABOLIC PANEL: CPT

## 2020-09-11 RX ORDER — LORAZEPAM 2 MG/ML
2 INJECTION INTRAMUSCULAR
Status: DISCONTINUED | OUTPATIENT
Start: 2020-09-11 | End: 2020-09-13 | Stop reason: HOSPADM

## 2020-09-11 RX ORDER — SODIUM CHLORIDE 0.9 % (FLUSH) 0.9 %
10 SYRINGE (ML) INJECTION EVERY 12 HOURS SCHEDULED
Status: DISCONTINUED | OUTPATIENT
Start: 2020-09-11 | End: 2020-09-13 | Stop reason: HOSPADM

## 2020-09-11 RX ORDER — LORAZEPAM 1 MG/1
3 TABLET ORAL
Status: DISCONTINUED | OUTPATIENT
Start: 2020-09-11 | End: 2020-09-13 | Stop reason: HOSPADM

## 2020-09-11 RX ORDER — LORAZEPAM 1 MG/1
4 TABLET ORAL
Status: DISCONTINUED | OUTPATIENT
Start: 2020-09-11 | End: 2020-09-13 | Stop reason: HOSPADM

## 2020-09-11 RX ORDER — LORAZEPAM 2 MG/ML
3 INJECTION INTRAMUSCULAR
Status: DISCONTINUED | OUTPATIENT
Start: 2020-09-11 | End: 2020-09-13 | Stop reason: HOSPADM

## 2020-09-11 RX ORDER — LORAZEPAM 1 MG/1
1 TABLET ORAL
Status: DISCONTINUED | OUTPATIENT
Start: 2020-09-11 | End: 2020-09-13 | Stop reason: HOSPADM

## 2020-09-11 RX ORDER — SODIUM CHLORIDE 0.9 % (FLUSH) 0.9 %
10 SYRINGE (ML) INJECTION PRN
Status: DISCONTINUED | OUTPATIENT
Start: 2020-09-11 | End: 2020-09-13 | Stop reason: HOSPADM

## 2020-09-11 RX ORDER — LORAZEPAM 2 MG/ML
1 INJECTION INTRAMUSCULAR
Status: DISCONTINUED | OUTPATIENT
Start: 2020-09-11 | End: 2020-09-13 | Stop reason: HOSPADM

## 2020-09-11 RX ORDER — AMOXICILLIN AND CLAVULANATE POTASSIUM 875; 125 MG/1; MG/1
1 TABLET, FILM COATED ORAL EVERY 12 HOURS SCHEDULED
Status: DISCONTINUED | OUTPATIENT
Start: 2020-09-11 | End: 2020-09-13 | Stop reason: HOSPADM

## 2020-09-11 RX ORDER — LORAZEPAM 1 MG/1
2 TABLET ORAL
Status: DISCONTINUED | OUTPATIENT
Start: 2020-09-11 | End: 2020-09-13 | Stop reason: HOSPADM

## 2020-09-11 RX ORDER — LORAZEPAM 2 MG/ML
4 INJECTION INTRAMUSCULAR
Status: DISCONTINUED | OUTPATIENT
Start: 2020-09-11 | End: 2020-09-13 | Stop reason: HOSPADM

## 2020-09-11 RX ADMIN — BUPRENORPHINE HCL 2 MG: 2 TABLET SUBLINGUAL at 11:58

## 2020-09-11 RX ADMIN — IPRATROPIUM BROMIDE AND ALBUTEROL SULFATE 1 AMPULE: .5; 3 SOLUTION RESPIRATORY (INHALATION) at 08:30

## 2020-09-11 RX ADMIN — APIXABAN 5 MG: 5 TABLET, FILM COATED ORAL at 19:50

## 2020-09-11 RX ADMIN — Medication 2 MG: at 22:16

## 2020-09-11 RX ADMIN — THIAMINE HYDROCHLORIDE 200 MG: 100 INJECTION, SOLUTION INTRAMUSCULAR; INTRAVENOUS at 09:19

## 2020-09-11 RX ADMIN — FAMOTIDINE 20 MG: 10 INJECTION INTRAVENOUS at 09:13

## 2020-09-11 RX ADMIN — AMPICILLIN SODIUM AND SULBACTAM SODIUM 3 G: 2; 1 INJECTION, POWDER, FOR SOLUTION INTRAMUSCULAR; INTRAVENOUS at 05:30

## 2020-09-11 RX ADMIN — BUPRENORPHINE HCL 2 MG: 2 TABLET SUBLINGUAL at 13:12

## 2020-09-11 RX ADMIN — PROPOFOL 50 MCG/KG/MIN: 10 INJECTION, EMULSION INTRAVENOUS at 08:03

## 2020-09-11 RX ADMIN — CHLORHEXIDINE GLUCONATE 15 ML: 1.2 RINSE ORAL at 09:20

## 2020-09-11 RX ADMIN — SODIUM CHLORIDE, PRESERVATIVE FREE 10 ML: 5 INJECTION INTRAVENOUS at 12:01

## 2020-09-11 RX ADMIN — SODIUM CHLORIDE, PRESERVATIVE FREE 10 ML: 5 INJECTION INTRAVENOUS at 09:18

## 2020-09-11 RX ADMIN — CLONIDINE HYDROCHLORIDE 0.1 MG: 0.1 TABLET ORAL at 11:58

## 2020-09-11 RX ADMIN — PROPOFOL 50 MCG/KG/MIN: 10 INJECTION, EMULSION INTRAVENOUS at 05:11

## 2020-09-11 RX ADMIN — DEXTROSE AND SODIUM CHLORIDE: 5; 450 INJECTION, SOLUTION INTRAVENOUS at 05:11

## 2020-09-11 RX ADMIN — LORAZEPAM 1 MG: 1 TABLET ORAL at 13:20

## 2020-09-11 RX ADMIN — FOLIC ACID 1 MG: 5 INJECTION, SOLUTION INTRAMUSCULAR; INTRAVENOUS; SUBCUTANEOUS at 09:16

## 2020-09-11 RX ADMIN — IPRATROPIUM BROMIDE AND ALBUTEROL SULFATE 1 AMPULE: .5; 3 SOLUTION RESPIRATORY (INHALATION) at 17:24

## 2020-09-11 RX ADMIN — AMPICILLIN SODIUM AND SULBACTAM SODIUM 3 G: 2; 1 INJECTION, POWDER, FOR SOLUTION INTRAMUSCULAR; INTRAVENOUS at 11:59

## 2020-09-11 RX ADMIN — ACETAMINOPHEN 650 MG: 325 TABLET, FILM COATED ORAL at 19:50

## 2020-09-11 RX ADMIN — PROPOFOL 50 MCG/KG/MIN: 10 INJECTION, EMULSION INTRAVENOUS at 09:55

## 2020-09-11 RX ADMIN — IPRATROPIUM BROMIDE AND ALBUTEROL SULFATE 1 AMPULE: .5; 3 SOLUTION RESPIRATORY (INHALATION) at 13:02

## 2020-09-11 RX ADMIN — LORAZEPAM 1 MG: 1 TABLET ORAL at 11:58

## 2020-09-11 RX ADMIN — AMOXICILLIN AND CLAVULANATE POTASSIUM 1 TABLET: 875; 125 TABLET, FILM COATED ORAL at 19:50

## 2020-09-11 RX ADMIN — SODIUM CHLORIDE, PRESERVATIVE FREE 10 ML: 5 INJECTION INTRAVENOUS at 12:00

## 2020-09-11 RX ADMIN — QUETIAPINE FUMARATE 50 MG: 25 TABLET ORAL at 22:16

## 2020-09-11 RX ADMIN — APIXABAN 5 MG: 5 TABLET, FILM COATED ORAL at 09:21

## 2020-09-11 RX ADMIN — PROPOFOL 50 MCG/KG/MIN: 10 INJECTION, EMULSION INTRAVENOUS at 01:10

## 2020-09-11 RX ADMIN — QUETIAPINE FUMARATE 50 MG: 25 TABLET ORAL at 09:17

## 2020-09-11 RX ADMIN — PROPOFOL 50 MCG/KG/MIN: 10 INJECTION, EMULSION INTRAVENOUS at 03:20

## 2020-09-11 ASSESSMENT — PAIN SCALES - GENERAL
PAINLEVEL_OUTOF10: 0
PAINLEVEL_OUTOF10: 5
PAINLEVEL_OUTOF10: 7
PAINLEVEL_OUTOF10: 0

## 2020-09-11 ASSESSMENT — PAIN DESCRIPTION - ONSET: ONSET: GRADUAL

## 2020-09-11 ASSESSMENT — PULMONARY FUNCTION TESTS
PIF_VALUE: 29
PIF_VALUE: 29
PIF_VALUE: 28
PIF_VALUE: 29
PIF_VALUE: 28
PIF_VALUE: 26
PIF_VALUE: 49

## 2020-09-11 ASSESSMENT — PAIN DESCRIPTION - PAIN TYPE: TYPE: ACUTE PAIN

## 2020-09-11 ASSESSMENT — PAIN DESCRIPTION - ORIENTATION: ORIENTATION: RIGHT;LEFT

## 2020-09-11 ASSESSMENT — PAIN - FUNCTIONAL ASSESSMENT: PAIN_FUNCTIONAL_ASSESSMENT: PREVENTS OR INTERFERES SOME ACTIVE ACTIVITIES AND ADLS

## 2020-09-11 ASSESSMENT — PAIN DESCRIPTION - PROGRESSION: CLINICAL_PROGRESSION: GRADUALLY WORSENING

## 2020-09-11 ASSESSMENT — PAIN DESCRIPTION - DESCRIPTORS: DESCRIPTORS: HEADACHE

## 2020-09-11 ASSESSMENT — PAIN DESCRIPTION - FREQUENCY: FREQUENCY: INTERMITTENT

## 2020-09-11 ASSESSMENT — PAIN DESCRIPTION - LOCATION: LOCATION: HEAD

## 2020-09-11 NOTE — PROGRESS NOTES
[address] [facility]   Department of Internal Medicine   Internal Medicine Residency  MICU Progress Note    Patient:  Troy Torres 48 y.o. male   MRN: [id number]       Date of Service: 2020    Allergy: Ativan [lorazepam] and Sulfa antibiotics    Subjective     Patient was seen and examined in AM. Intubated, sedated. PS. Agitated, ?withdrawal. Back on AC    24 hour change: Stable overnight. No acute overnight issues reported. Objective     TEMPERATURE:  Current - Temp: 98 °F (36.7 °C); Max - Temp  Av °F (36.7 °C)  Min: 97.5 °F (36.4 °C)  Max: 98.2 °F (36.8 °C)  RESPIRATIONS RANGE: Resp  Av.1  Min: 13  Max: 31  PULSE RANGE: Pulse  Av.2  Min: 54  Max: 82  BLOOD PRESSURE RANGE:  Systolic (35MWW), BVN:616 , Min:99 , YOU:722   ; Diastolic (89XYM), KKL:84, Min:50, Max:94    PULSE OXIMETRY RANGE: SpO2  Av.6 %  Min: 87 %  Max: 100 %    I & O - 24hr:    Intake/Output Summary (Last 24 hours) at 2020 1540  Last data filed at 2020 1300  Gross per 24 hour   Intake 4042.37 ml   Output 5740 ml   Net -1697.63 ml     I/O last 3 completed shifts: In: 4042.4 [P.O.:340; I.V.:3302.4; IV Piggyback:400]  Out: 3235 [Urine:5740] No intake/output data recorded. Weight change: 7 lb 0.9 oz (3.2 kg)    Physical Exam:  General Appearance:    Obese; intubated   HEENT:    NC/AT, mucous membranes are moist   Neck:   Supple, no jugular venous distention. Resp:     CTAB, No wheezes, No rhonchi, no use of accessory muscles   Heart:    RRR, S1 and S2 normal, no murmur, rub or gallop.     Abdomen:     Soft, non-tender, non-distended with normal bowel sounds   Extremities:   Atraumatic, no cyanosis or edema   Pulses:  Radial and pedal pulses are intact bilaterally   Neurologic:  sedated       Medications     Continuous Infusions:   dextrose 5 % and 0.45 % NaCl 100 mL/hr at 20 0511    propofol Stopped (20 1038)    fentaNYL 5 mcg/ml in 0.9%  ml infusion 25 mcg/hr (20 0546)   Katlyn Tian dextrose       Scheduled Meds:   sodium chloride flush  10 mL Intravenous 2 times per day    melatonin ER  2 mg Oral Nightly    QUEtiapine  50 mg Oral BID    ipratropium-albuterol  1 ampule Inhalation Q4H WA    sodium chloride flush  10 mL Intravenous 2 times per day    chlorhexidine  15 mL Mouth/Throat BID    famotidine (PEPCID) injection  20 mg Intravenous BID    folic acid  1 mg Intravenous Daily    thiamine  100 mg Intravenous Daily    ampicillin-sulbactam  3 g Intravenous Q6H    apixaban  5 mg Oral BID    insulin lispro  0-6 Units Subcutaneous TID WC    insulin lispro  0-3 Units Subcutaneous Nightly     PRN Meds: sodium chloride flush, LORazepam **OR** LORazepam **OR** LORazepam **OR** LORazepam **OR** LORazepam **OR** LORazepam **OR** LORazepam **OR** LORazepam, buprenorphine **AND** cloNIDine, hydrOXYzine, polyethylene glycol, sodium chloride flush, midazolam, albuterol, acetaminophen **OR** acetaminophen, promethazine **OR** ondansetron, glucose, dextrose, glucagon (rDNA), dextrose  Nutrition:   NG/OG tube TF type: Pulmocare/Nephro/Glucerna/Jevity        At rate: ml/h    Labs and Imaging Studies     CBC:   Recent Labs     09/09/20  0409 09/10/20  0354 09/11/20  0359   WBC 10.2 10.3 10.9   HGB 14.2 13.4 12.9   HCT 44.7 42.1 40.8   MCV 84.8 85.2 85.9    215 208       BMP:    Recent Labs     09/09/20  0409 09/10/20  0354 09/11/20  0359    139 136   K 3.9 3.4* 3.8    104 104   CO2 29 25 22   BUN 14 11 13   CREATININE 1.2 1.2 1.1   GLUCOSE 122* 139* 115*       LIVER PROFILE:   Recent Labs     09/09/20  0409 09/10/20  0354 09/11/20  0359   AST 20 13 10   ALT 15 13 10   BILITOT 0.7 0.4 0.3   ALKPHOS 58 48 44       PT/INR:   No results for input(s): PROTIME, INR in the last 72 hours. APTT:   No results for input(s): APTT in the last 72 hours.     Fasting Lipid Panel:    Lab Results   Component Value Date    CHOL 230 02/07/2017    TRIG 383 02/07/2017    HDL 37 02/07/2017 Cardiac Enzymes:    Lab Results   Component Value Date    CKTOTAL 226 (H) 07/09/2017    TROPONINI <0.01 09/08/2020    TROPONINI <0.01 05/23/2020    TROPONINI <0.01 07/27/2019       Notable Cultures:      Blood cultures   Blood Culture, Routine   Date Value Ref Range Status   09/08/2020 24 Hours no growth  Preliminary     Respiratory cultures No results found for: RESPCULTURE No results found for: LABGRAM  Urine   Urine Culture, Routine   Date Value Ref Range Status   09/08/2020 Growth not present  Final     Legionella No results found for: LABLEGI  C Diff PCR No results found for: CDIFPCR  Wound culture/abscess: No results for input(s): WNDABS in the last 72 hours. Tip culture:No results for input(s): CXCATHTIP in the last 72 hours.      Antibiotic  Days  Day started                                Oxygen:     Vent Information  $Ventilation: (S) Off Vent  Equipment ID: [id number]  Equipment Changed: Humidification  Vent Type: 980  Vent Mode: AC/VC  Vt Ordered: 450 mL  Rate Set: 30 bmp  Peak Flow: 65 L/min  Pressure Support: 0 cmH20  FiO2 : 80 %  SpO2: 94 %  SpO2/FiO2 ratio: 111.11  PaO2/FiO2 ratio: 0.7  Sensitivity: 3  PEEP/CPAP: 14  I Time/ I Time %: 0 s  Humidification Source: Heated wire  Humidification Temp: 37  Humidification Temp Measured: 37  Circuit Condensation: Drained  Additional Respiratory  Assessments  Pulse: 80  Resp: 16  SpO2: 94 %  $End Tidal CO2: 69  Position: Semi-Soto's  Humidification Source: Heated wire  Humidification Temp: 37  Circuit Condensation: Drained  Oral Care: Mouth suctioned  Subglottic Suction Done?: Yes  Airway Type: ET  Airway Size: 8  Cuff Pressure (cm H2O): 29 cm H2O     Nasal cannula L/min     Face mask %     Reservoirs mask %       ABG   Vent Settings     PH   Mode      PCO2   TV      PO2   RR      HCO3   PS      Sat%   PEEP      FIO2   FIO2        P/F          Lines:  Site  Day  Date inserted     TLC              PICC              Arterial line              Peripheral line infectious/inflammatory in etiology. Recommend dedicated chest CT in 3 months to demonstrate resolution. Xr Chest Portable    Result Date: 2020  Patient MRN:  [id number] : 1965 Age: 48 years Gender: Male Order Date:  2020 6:45 AM EXAM: XR CHEST PORTABLE one image INDICATION:  PNA PNA COMPARISON: 2020 FINDINGS: There is cardiomegaly. Life-support lines and catheters are unchanged. There is persistent perihilar and bibasilar atelectasis/infiltrates and pleural effusions. Stable abnormal chest with unchanged bibasilar infiltrates/atelectasis and pleural effusions which may be due to pneumonia or edema. Xr Chest Portable    Result Date: 2020  Patient MRN:  [id number] : 1965 Age: 48 years Gender: Male Order Date:  2020 3:35 AM EXAM: XR CHEST PORTABLE INDICATION:  PNA PNA COMPARISON: 2020 FINDINGS:  There is been partial clearing of right-sided infiltrates since the prior study. Left lower lobe infiltrate is unchanged. NG tube is appropriate. There are no significant effusions. Upper lobes are normal.     Partial clearing of right-sided infiltrates     Xr Chest Portable    Result Date: 2020  Patient MRN:  [id number] : 1965 Age: 48 years Gender: Male Order Date:  2020 5:20 PM EXAM: XR ABDOMEN FOR NG/OG/NE TUBE PLACEMENT, XR CHEST PORTABLE NUMBER OF IMAGES:  2 view(s) INDICATION:  Confirmation of course of NG/OG/NE tube and location of tip of tube Confirmation of course of NG/OG/NE tube and location of tip of tube Portable? ->Yes COMPARISON: Single view chest dated 2020, 1508 hours. FINDINGS: Endotracheal tube is just beyond the thoracic inlet. Nasogastric tube is in the stomach. There is somewhat increasing airspace disease on the right and there is infiltrate and/or atelectasis on the left which is not greatly changed. No active process in the upper abdomen is demonstrated. The pelvis was not imaged.      Status post endotracheal and nasogastric intubation as noted. Increasing airspace disease on the right. Xr Chest Portable    Result Date: 2020  Patient MRN: [id number] : 1965 Age:  48 years Gender: Male Order Date: 2020 3:16 PM Exam: XR CHEST PORTABLE Number of Images: 1 view Indication:   shortness of breath Comparison: 23 May 2020 FINDINGS: Suboptimal inspiration. Crowding of bronchovascular markings in this case may mimic congestive failure. Consider 2 view PA and lateral chest for more optimal evaluation. Neither costophrenic angle is blunted. Suboptimal inspiration which in this case may mimic congestive failure. Consider a two-view study. Xr Abdomen For Ng/og/ne Tube Placement    Result Date: 2020  Patient MRN:  [id number] : 1965 Age: 48 years Gender: Male Order Date:  2020 5:20 PM EXAM: XR ABDOMEN FOR NG/OG/NE TUBE PLACEMENT, XR CHEST PORTABLE NUMBER OF IMAGES:  2 view(s) INDICATION:  Confirmation of course of NG/OG/NE tube and location of tip of tube Confirmation of course of NG/OG/NE tube and location of tip of tube Portable? ->Yes COMPARISON: Single view chest dated 2020, 1508 hours. FINDINGS: Endotracheal tube is just beyond the thoracic inlet. Nasogastric tube is in the stomach. There is somewhat increasing airspace disease on the right and there is infiltrate and/or atelectasis on the left which is not greatly changed. No active process in the upper abdomen is demonstrated. The pelvis was not imaged. Status post endotracheal and nasogastric intubation as noted. Increasing airspace disease on the right. Resident's Assessment and Plan     Assessment:      1. Acute hypercapnic and hypoxic respiratory failure: 2/2 aspiration PNA and  respiratory depression from drug overdose, and aspiration pneumonia/pneumonitis. Poor response to Oxygen is suggestive of a shunt. 2. Acute encephalopathy 2/2 drug abuse and hypercapnic respiratory failure. UDS +ve for polysubstance  3. Aspiration PNA 2/2 AMS, polysubstance abuse  4. Ho Obstructive sleep apnea. 5. Ho COPD. 6. Ho Hyperlipidemia  7. Ho Hypertension. 8. Ho History of unprovoked pulmonary embolism: On Eliquis 5 mg twice daily. 9. Ho Tobacoo abuse   10. Ho Polysubstance abuse     Plan:     · Extubated this am  · Started on Augmentin  · Titrate tidal volume according to pH  · Increase PEEP for target PO2 more than 55-60. · Weaning sedation  · starting Opioid withdrawal protocol with Buprenorphine and clonidine  · Started Seroquel 50 bid  · Dc Unasyn   · Hold Antihypertensive medications  · Hold Depression medications till further evaluation. · Concern for suicidal attempt per the sister due to uncontrolled depression. · Started on COWS and CIWA protocol  · Transfer      # Peptic ulcer prophylaxis: Pepcid  # DVT Prophylaxis: Eliquis  # Disposition: Cont current care     [address] DO, PGY-2  Internal medicine resident  Attending Physician: Dr. Jaron Acuna    I personally saw, examined and provided care for the patient. Radiographs, labs and medication list were reviewed by me independently. I spoke with bedside nursing, therapists and consultants. Critical care services and times documented are independent of procedures and multidisciplinary rounds with Residents. Additionally comprehensive, multidisciplinary rounds were conducted with the MICU team. The case was discussed in detail and plans for care were established. Review of Residents documentation was conducted and revisions were made as appropriate. I agree with the above documented exam, problem list and plan of care. psv trial this am with extubation   Abx to oral     Valentina Blanc M.D.    Pulmonary/Critical Care Medicine   35 min cct excluding procedures

## 2020-09-11 NOTE — CARE COORDINATION
Patient remains in MICU on vent with prop and fent gtts. UDS (+) barbiturates, benzos, methadone, opiates, cocaine. Patient's sister Aaron Castro called me back this morning requesting I place a referral to [facility] at [facility]- referral given to [address] I requested Aaron Castro continue reviewing [facility] facilities for back up choices. I also asked Aaron Castro to choice a facility from the [place] [address] list also in case he meets LTAC criteria at discharge. CM/SW will continue to follow for discharge planning.

## 2020-09-11 NOTE — PLAN OF CARE
Problem: Skin Integrity:  Goal: Will show no infection signs and symptoms  Description: Will show no infection signs and symptoms  Outcome: Met This Shift  Goal: Absence of new skin breakdown  Description: Absence of new skin breakdown  Outcome: Met This Shift     Problem: Falls - Risk of:  Goal: Will remain free from falls  Description: Will remain free from falls  Outcome: Met This Shift  Goal: Absence of physical injury  Description: Absence of physical injury  Outcome: Met This Shift     Problem: Restraint Use - Nonviolent/Non-Self-Destructive Behavior:  Goal: Absence of restraint indications  Description: Absence of restraint indications  9/11/2020 1226 by Michaela Diego RN  Outcome: Completed  9/11/2020 0013 by Juan Balderas RN  Outcome: Not Met This Shift  Goal: Absence of restraint-related injury  Description: Absence of restraint-related injury  9/11/2020 1226 by Michaela Diego RN  Outcome: Completed  9/11/2020 0013 by Juan Balderas RN  Outcome: Met This Shift

## 2020-09-11 NOTE — PROGRESS NOTES
Notified Dr. Isi Archer that patient removed IV site and we had multiple unsuccessful attempts. Aware that patient is refusing to let us try again. He said it is okay for patient to transfer to the floor with no IV site.

## 2020-09-11 NOTE — PROGRESS NOTES
Patient was extubated to 6 liters/min via nasal cannula. Stridor was not present post extubation. SPO2 was 96%.       Performed by  Gui Schroeder

## 2020-09-11 NOTE — PROGRESS NOTES
Comprehensive Nutrition Assessment    Type and Reason for Visit:  Initial    Nutrition Recommendations/Plan: Modify Tube Feeding  Current EN regimen not meeting estimated needs. S/p recent extubation. If EN to be continued would recommend: Immune Enhancing @80ml/h to provide: 1920ml, 2880kcals, 180gm pro, 1457ml water    If less volume preferred would recommend Fluid Restricted @60ml/hr w/ protein modular daily to provide: 1440ml 2880kcals, 120gm pro, 1008ml water (with protein modular daily: 2984kcals, 146gm pro)    Nutrition Assessment:  Pt admit 2/2 resp distress r/t OD, noted AMS. S/p recent extubation. EN ordered. Malnutrition Assessment:  Malnutrition Status:  No malnutrition    Context:  Acute Illness     Findings of the 6 clinical characteristics of malnutrition:  Energy Intake:  No significant decrease in energy intake  Weight Loss:  Unable to assess     Body Fat Loss:  No significant body fat loss     Muscle Mass Loss:  No significant muscle mass loss    Fluid Accumulation:  No significant fluid accumulation     Strength:  Not Performed    Estimated Daily Nutrient Needs:  Energy (kcal):  MS 2637 x1.1= 2900; ; Weight Used for Energy Requirements:  Current     Protein (g):  140-180(1.5-2.0gm/kg IBW);  Weight Used for Protein Requirements:  Ideal          Nutrition Related Findings:  s/p recent extubation, rotund abd, active BS, - I/Os       Current Nutrition Therapies:    Current Tube Feeding (TF) Orders:  · Feeding Route: (no route at this time)  · Formula: 1.5 Calorie without Fiber  · Current TF & Flush Orders Provides: no rate ordered at this time    Anthropometric Measures:  · Height: 6' 5\" (195.6 cm)  · Current Body Weight: 368 lb (166.9 kg)(9/11 actual)   · Admission Body Weight: 365 lb (165.6 kg)(9/8 actual)    · Usual Body Weight: (UTO)     · Ideal Body Weight: 208 lbs; % Ideal Body Weight 176.9 %   · BMI: 43.6  · BMI Categories: Obese Class 3 (BMI 40.0 or greater)       Nutrition Diagnosis:   · Inadequate oral intake related to cognitive or neurological impairment as evidenced by nutrition support - enteral nutrition    Nutrition Interventions:   Nutrition Education/Counseling:  Education not indicated   Coordination of Nutrition Care:  Continued Inpatient Monitoring, Coordination of Community Care    Goals: Tolerance to EN       Nutrition Monitoring and Evaluation:   Food/Nutrient Intake Outcomes:  Enteral Nutrition Intake/Tolerance  Physical Signs/Symptoms Outcomes:  Biochemical Data, Nutrition Focused Physical Findings, Skin, Weight, GI Status, Fluid Status or Edema, Hemodynamic Status     Discharge Planning:     Too soon to determine     Electronically signed by Daniel Vernon MS, RD, LD on 9/11/20 at 1:44 PM EDT

## 2020-09-11 NOTE — PROGRESS NOTES
Hospitalist Progress Note      PCP: Tom Brenner MD    Date of Admission: 9/8/2020     Hospital course: This is a 61-year-old male with past medical history of polysubstance abuse, NATALI, COPD, OHARA was brought to the emergency department for altered mental status likely secondary to drug overdose; he had taken heroin while on methadone therapy. Patient was intubated for airway protection; started on IV Unasyn for suspected aspiration pneumonia. Status post intubation on 9/11      Subjective: Pt was seen and examined in ICU at bedside. No acute event overnight; he denies any pain or discomfort. No complaint.     Medications:  Reviewed    Infusion Medications    dextrose 5 % and 0.45 % NaCl 100 mL/hr at 09/11/20 0511    propofol 50 mcg/kg/min (09/11/20 0511)    fentaNYL 5 mcg/ml in 0.9%  ml infusion 25 mcg/hr (09/11/20 0546)    dextrose       Scheduled Medications    melatonin ER  2 mg Oral Nightly    QUEtiapine  50 mg Oral BID    ipratropium-albuterol  1 ampule Inhalation Q4H WA    sodium chloride flush  10 mL Intravenous 2 times per day    chlorhexidine  15 mL Mouth/Throat BID    famotidine (PEPCID) injection  20 mg Intravenous BID    folic acid  1 mg Intravenous Daily    thiamine  100 mg Intravenous Daily    ampicillin-sulbactam  3 g Intravenous Q6H    apixaban  5 mg Oral BID    insulin lispro  0-6 Units Subcutaneous TID WC    insulin lispro  0-3 Units Subcutaneous Nightly     PRN Meds: [Held by provider] buprenorphine **AND** cloNIDine, hydrOXYzine, polyethylene glycol, sodium chloride flush, midazolam, albuterol, acetaminophen **OR** acetaminophen, promethazine **OR** ondansetron, glucose, dextrose, glucagon (rDNA), dextrose      Intake/Output Summary (Last 24 hours) at 9/11/2020 0943  Last data filed at 9/11/2020 0740  Gross per 24 hour   Intake 4887 ml   Output 4865 ml   Net 22 ml       Exam:    BP (!) 144/77   Pulse 67   Temp 97.9 °F (36.6 °C) (Esophageal)   Resp 18 Ht 6' 5\" (1.956 m)   Wt (!) 368 lb 9.8 oz (167.2 kg)   SpO2 100%   BMI 43.71 kg/m²     General appearance: Lying comfortably in bed. No apparent distress. Respiratory: Good air entry bilaterally. Cardiovascular: Normal S1/S2. Regular rhythm and rate. Abdomen: Soft, non-tender, non-distended with normal bowel sounds. Musculoskeletal: No clubbing, cyanosis or edema bilaterally. Skin: Skin color, texture, turgor normal.  No rashes or lesions. Neurologic: No focal deficit  Psych: Alert and oriented x3. Mood and affect are appropriate  Peripheral Pulses: +2 palpable, equal bilaterally       Labs:   Recent Labs     09/09/20  0409 09/10/20  0354 09/11/20  0359   WBC 10.2 10.3 10.9   HGB 14.2 13.4 12.9   HCT 44.7 42.1 40.8    215 208     Recent Labs     09/09/20  0409 09/10/20  0354 09/11/20  0359    139 136   K 3.9 3.4* 3.8    104 104   CO2 29 25 22   BUN 14 11 13   CREATININE 1.2 1.2 1.1   CALCIUM 8.7 8.5* 8.7     Recent Labs     09/09/20  0409 09/10/20  0354 09/11/20  0359   AST 20 13 10   ALT 15 13 10   BILITOT 0.7 0.4 0.3   ALKPHOS 58 48 44     No results for input(s): INR in the last 72 hours. Recent Labs     09/08/20  1523   TROPONINI <0.01       Radiology:  XR CHEST PORTABLE   Final Result   Stable abnormal chest with  persistent left perihilar and left basilar   infiltrates which is slightly increased and improved right basilar   atelectasis. CHF and/or pneumonia are considered. Poor visualization of the tip of the nasogastric tube. XR CHEST PORTABLE   Final Result   Improving aeration at the bases, left more so than right. See above. XR CHEST PORTABLE   Final Result   Stable abnormal chest with unchanged bibasilar infiltrates/atelectasis   and pleural effusions which may be due to pneumonia or edema.             XR CHEST PORTABLE   Final Result   Partial clearing of right-sided infiltrates               CT CHEST WO CONTRAST   Final Result      Bibasilar consolidations likely infectious/inflammatory in etiology. Recommend dedicated chest CT in 3 months to demonstrate resolution. CT Head WO Contrast   Final Result   Unremarkable brain. Suspected nasal polyps. XR ABDOMEN FOR NG/OG/NE TUBE PLACEMENT   Final Result   Status post endotracheal and nasogastric intubation as noted. Increasing airspace disease on the right. XR CHEST PORTABLE   Final Result   Status post endotracheal and nasogastric intubation as noted. Increasing airspace disease on the right. XR CHEST PORTABLE   Final Result   Suboptimal inspiration which in this case may mimic congestive   failure. Consider a two-view study. XR CHEST PORTABLE    (Results Pending)   XR CHEST PORTABLE    (Results Pending)             Active Hospital Problems    Diagnosis Date Noted    Acute respiratory distress [R06.03] 09/08/2020       Assessment  Acute respiratory failure with hypoxia, hypercapnia - improved, status post extubation this AM.  Now on 6 L oxygen via nasal cannula  Probable aspiration pneumonia  Drug overdose  Acute toxic encephalopathy - secondary to above, improving  COPD  History of PE  Chronic anticoagulation - on Eliquis for above   NATALI/Probable OHVS  Morbid obesity -  Body mass index is 43.71 kg/m².    GERD  Hypertension  Hyperlipidemia  Polysubstance abuse  Tobacco abuse    Plan:  Discussed with Dr. Page Robins, weaning of fentanyl  Supplemental oxygen as needed; wean FiO2 to keep oxygen saturation above 92%  Continue with Unasyn  Bronchodilators as needed  Continue with Eliquis  On Seroquel    Diet: DIET TUBE FEED CONTINUOUS/CYCLIC NPO; 1.5 Calorie without Fiber; Nasogastric  Code Status: Full Code    PT/OT Eval Status: N/A    Dispo - remains in ICU for still requiring critical care    Samantha Duff MD 9/11/2020 9:43 AM

## 2020-09-11 NOTE — PROGRESS NOTES
RAPID wean initiated due to patient being aggressive and trying to pull ETT.  PS 10 +10 60% O2  Spontaneous Parameters performed    VT = 1300 ml  f = 18  B/M  Ve = 8.9 L/M  NIF = -30  cmH2O  VC = 4081 L  RSBI = 127      Performed by Francyne School

## 2020-09-11 NOTE — PROGRESS NOTES
Patient removed only IV site that was placed today. Multiple seasoned RNs attempted to place new IV site, unsuccessful. Patient was strongly resistant to last attempt, will not allow RNs to place another.

## 2020-09-11 NOTE — PROCEDURES
Arterial line placement    Procedure: Left/Right radial arterial line placement. Indications: Continuous monitoring of blood pressure in a patient with hypotension +/- shock, on Levophed. Anesthesia: Local infiltration of 1% lidocaine. Consent: The family members were counseled regarding the procedure, its indications, risks, potential complications and alternatives, and any questions were answered. Consent was obtained to proceed. Technique: Time Out: Immediately prior to the procedure a \"timeout\" was called to verify the correct patient and procedure. Procedure was done using strict aseptic technique. left radial site was cleaned with chloraprep and draped. Radial artery was identified, then Lidocaine 1% was infiltrated locally. Radial arterial line was inserted, a good blood flow was obtained, after which guidewire was inserted all the way with no resistance. Then the canula was inserted and needle with guidewire was withdrawn. Pulsatile bright red blood flow was observed. The canula was connected to BP monitoring apparatus and a good quality waveform was noted. Then the canula was secured with 2 stay sutures of 3-0 silk after Lidocaine infiltration, following which dressing was applied. Number of sticks: 2. Number of Kits used: 1. Complications: No immediate complication. Estimated blood loss: About 1 ml. Comment: Patient tolerated the procedure well.      Doss Merlin, MD PGY-2  9/11/2020 9:08 AM

## 2020-09-11 NOTE — PLAN OF CARE
Problem: Restraint Use - Nonviolent/Non-Self-Destructive Behavior:  Goal: Absence of restraint-related injury  Description: Absence of restraint-related injury  9/11/2020 0013 by Bernie Valenzuela RN  Outcome: Met This Shift     Problem: Restraint Use - Nonviolent/Non-Self-Destructive Behavior:  Goal: Absence of restraint indications  Description: Absence of restraint indications  9/11/2020 0013 by Bernie Valenzuela RN  Outcome: Not Met This Shift

## 2020-09-11 NOTE — PLAN OF CARE
Problem: Restraint Use - Nonviolent/Non-Self-Destructive Behavior:  Goal: Absence of restraint-related injury  Description: Absence of restraint-related injury  9/10/2020 2047 by Juan Balderas RN  Outcome: Met This Shift     Problem: Restraint Use - Nonviolent/Non-Self-Destructive Behavior:  Goal: Absence of restraint indications  Description: Absence of restraint indications  9/10/2020 2047 by Juan Balderas RN  Outcome: Not Met This Shift

## 2020-09-12 LAB
ALBUMIN SERPL-MCNC: 3.4 G/DL (ref 3.5–5.2)
ALP BLD-CCNC: 53 U/L (ref 40–129)
ALT SERPL-CCNC: 20 U/L (ref 0–40)
ANION GAP SERPL CALCULATED.3IONS-SCNC: 18 MMOL/L (ref 7–16)
AST SERPL-CCNC: 24 U/L (ref 0–39)
BASOPHILS ABSOLUTE: 0.03 E9/L (ref 0–0.2)
BASOPHILS RELATIVE PERCENT: 0.3 % (ref 0–2)
BILIRUB SERPL-MCNC: 1.3 MG/DL (ref 0–1.2)
BUN BLDV-MCNC: 16 MG/DL (ref 6–20)
CALCIUM SERPL-MCNC: 9.1 MG/DL (ref 8.6–10.2)
CHLORIDE BLD-SCNC: 110 MMOL/L (ref 98–107)
CO2: 18 MMOL/L (ref 22–29)
CREAT SERPL-MCNC: 1 MG/DL (ref 0.7–1.2)
EOSINOPHILS ABSOLUTE: 0.02 E9/L (ref 0.05–0.5)
EOSINOPHILS RELATIVE PERCENT: 0.2 % (ref 0–6)
GFR AFRICAN AMERICAN: >60
GFR NON-AFRICAN AMERICAN: >60 ML/MIN/1.73
GLUCOSE BLD-MCNC: 92 MG/DL (ref 74–99)
HCT VFR BLD CALC: 42.9 % (ref 37–54)
HEMOGLOBIN: 13.8 G/DL (ref 12.5–16.5)
IMMATURE GRANULOCYTES #: 0.04 E9/L
IMMATURE GRANULOCYTES %: 0.4 % (ref 0–5)
LYMPHOCYTES ABSOLUTE: 1.44 E9/L (ref 1.5–4)
LYMPHOCYTES RELATIVE PERCENT: 14.6 % (ref 20–42)
MCH RBC QN AUTO: 27.1 PG (ref 26–35)
MCHC RBC AUTO-ENTMCNC: 32.2 % (ref 32–34.5)
MCV RBC AUTO: 84.1 FL (ref 80–99.9)
METER GLUCOSE: 141 MG/DL (ref 74–99)
METER GLUCOSE: 82 MG/DL (ref 74–99)
METER GLUCOSE: 86 MG/DL (ref 74–99)
METER GLUCOSE: 87 MG/DL (ref 74–99)
MONOCYTES ABSOLUTE: 0.57 E9/L (ref 0.1–0.95)
MONOCYTES RELATIVE PERCENT: 5.8 % (ref 2–12)
NEUTROPHILS ABSOLUTE: 7.78 E9/L (ref 1.8–7.3)
NEUTROPHILS RELATIVE PERCENT: 78.7 % (ref 43–80)
PDW BLD-RTO: 15.7 FL (ref 11.5–15)
PLATELET # BLD: 258 E9/L (ref 130–450)
PMV BLD AUTO: 8.9 FL (ref 7–12)
POTASSIUM SERPL-SCNC: 3.3 MMOL/L (ref 3.5–5)
RBC # BLD: 5.1 E12/L (ref 3.8–5.8)
SODIUM BLD-SCNC: 146 MMOL/L (ref 132–146)
TOTAL PROTEIN: 7.2 G/DL (ref 6.4–8.3)
WBC # BLD: 9.9 E9/L (ref 4.5–11.5)

## 2020-09-12 PROCEDURE — 94640 AIRWAY INHALATION TREATMENT: CPT

## 2020-09-12 PROCEDURE — 6370000000 HC RX 637 (ALT 250 FOR IP): Performed by: INTERNAL MEDICINE

## 2020-09-12 PROCEDURE — 82962 GLUCOSE BLOOD TEST: CPT

## 2020-09-12 PROCEDURE — 85025 COMPLETE CBC W/AUTO DIFF WBC: CPT

## 2020-09-12 PROCEDURE — 1200000000 HC SEMI PRIVATE

## 2020-09-12 PROCEDURE — 80053 COMPREHEN METABOLIC PANEL: CPT

## 2020-09-12 PROCEDURE — 36415 COLL VENOUS BLD VENIPUNCTURE: CPT

## 2020-09-12 RX ORDER — FOLIC ACID 1 MG/1
1 TABLET ORAL DAILY
Status: DISCONTINUED | OUTPATIENT
Start: 2020-09-12 | End: 2020-09-13 | Stop reason: HOSPADM

## 2020-09-12 RX ORDER — FAMOTIDINE 20 MG/1
20 TABLET, FILM COATED ORAL 2 TIMES DAILY
Status: DISCONTINUED | OUTPATIENT
Start: 2020-09-12 | End: 2020-09-13 | Stop reason: HOSPADM

## 2020-09-12 RX ORDER — THIAMINE MONONITRATE (VIT B1) 100 MG
100 TABLET ORAL DAILY
Status: DISCONTINUED | OUTPATIENT
Start: 2020-09-12 | End: 2020-09-13 | Stop reason: HOSPADM

## 2020-09-12 RX ADMIN — ACETAMINOPHEN 650 MG: 325 TABLET, FILM COATED ORAL at 09:10

## 2020-09-12 RX ADMIN — IPRATROPIUM BROMIDE AND ALBUTEROL SULFATE 1 AMPULE: .5; 3 SOLUTION RESPIRATORY (INHALATION) at 13:45

## 2020-09-12 RX ADMIN — APIXABAN 5 MG: 5 TABLET, FILM COATED ORAL at 20:12

## 2020-09-12 RX ADMIN — ACETAMINOPHEN 650 MG: 325 TABLET, FILM COATED ORAL at 20:12

## 2020-09-12 RX ADMIN — AMOXICILLIN AND CLAVULANATE POTASSIUM 1 TABLET: 875; 125 TABLET, FILM COATED ORAL at 20:12

## 2020-09-12 RX ADMIN — Medication 100 MG: at 20:11

## 2020-09-12 RX ADMIN — FOLIC ACID 1 MG: 1 TABLET ORAL at 20:12

## 2020-09-12 RX ADMIN — IPRATROPIUM BROMIDE AND ALBUTEROL SULFATE 1 AMPULE: .5; 3 SOLUTION RESPIRATORY (INHALATION) at 17:16

## 2020-09-12 RX ADMIN — FAMOTIDINE 20 MG: 20 TABLET ORAL at 20:12

## 2020-09-12 RX ADMIN — Medication 2 MG: at 20:11

## 2020-09-12 RX ADMIN — QUETIAPINE FUMARATE 50 MG: 25 TABLET ORAL at 20:11

## 2020-09-12 RX ADMIN — LORAZEPAM 1 MG: 1 TABLET ORAL at 02:25

## 2020-09-12 RX ADMIN — APIXABAN 5 MG: 5 TABLET, FILM COATED ORAL at 09:07

## 2020-09-12 RX ADMIN — ACETAMINOPHEN 650 MG: 325 TABLET, FILM COATED ORAL at 02:25

## 2020-09-12 RX ADMIN — IPRATROPIUM BROMIDE AND ALBUTEROL SULFATE 1 AMPULE: .5; 3 SOLUTION RESPIRATORY (INHALATION) at 07:57

## 2020-09-12 RX ADMIN — AMOXICILLIN AND CLAVULANATE POTASSIUM 1 TABLET: 875; 125 TABLET, FILM COATED ORAL at 09:07

## 2020-09-12 RX ADMIN — QUETIAPINE FUMARATE 50 MG: 25 TABLET ORAL at 09:07

## 2020-09-12 ASSESSMENT — PAIN DESCRIPTION - FREQUENCY
FREQUENCY: INTERMITTENT

## 2020-09-12 ASSESSMENT — PAIN - FUNCTIONAL ASSESSMENT
PAIN_FUNCTIONAL_ASSESSMENT: PREVENTS OR INTERFERES SOME ACTIVE ACTIVITIES AND ADLS
PAIN_FUNCTIONAL_ASSESSMENT: ACTIVITIES ARE NOT PREVENTED
PAIN_FUNCTIONAL_ASSESSMENT: PREVENTS OR INTERFERES SOME ACTIVE ACTIVITIES AND ADLS

## 2020-09-12 ASSESSMENT — PAIN DESCRIPTION - PAIN TYPE
TYPE: ACUTE PAIN

## 2020-09-12 ASSESSMENT — PAIN DESCRIPTION - ORIENTATION
ORIENTATION: RIGHT;LEFT

## 2020-09-12 ASSESSMENT — PAIN SCALES - GENERAL
PAINLEVEL_OUTOF10: 3
PAINLEVEL_OUTOF10: 8
PAINLEVEL_OUTOF10: 0
PAINLEVEL_OUTOF10: 0
PAINLEVEL_OUTOF10: 6
PAINLEVEL_OUTOF10: 0

## 2020-09-12 ASSESSMENT — PAIN DESCRIPTION - LOCATION
LOCATION: HEAD

## 2020-09-12 ASSESSMENT — PAIN DESCRIPTION - PROGRESSION
CLINICAL_PROGRESSION: NOT CHANGED

## 2020-09-12 ASSESSMENT — PAIN DESCRIPTION - ONSET
ONSET: GRADUAL
ONSET: AWAKENED FROM SLEEP
ONSET: GRADUAL

## 2020-09-12 ASSESSMENT — PAIN DESCRIPTION - DESCRIPTORS
DESCRIPTORS: HEADACHE

## 2020-09-12 NOTE — PLAN OF CARE
Problem: Skin Integrity:  Goal: Will show no infection signs and symptoms  Description: Will show no infection signs and symptoms  9/11/2020 2326 by Yany Lee RN  Outcome: Met This Shift  9/11/2020 1226 by Vira Thomason RN  Outcome: Met This Shift  Goal: Absence of new skin breakdown  Description: Absence of new skin breakdown  9/11/2020 2326 by Yany Lee RN  Outcome: Met This Shift  9/11/2020 1226 by Vira Thomason RN  Outcome: Met This Shift     Problem: Falls - Risk of:  Goal: Will remain free from falls  Description: Will remain free from falls  9/11/2020 2326 by Yany Lee RN  Outcome: Met This Shift  9/11/2020 1226 by Vira Thomason RN  Outcome: Met This Shift  Goal: Absence of physical injury  Description: Absence of physical injury  9/11/2020 2326 by Yany Lee RN  Outcome: Met This Shift  9/11/2020 1226 by Vira Thomason RN  Outcome: Met This Shift

## 2020-09-12 NOTE — PROGRESS NOTES
Hospitalist Progress Note      PCP: Pablo Bal MD    Date of Admission: 9/8/2020      Hospital Course: Patient is a 50years old man with confusion admitted with heroin overdose patient has underlying history of multiple substance abuse, obstructive sleep apnea COPD Saritha Demarco and obesity. The patient was taking heroin while on methadone therapy, patient was intubated was placed on IV Unasyn for treatment of aspiration pneumonia. Patient is currently extubated and on a general floor, he continues to complain of some congestion and is on Augmentin. Patient refusing to have any IV in him and all meds switched to p.o. Patient wants to be discharged, he remains confused he cannot remember the phone number of his Sister Shaunna Mays that lives with him at home. Subjective: Patient seen and examined on 5416 B, he denies any pain complains of some chest congestion and states amoxicillin is helping.     Medications:  Reviewed    Infusion Medications    dextrose 5 % and 0.45 % NaCl 100 mL/hr at 09/11/20 0511    dextrose       Scheduled Medications    sodium chloride flush  10 mL Intravenous 2 times per day    amoxicillin-clavulanate  1 tablet Oral 2 times per day    melatonin ER  2 mg Oral Nightly    QUEtiapine  50 mg Oral BID    ipratropium-albuterol  1 ampule Inhalation Q4H WA    famotidine (PEPCID) injection  20 mg Intravenous BID    folic acid  1 mg Intravenous Daily    thiamine  100 mg Intravenous Daily    apixaban  5 mg Oral BID    insulin lispro  0-6 Units Subcutaneous TID WC    insulin lispro  0-3 Units Subcutaneous Nightly     PRN Meds: sodium chloride flush, LORazepam **OR** LORazepam **OR** LORazepam **OR** LORazepam **OR** LORazepam **OR** LORazepam **OR** LORazepam **OR** LORazepam, buprenorphine **AND** cloNIDine, hydrOXYzine, polyethylene glycol, midazolam, albuterol, acetaminophen **OR** acetaminophen, promethazine **OR** ondansetron, glucose, dextrose, glucagon (rDNA), overdose  Multiple substance abuse  Obstructive sleep apnea    Medications changed to p.o.   Need to monitor for DT      DVT Prophylaxis: Patient is on apixaban 5 mg twice daily  Diet: DIET GENERAL;  Code Status: Full Code    PT/OT Eval Status: Ordered    Ponce Rodríguez MD

## 2020-09-13 ENCOUNTER — APPOINTMENT (OUTPATIENT)
Dept: GENERAL RADIOLOGY | Age: 55
DRG: 816 | End: 2020-09-13
Payer: MEDICAID

## 2020-09-13 VITALS
WEIGHT: 315 LBS | HEART RATE: 68 BPM | SYSTOLIC BLOOD PRESSURE: 168 MMHG | HEIGHT: 77 IN | OXYGEN SATURATION: 93 % | DIASTOLIC BLOOD PRESSURE: 89 MMHG | RESPIRATION RATE: 18 BRPM | BODY MASS INDEX: 37.19 KG/M2 | TEMPERATURE: 97.1 F

## 2020-09-13 LAB
ALBUMIN SERPL-MCNC: 3.5 G/DL (ref 3.5–5.2)
ALP BLD-CCNC: 54 U/L (ref 40–129)
ALT SERPL-CCNC: 21 U/L (ref 0–40)
ANION GAP SERPL CALCULATED.3IONS-SCNC: 13 MMOL/L (ref 7–16)
AST SERPL-CCNC: 22 U/L (ref 0–39)
BASOPHILS ABSOLUTE: 0.04 E9/L (ref 0–0.2)
BASOPHILS RELATIVE PERCENT: 0.4 % (ref 0–2)
BILIRUB SERPL-MCNC: 1.4 MG/DL (ref 0–1.2)
BUN BLDV-MCNC: 17 MG/DL (ref 6–20)
CALCIUM SERPL-MCNC: 9.2 MG/DL (ref 8.6–10.2)
CHLORIDE BLD-SCNC: 108 MMOL/L (ref 98–107)
CO2: 19 MMOL/L (ref 22–29)
CREAT SERPL-MCNC: 0.9 MG/DL (ref 0.7–1.2)
EOSINOPHILS ABSOLUTE: 0.05 E9/L (ref 0.05–0.5)
EOSINOPHILS RELATIVE PERCENT: 0.5 % (ref 0–6)
GFR AFRICAN AMERICAN: >60
GFR NON-AFRICAN AMERICAN: >60 ML/MIN/1.73
GLUCOSE BLD-MCNC: 86 MG/DL (ref 74–99)
HCT VFR BLD CALC: 44.7 % (ref 37–54)
HEMOGLOBIN: 14.6 G/DL (ref 12.5–16.5)
IMMATURE GRANULOCYTES #: 0.04 E9/L
IMMATURE GRANULOCYTES %: 0.4 % (ref 0–5)
LYMPHOCYTES ABSOLUTE: 1.63 E9/L (ref 1.5–4)
LYMPHOCYTES RELATIVE PERCENT: 17.2 % (ref 20–42)
MCH RBC QN AUTO: 26.8 PG (ref 26–35)
MCHC RBC AUTO-ENTMCNC: 32.7 % (ref 32–34.5)
MCV RBC AUTO: 82 FL (ref 80–99.9)
METER GLUCOSE: 81 MG/DL (ref 74–99)
METER GLUCOSE: 97 MG/DL (ref 74–99)
MONOCYTES ABSOLUTE: 0.61 E9/L (ref 0.1–0.95)
MONOCYTES RELATIVE PERCENT: 6.4 % (ref 2–12)
NEUTROPHILS ABSOLUTE: 7.12 E9/L (ref 1.8–7.3)
NEUTROPHILS RELATIVE PERCENT: 75.1 % (ref 43–80)
PDW BLD-RTO: 15.4 FL (ref 11.5–15)
PLATELET # BLD: 284 E9/L (ref 130–450)
PMV BLD AUTO: 9 FL (ref 7–12)
POTASSIUM SERPL-SCNC: 3.4 MMOL/L (ref 3.5–5)
RBC # BLD: 5.45 E12/L (ref 3.8–5.8)
SODIUM BLD-SCNC: 140 MMOL/L (ref 132–146)
TOTAL PROTEIN: 7.4 G/DL (ref 6.4–8.3)
WBC # BLD: 9.5 E9/L (ref 4.5–11.5)

## 2020-09-13 PROCEDURE — 6370000000 HC RX 637 (ALT 250 FOR IP): Performed by: INTERNAL MEDICINE

## 2020-09-13 PROCEDURE — 94640 AIRWAY INHALATION TREATMENT: CPT

## 2020-09-13 PROCEDURE — 85025 COMPLETE CBC W/AUTO DIFF WBC: CPT

## 2020-09-13 PROCEDURE — 36415 COLL VENOUS BLD VENIPUNCTURE: CPT

## 2020-09-13 PROCEDURE — 80053 COMPREHEN METABOLIC PANEL: CPT

## 2020-09-13 PROCEDURE — 71046 X-RAY EXAM CHEST 2 VIEWS: CPT

## 2020-09-13 PROCEDURE — 82962 GLUCOSE BLOOD TEST: CPT

## 2020-09-13 RX ORDER — AMOXICILLIN AND CLAVULANATE POTASSIUM 875; 125 MG/1; MG/1
1 TABLET, FILM COATED ORAL EVERY 12 HOURS SCHEDULED
Qty: 20 TABLET | Refills: 0 | Status: SHIPPED | OUTPATIENT
Start: 2020-09-13 | End: 2020-09-23

## 2020-09-13 RX ORDER — POTASSIUM CHLORIDE 20 MEQ/1
20 TABLET, EXTENDED RELEASE ORAL 2 TIMES DAILY WITH MEALS
Status: DISCONTINUED | OUTPATIENT
Start: 2020-09-13 | End: 2020-09-13 | Stop reason: HOSPADM

## 2020-09-13 RX ORDER — FAMOTIDINE 20 MG/1
20 TABLET, FILM COATED ORAL 2 TIMES DAILY
Qty: 60 TABLET | Refills: 3 | Status: SHIPPED | OUTPATIENT
Start: 2020-09-13 | End: 2021-04-30

## 2020-09-13 RX ORDER — FOLIC ACID 1 MG/1
1 TABLET ORAL DAILY
Qty: 30 TABLET | Refills: 3 | Status: SHIPPED | OUTPATIENT
Start: 2020-09-14 | End: 2021-04-30

## 2020-09-13 RX ORDER — POTASSIUM CHLORIDE 20 MEQ/1
20 TABLET, EXTENDED RELEASE ORAL 2 TIMES DAILY WITH MEALS
Qty: 60 TABLET | Refills: 3 | Status: SHIPPED | OUTPATIENT
Start: 2020-09-13 | End: 2021-04-30

## 2020-09-13 RX ORDER — LANOLIN ALCOHOL/MO/W.PET/CERES
100 CREAM (GRAM) TOPICAL DAILY
Qty: 30 TABLET | Refills: 3 | Status: SHIPPED | OUTPATIENT
Start: 2020-09-14 | End: 2022-08-23

## 2020-09-13 RX ADMIN — ACETAMINOPHEN 650 MG: 325 TABLET, FILM COATED ORAL at 09:02

## 2020-09-13 RX ADMIN — FAMOTIDINE 20 MG: 20 TABLET ORAL at 09:02

## 2020-09-13 RX ADMIN — IPRATROPIUM BROMIDE AND ALBUTEROL SULFATE 1 AMPULE: .5; 3 SOLUTION RESPIRATORY (INHALATION) at 15:45

## 2020-09-13 RX ADMIN — QUETIAPINE FUMARATE 50 MG: 25 TABLET ORAL at 09:02

## 2020-09-13 RX ADMIN — APIXABAN 5 MG: 5 TABLET, FILM COATED ORAL at 09:03

## 2020-09-13 RX ADMIN — FOLIC ACID 1 MG: 1 TABLET ORAL at 09:02

## 2020-09-13 RX ADMIN — ACETAMINOPHEN 650 MG: 325 TABLET, FILM COATED ORAL at 02:59

## 2020-09-13 RX ADMIN — Medication 100 MG: at 09:03

## 2020-09-13 RX ADMIN — AMOXICILLIN AND CLAVULANATE POTASSIUM 1 TABLET: 875; 125 TABLET, FILM COATED ORAL at 09:02

## 2020-09-13 RX ADMIN — POTASSIUM CHLORIDE 20 MEQ: 20 TABLET, EXTENDED RELEASE ORAL at 11:10

## 2020-09-13 ASSESSMENT — PAIN SCALES - GENERAL
PAINLEVEL_OUTOF10: 3
PAINLEVEL_OUTOF10: 0
PAINLEVEL_OUTOF10: 7
PAINLEVEL_OUTOF10: 0
PAINLEVEL_OUTOF10: 0

## 2020-09-13 ASSESSMENT — PAIN DESCRIPTION - ONSET
ONSET: SUDDEN
ONSET: AWAKENED FROM SLEEP

## 2020-09-13 ASSESSMENT — PAIN - FUNCTIONAL ASSESSMENT
PAIN_FUNCTIONAL_ASSESSMENT: PREVENTS OR INTERFERES SOME ACTIVE ACTIVITIES AND ADLS
PAIN_FUNCTIONAL_ASSESSMENT: PREVENTS OR INTERFERES SOME ACTIVE ACTIVITIES AND ADLS

## 2020-09-13 ASSESSMENT — PAIN DESCRIPTION - PAIN TYPE
TYPE: ACUTE PAIN
TYPE: ACUTE PAIN

## 2020-09-13 ASSESSMENT — PAIN DESCRIPTION - ORIENTATION
ORIENTATION: RIGHT;LEFT
ORIENTATION: RIGHT;LEFT

## 2020-09-13 ASSESSMENT — PAIN DESCRIPTION - FREQUENCY
FREQUENCY: INTERMITTENT
FREQUENCY: INTERMITTENT

## 2020-09-13 ASSESSMENT — PAIN DESCRIPTION - DESCRIPTORS
DESCRIPTORS: HEADACHE
DESCRIPTORS: HEADACHE

## 2020-09-13 ASSESSMENT — PAIN DESCRIPTION - LOCATION
LOCATION: HEAD
LOCATION: HEAD

## 2020-09-13 ASSESSMENT — PAIN DESCRIPTION - PROGRESSION
CLINICAL_PROGRESSION: NOT CHANGED
CLINICAL_PROGRESSION: NOT CHANGED

## 2020-09-13 NOTE — PROGRESS NOTES
Discharge instructions given to patient, all questions answered,  Information/paperwork also given for [facility] and [facility]. Patient being transported via private car.

## 2020-09-13 NOTE — PROGRESS NOTES
Patient upset, stating he wants to leave, notified Dr. Axel Butler, per their conversation, patient was agreeable to inpatient drug therapy but would have to stay another day. He stated that he have his own  and will \"figure things out on his own. \"  He stated he's leaving AMA and bring the paperwork because he's not staying another day. He have a family friend that is here to transport him in a private car.

## 2020-09-13 NOTE — DISCHARGE SUMMARY
Hospital Medicine Discharge Summary    Patient ID: Deepak Jamila      Patient's PCP: Papa Rainey MD    Admit Date: 9/8/2020     Discharge Date:   September 13, 2020    Admitting Physician: Annabelle Tavares MD     Discharge Physician: Silvana Junior MD     Discharge Diagnoses: Active Hospital Problems    Diagnosis Date Noted    Acute respiratory distress [R06.03] 09/08/2020   Overdose with heroin and multiple substance abuse while on methadone. Aspiration pneumonia  Hypertension  Obesity    The patient was seen and examined on day of discharge and this discharge summary is in conjunction with any daily progress note from day of discharge. Hospital Course: Patient was admitted with heroin overdose and multiple substance abuse while on methadone, he desaturated and required intubation. Patient was successfully extubated. He was placed on the [place] scale  Psych consult requested and placed, patient refused to wait and signed himself AMA  There were no symptoms of DVT, patient last time required 1 mg of Ativan was over 20 hours ago prior to discharge, he is advised to call for inpatient rehab, he will contact [facility]      Exam:     BP (!) 168/89   Pulse 68   Temp 97.1 °F (36.2 °C) (Temporal)   Resp 18   Ht 6' 5\" (1.956 m)   Wt (!) 368 lb 9.8 oz (167.2 kg)   SpO2 93%   BMI 43.71 kg/m²     General appearance: No apparent distress, appears stated age and cooperative. HEENT: Pupils equal, round, and reactive to light. Conjunctivae/corneas clear. Neck: Supple, with full range of motion. No jugular venous distention. Trachea midline. Respiratory:  Normal respiratory effort. Clear to auscultation, bilaterally without Rales/Wheezes/Rhonchi. Cardiovascular: Regular rate and rhythm with normal S1/S2 without murmurs, rubs or gallops. Abdomen: Soft, non-tender, non-distended with normal bowel sounds.   Musculoskeletal: No clubbing, cyanosis or edema bilaterally. Full range of motion without deformity. Skin: Skin color, texture, turgor normal.  No rashes or lesions. Neurologic:  Neurovascularly intact without any focal sensory/motor deficits. Cranial nerves: II-XII intact, grossly non-focal.  Psychiatric: Alert and oriented, thought content appropriate, normal insight      Consults:     IP CONSULT TO CRITICAL CARE  IP CONSULT TO INTERNAL MEDICINE  IP CONSULT TO DIETITIAN  IP CONSULT TO CRITICAL CARE  IP CONSULT TO SOCIAL WORK  IP CONSULT TO SOCIAL WORK    Significant Diagnostic Studies: CT of chest and CT of the head    Disposition: Home  Discharge Instructions/Follow-up: Patient to follow-up with his PCP in 3 to 5 days also to follow-up with [facility]  , He has his case management, advised to call her Monday. Code Status:  Full Code   Activity: activity as tolerated    Diet: cardiac diet    Labs: For convenience and continuity at follow-up the following most recent labs are provided:      CBC:    Lab Results   Component Value Date    WBC 9.5 09/13/2020    HGB 14.6 09/13/2020    HCT 44.7 09/13/2020     09/13/2020       Renal:    Lab Results   Component Value Date     09/13/2020    K 3.4 09/13/2020    K 3.7 09/08/2020     09/13/2020    CO2 19 09/13/2020    BUN 17 09/13/2020    CREATININE 0.9 09/13/2020    CALCIUM 9.2 09/13/2020    PHOS 4.8 08/03/2011     Condition at discharge: Stable.   Discharge Medications:     Current Discharge Medication List           Details   furosemide (LASIX) 20 MG tablet Take 1 tablet by mouth daily for 5 days  Qty: 5 tablet, Refills: 0      naproxen (NAPROSYN) 500 MG tablet Take 1 tablet by mouth 2 times daily for 7 days  Qty: 14 tablet, Refills: 0      apixaban (ELIQUIS) 5 MG TABS tablet Take 1 tablet by mouth 2 times daily Please take 10 mg (2 tablets) twice a day for 7 days then switch to take 5 mg (1 tablet) a day for the rest of the course  Qty: 74 tablet, Refills: 0      BREO ELLIPTA 100-25 MCG/INH AEPB inhaler inhale 1 puff by mouth once daily AT THE SAME TIME EACH DAY  Refills: 0      fluticasone (FLONASE) 50 MCG/ACT nasal spray Refills: 0      busPIRone (BUSPAR) 15 MG tablet take 1 tablet by mouth twice a day  Refills: 0      VENTOLIN  (90 Base) MCG/ACT inhaler Refills: 0      ARIPiprazole (ABILIFY) 2 MG tablet take 1 tablet by mouth at bedtime  Refills: 0      lisinopril-hydrochlorothiazide (PRINZIDE;ZESTORETIC) 20-12.5 MG per tablet Take 1 tablet by mouth daily  Qty: 30 tablet, Refills: 3    Associated Diagnoses: Essential hypertension      ipratropium (ATROVENT HFA) 17 MCG/ACT inhaler Inhale 2 puffs into the lungs every 6 hours  Qty: 1 Inhaler, Refills: 3    Associated Diagnoses: Chronic bronchitis, unspecified chronic bronchitis type (HCC)      mometasone-formoterol (DULERA) 100-5 MCG/ACT inhaler Inhale 2 puffs into the lungs 2 times daily  Qty: 1 Inhaler, Refills: 3    Associated Diagnoses: Chronic bronchitis, unspecified chronic bronchitis type (HCC)      albuterol (PROVENTIL) (5 MG/ML) 0.5% nebulizer solution Take 0.5 mLs by nebulization every 6 hours as needed for Wheezing  Qty: 120 each, Refills: 0      budesonide (PULMICORT) 0.5 MG/2ML nebulizer suspension Take 2 mLs by nebulization 2 times daily  Qty: 60 ampule, Refills: 3      ipratropium (ATROVENT) 0.02 % nebulizer solution Take 2.5 mLs by nebulization every 6 hours  Qty: 2.5 mL, Refills: 3      cetirizine (ZYRTEC) 10 MG tablet Take 10 mg by mouth daily      gabapentin (NEURONTIN) 300 MG capsule Take 600 mg by mouth nightly              Time Spent on discharge is more than 30 minutes in the examination, evaluation, counseling and review of medications and discharge plan. Signed:    Christiana Coburn MD   9/13/2020      Thank you Shannon Castillo MD for the opportunity to be involved in this patient's care. If you have any questions or concerns please feel free to contact me at [phone].

## 2020-09-13 NOTE — FLOWSHEET NOTE
Pt provided contact info on [facility] services, erin rueda, and [facility]. Pt stated he has a  that will help him get into rehab, and just kept saying please get me out of here. He said he will call him tomorrow and get help, but he really needs to go.

## 2020-09-13 NOTE — PROGRESS NOTES
Hospitalist Progress Note      PCP: Kulwant Self MD    Date of Admission: 2020      Hospital Course: Patient is a 50years old man with confusion admitted with heroin overdose patient has underlying history of multiple substance abuse, obstructive sleep apnea COPD Charlanne Freida and obesity. The patient was taking heroin while on methadone therapy, patient was intubated was placed on IV Unasyn for treatment of aspiration pneumonia. Patient is currently extubated and on a general floor, he continues to complain of some congestion and is on Augmentin. Patient refusing to have any IV in him and all meds switched to p.o. Patient wants to be discharged, he remains confused he cannot remember the phone number of his Sister Светлана Carnes that lives with him at home. Subjective: Patient seen and examined on 5416 B, he denies any pain complains of some chest congestion and states amoxicillin is helping. Patient congestion improving, requesting to go home, counseled regarding inpatient rehabilitation and he is open to the idea. He states he will discuss with his  at [facility].     Medications:  Reviewed    Infusion Medications    dextrose       Scheduled Medications    potassium chloride  20 mEq Oral BID WC    famotidine  20 mg Oral BID    thiamine  100 mg Oral Daily    folic acid  1 mg Oral Daily    sodium chloride flush  10 mL Intravenous 2 times per day    amoxicillin-clavulanate  1 tablet Oral 2 times per day    melatonin ER  2 mg Oral Nightly    QUEtiapine  50 mg Oral BID    ipratropium-albuterol  1 ampule Inhalation Q4H WA    apixaban  5 mg Oral BID    insulin lispro  0-6 Units Subcutaneous TID WC    insulin lispro  0-3 Units Subcutaneous Nightly     PRN Meds: sodium chloride flush, LORazepam **OR** LORazepam **OR** LORazepam **OR** LORazepam **OR** LORazepam **OR** LORazepam **OR** LORazepam **OR** LORazepam, hydrOXYzine, [] buprenorphine **AND** cloNIDine, polyethylene glycol, midazolam, albuterol, acetaminophen **OR** acetaminophen, promethazine **OR** ondansetron, glucose, dextrose, glucagon (rDNA), dextrose      Intake/Output Summary (Last 24 hours) at 9/13/2020 1133  Last data filed at 9/13/2020 1046  Gross per 24 hour   Intake 720 ml   Output 600 ml   Net 120 ml       Exam:    BP (!) 168/89   Pulse 68   Temp 97.1 °F (36.2 °C) (Temporal)   Resp 18   Ht 6' 5\" (1.956 m)   Wt (!) 368 lb 9.8 oz (167.2 kg)   SpO2 93%   BMI 43.71 kg/m²     General appearance: No apparent distress, appears stated age . HEENT: Pupils equal, round, and reactive to light. Conjunctivae/corneas clear. Neck: Supple, with full range of motion. No jugular venous distention. Trachea midline. Respiratory:  Normal respiratory effort. Clear to auscultation, bilaterally without Rales/Wheezes/Rhonchi. Cardiovascular: Regular rate and rhythm with normal S1/S2 without murmurs, rubs or gallops. Abdomen: Soft, non-tender, non-distended with normal bowel sounds. Musculoskeletal: No clubbing, cyanosis or edema bilaterally. Full range of motion without deformity. Skin: Skin color, texture, turgor normal.  No rashes or lesions. Neurologic:  Neurovascularly intact without any focal sensory/motor deficits. Cranial nerves: II-XII intact, grossly non-focal.  Psychiatric: Alert and oriented to place and person. Labs:   Recent Labs     09/11/20 0359 09/12/20 0558 09/13/20  0655   WBC 10.9 9.9 9.5   HGB 12.9 13.8 14.6   HCT 40.8 42.9 44.7    258 284     Recent Labs     09/11/20 0359 09/12/20 0558 09/13/20  0655    146 140   K 3.8 3.3* 3.4*    110* 108*   CO2 22 18* 19*   BUN 13 16 17   CREATININE 1.1 1.0 0.9   CALCIUM 8.7 9.1 9.2     Recent Labs     09/11/20 0359 09/12/20  0558 09/13/20  0655   AST 10 24 22   ALT 10 20 21   BILITOT 0.3 1.3* 1.4*   ALKPHOS 44 53 54     No results for input(s): INR in the last 72 hours.   No results for input(s): Radha Saleh in the last 72 hours.    Assessment/Plan:    Active Hospital Problems    Diagnosis Date Noted    Acute respiratory distress [R06.03] 09/08/2020   Heroin overdose, patient denies any suicidal ideations or attempts, states that his overdose was due to mixing drugs for recreation. Multiple substance abuse  Obstructive sleep apnea  Hypokalemia  Confusion/Encephalopathy  Tobacco  Alcohol abuse. Medications changed to p.o. Need to monitor for DT  PT> agreed for referral for inpatient rehabilitation facility if he is accepted( pt. [facility]. Nicoderm patch   psych consult ordered.     DVT Prophylaxis: Patient is on apixaban 5 mg twice daily  Diet: DIET GENERAL;  Code Status: Full Code    PT/OT Eval Status: Ordered    Joanne Ortiz MD

## 2020-09-14 LAB
BLOOD CULTURE, ROUTINE: NORMAL
CULTURE, BLOOD 2: NORMAL

## 2021-04-30 ENCOUNTER — APPOINTMENT (OUTPATIENT)
Dept: GENERAL RADIOLOGY | Age: 56
End: 2021-04-30
Payer: MEDICAID

## 2021-04-30 ENCOUNTER — HOSPITAL ENCOUNTER (EMERGENCY)
Age: 56
Discharge: ANOTHER HOSPITAL | End: 2021-05-01
Attending: EMERGENCY MEDICINE
Payer: MEDICAID

## 2021-04-30 DIAGNOSIS — R06.89 DYSPNEA AND RESPIRATORY ABNORMALITIES: Primary | ICD-10-CM

## 2021-04-30 DIAGNOSIS — R09.02 HYPOXIA: ICD-10-CM

## 2021-04-30 DIAGNOSIS — R06.00 DYSPNEA AND RESPIRATORY ABNORMALITIES: Primary | ICD-10-CM

## 2021-04-30 PROCEDURE — 71045 X-RAY EXAM CHEST 1 VIEW: CPT

## 2021-04-30 PROCEDURE — 96366 THER/PROPH/DIAG IV INF ADDON: CPT

## 2021-04-30 PROCEDURE — 99285 EMERGENCY DEPT VISIT HI MDM: CPT

## 2021-04-30 PROCEDURE — 96367 TX/PROPH/DG ADDL SEQ IV INF: CPT

## 2021-04-30 PROCEDURE — 96365 THER/PROPH/DIAG IV INF INIT: CPT

## 2021-04-30 PROCEDURE — 96375 TX/PRO/DX INJ NEW DRUG ADDON: CPT

## 2021-04-30 PROCEDURE — 93005 ELECTROCARDIOGRAM TRACING: CPT | Performed by: EMERGENCY MEDICINE

## 2021-04-30 RX ORDER — ALBUTEROL SULFATE 0.63 MG/3ML
1 SOLUTION RESPIRATORY (INHALATION) EVERY 6 HOURS PRN
COMMUNITY

## 2021-04-30 RX ORDER — MAGNESIUM SULFATE IN WATER 40 MG/ML
2000 INJECTION, SOLUTION INTRAVENOUS ONCE
Status: COMPLETED | OUTPATIENT
Start: 2021-05-01 | End: 2021-05-01

## 2021-04-30 RX ORDER — IPRATROPIUM BROMIDE AND ALBUTEROL SULFATE 2.5; .5 MG/3ML; MG/3ML
1 SOLUTION RESPIRATORY (INHALATION)
Status: COMPLETED | OUTPATIENT
Start: 2021-05-01 | End: 2021-05-01

## 2021-04-30 RX ORDER — METHYLPREDNISOLONE SODIUM SUCCINATE 125 MG/2ML
125 INJECTION, POWDER, LYOPHILIZED, FOR SOLUTION INTRAMUSCULAR; INTRAVENOUS ONCE
Status: COMPLETED | OUTPATIENT
Start: 2021-05-01 | End: 2021-05-01

## 2021-04-30 RX ORDER — LORATADINE 10 MG/1
10 CAPSULE, LIQUID FILLED ORAL DAILY
COMMUNITY

## 2021-05-01 ENCOUNTER — APPOINTMENT (OUTPATIENT)
Dept: CT IMAGING | Age: 56
End: 2021-05-01
Payer: MEDICAID

## 2021-05-01 ENCOUNTER — HOSPITAL ENCOUNTER (INPATIENT)
Age: 56
LOS: 2 days | Discharge: HOME | DRG: 140 | End: 2021-05-03
Attending: INTERNAL MEDICINE | Admitting: INTERNAL MEDICINE
Payer: MEDICAID

## 2021-05-01 VITALS
TEMPERATURE: 97.8 F | OXYGEN SATURATION: 91 % | HEART RATE: 120 BPM | DIASTOLIC BLOOD PRESSURE: 78 MMHG | SYSTOLIC BLOOD PRESSURE: 143 MMHG | RESPIRATION RATE: 16 BRPM

## 2021-05-01 PROBLEM — J44.1 COPD EXACERBATION (HCC): Status: ACTIVE | Noted: 2021-05-01

## 2021-05-01 PROBLEM — R06.89 DYSPNEA AND RESPIRATORY ABNORMALITIES: Status: ACTIVE | Noted: 2017-08-23

## 2021-05-01 PROBLEM — R09.02 HYPOXIA: Status: ACTIVE | Noted: 2021-05-01

## 2021-05-01 PROBLEM — R06.00 DYSPNEA AND RESPIRATORY ABNORMALITIES: Status: ACTIVE | Noted: 2017-08-23

## 2021-05-01 LAB
ADENOVIRUS BY PCR: NOT DETECTED
ALBUMIN SERPL-MCNC: 4.1 G/DL (ref 3.5–5.2)
ALP BLD-CCNC: 75 U/L (ref 40–129)
ALT SERPL-CCNC: 24 U/L (ref 0–40)
AMYLASE: 46 U/L (ref 20–100)
ANION GAP SERPL CALCULATED.3IONS-SCNC: 8 MMOL/L (ref 7–16)
ARTERIAL PH AT TEMPERATURE: 7.34 (ref 7.35–7.45)
AST SERPL-CCNC: 24 U/L (ref 0–39)
BASE EXCESS: 4 MMOL/L (ref -3–3)
BILIRUB SERPL-MCNC: 0.6 MG/DL (ref 0–1.2)
BILIRUBIN DIRECT: 0.2 MG/DL (ref 0–0.3)
BILIRUBIN, INDIRECT: 0.4 MG/DL (ref 0–1)
BORDETELLA PARAPERTUSSIS BY PCR: NOT DETECTED
BORDETELLA PERTUSSIS BY PCR: NOT DETECTED
BUN BLDV-MCNC: 24 MG/DL (ref 6–20)
CALCIUM SERPL-MCNC: 9.1 MG/DL (ref 8.6–10.2)
CHLAMYDOPHILIA PNEUMONIAE BY PCR: NOT DETECTED
CHLORIDE BLD-SCNC: 100 MMOL/L (ref 98–107)
CK MB: 4.7 NG/ML (ref 0–7.7)
CO2: 31 MMOL/L (ref 22–29)
CORONAVIRUS 229E BY PCR: NOT DETECTED
CORONAVIRUS HKU1 BY PCR: NOT DETECTED
CORONAVIRUS NL63 BY PCR: NOT DETECTED
CORONAVIRUS OC43 BY PCR: NOT DETECTED
CREAT SERPL-MCNC: 1.3 MG/DL (ref 0.7–1.2)
D DIMER: 256 NG/ML DDU
DRAWN BY: ABNORMAL
EKG ATRIAL RATE: 83 BPM
EKG P AXIS: 50 DEGREES
EKG P-R INTERVAL: 176 MS
EKG Q-T INTERVAL: 386 MS
EKG QRS DURATION: 102 MS
EKG QTC CALCULATION (BAZETT): 453 MS
EKG R AXIS: 17 DEGREES
EKG T AXIS: 32 DEGREES
EKG VENTRICULAR RATE: 83 BPM
GFR AFRICAN AMERICAN: >60
GFR NON-AFRICAN AMERICAN: 58 ML/MIN/1.73
GLUCOSE BLD-MCNC: 106 MG/DL (ref 74–99)
HCO3: 31.8 MMOL/L (ref 22–26)
HCT VFR BLD CALC: 45.9 % (ref 37–54)
HEMOGLOBIN: 14.8 G/DL (ref 12.5–16.5)
HUMAN METAPNEUMOVIRUS BY PCR: NOT DETECTED
HUMAN RHINOVIRUS/ENTEROVIRUS BY PCR: NOT DETECTED
INFLUENZA A BY PCR: NOT DETECTED
INFLUENZA B BY PCR: NOT DETECTED
INR BLD: 1.1
INSPIRED O2: 21 %
LACTIC ACID: 0.8 MMOL/L (ref 0.5–2.2)
LIPASE: 17 U/L (ref 13–60)
LV EF: 56 %
LVEF MODALITY: NORMAL
MCH RBC QN AUTO: 28.6 PG (ref 26–35)
MCHC RBC AUTO-ENTMCNC: 32.2 % (ref 32–34.5)
MCV RBC AUTO: 88.8 FL (ref 80–99.9)
MODE: ABNORMAL
MYCOPLASMA PNEUMONIAE BY PCR: NOT DETECTED
O2 SATURATION: 85.2 % (ref 92–98.5)
PARAINFLUENZA VIRUS 1 BY PCR: NOT DETECTED
PARAINFLUENZA VIRUS 2 BY PCR: NOT DETECTED
PARAINFLUENZA VIRUS 3 BY PCR: NOT DETECTED
PARAINFLUENZA VIRUS 4 BY PCR: NOT DETECTED
PCO2: 59.2 MMHG (ref 35–45)
PDW BLD-RTO: 13 FL (ref 11.5–15)
PLATELET # BLD: 196 E9/L (ref 130–450)
PMV BLD AUTO: 8.7 FL (ref 7–12)
PO2: 55 MMHG (ref 0–100)
POTASSIUM SERPL-SCNC: 4.3 MMOL/L (ref 3.5–5)
PRO-BNP: 41 PG/ML (ref 0–125)
PROTHROMBIN TIME: 12 SEC (ref 9.3–12.4)
RBC # BLD: 5.17 E12/L (ref 3.8–5.8)
RESPIRATORY SYNCYTIAL VIRUS BY PCR: NOT DETECTED
SAMPLE SITE: ABNORMAL
SARS-COV-2, PCR: NOT DETECTED
SODIUM BLD-SCNC: 139 MMOL/L (ref 132–146)
SPECIMEN SOURCE: ABNORMAL
TOTAL CK: 228 U/L (ref 20–200)
TOTAL PROTEIN: 7.7 G/DL (ref 6.4–8.3)
TROPONIN: <0.01 NG/ML (ref 0–0.03)
WBC # BLD: 9.8 E9/L (ref 4.5–11.5)

## 2021-05-01 PROCEDURE — 6370000000 HC RX 637 (ALT 250 FOR IP): Performed by: INTERNAL MEDICINE

## 2021-05-01 PROCEDURE — 6370000000 HC RX 637 (ALT 250 FOR IP): Performed by: EMERGENCY MEDICINE

## 2021-05-01 PROCEDURE — 82150 ASSAY OF AMYLASE: CPT

## 2021-05-01 PROCEDURE — 6360000002 HC RX W HCPCS: Performed by: INTERNAL MEDICINE

## 2021-05-01 PROCEDURE — 84484 ASSAY OF TROPONIN QUANT: CPT

## 2021-05-01 PROCEDURE — 83880 ASSAY OF NATRIURETIC PEPTIDE: CPT

## 2021-05-01 PROCEDURE — 99222 1ST HOSP IP/OBS MODERATE 55: CPT | Performed by: INTERNAL MEDICINE

## 2021-05-01 PROCEDURE — 93010 ELECTROCARDIOGRAM REPORT: CPT | Performed by: INTERNAL MEDICINE

## 2021-05-01 PROCEDURE — 85378 FIBRIN DEGRADE SEMIQUANT: CPT

## 2021-05-01 PROCEDURE — 36415 COLL VENOUS BLD VENIPUNCTURE: CPT

## 2021-05-01 PROCEDURE — 2580000003 HC RX 258: Performed by: INTERNAL MEDICINE

## 2021-05-01 PROCEDURE — 82550 ASSAY OF CK (CPK): CPT

## 2021-05-01 PROCEDURE — 99285 EMERGENCY DEPT VISIT HI MDM: CPT

## 2021-05-01 PROCEDURE — 1200000000 HC SEMI PRIVATE

## 2021-05-01 PROCEDURE — 87070 CULTURE OTHR SPECIMN AEROBIC: CPT

## 2021-05-01 PROCEDURE — 87205 SMEAR GRAM STAIN: CPT

## 2021-05-01 PROCEDURE — 6360000004 HC RX CONTRAST MEDICATION: Performed by: RADIOLOGY

## 2021-05-01 PROCEDURE — 87040 BLOOD CULTURE FOR BACTERIA: CPT

## 2021-05-01 PROCEDURE — 80076 HEPATIC FUNCTION PANEL: CPT

## 2021-05-01 PROCEDURE — 82803 BLOOD GASES ANY COMBINATION: CPT

## 2021-05-01 PROCEDURE — 2580000003 HC RX 258: Performed by: EMERGENCY MEDICINE

## 2021-05-01 PROCEDURE — 6360000002 HC RX W HCPCS: Performed by: EMERGENCY MEDICINE

## 2021-05-01 PROCEDURE — 96366 THER/PROPH/DIAG IV INF ADDON: CPT

## 2021-05-01 PROCEDURE — 83690 ASSAY OF LIPASE: CPT

## 2021-05-01 PROCEDURE — 2580000003 HC RX 258

## 2021-05-01 PROCEDURE — 85027 COMPLETE CBC AUTOMATED: CPT

## 2021-05-01 PROCEDURE — 0202U NFCT DS 22 TRGT SARS-COV-2: CPT

## 2021-05-01 PROCEDURE — 96367 TX/PROPH/DG ADDL SEQ IV INF: CPT

## 2021-05-01 PROCEDURE — 83605 ASSAY OF LACTIC ACID: CPT

## 2021-05-01 PROCEDURE — 82553 CREATINE MB FRACTION: CPT

## 2021-05-01 PROCEDURE — 96365 THER/PROPH/DIAG IV INF INIT: CPT

## 2021-05-01 PROCEDURE — 2580000003 HC RX 258: Performed by: RADIOLOGY

## 2021-05-01 PROCEDURE — 71275 CT ANGIOGRAPHY CHEST: CPT

## 2021-05-01 PROCEDURE — 85610 PROTHROMBIN TIME: CPT

## 2021-05-01 PROCEDURE — 94640 AIRWAY INHALATION TREATMENT: CPT

## 2021-05-01 PROCEDURE — 80048 BASIC METABOLIC PNL TOTAL CA: CPT

## 2021-05-01 PROCEDURE — 93306 TTE W/DOPPLER COMPLETE: CPT

## 2021-05-01 PROCEDURE — 87206 SMEAR FLUORESCENT/ACID STAI: CPT

## 2021-05-01 PROCEDURE — 96375 TX/PRO/DX INJ NEW DRUG ADDON: CPT

## 2021-05-01 RX ORDER — FUROSEMIDE 10 MG/ML
20 INJECTION INTRAMUSCULAR; INTRAVENOUS DAILY
Status: DISCONTINUED | OUTPATIENT
Start: 2021-05-01 | End: 2021-05-03 | Stop reason: HOSPADM

## 2021-05-01 RX ORDER — LISINOPRIL 20 MG/1
20 TABLET ORAL DAILY
Status: DISCONTINUED | OUTPATIENT
Start: 2021-05-01 | End: 2021-05-03 | Stop reason: HOSPADM

## 2021-05-01 RX ORDER — METHYLPREDNISOLONE SODIUM SUCCINATE 125 MG/2ML
60 INJECTION, POWDER, LYOPHILIZED, FOR SOLUTION INTRAMUSCULAR; INTRAVENOUS EVERY 8 HOURS
Status: DISCONTINUED | OUTPATIENT
Start: 2021-05-01 | End: 2021-05-01

## 2021-05-01 RX ORDER — METHYLPREDNISOLONE SODIUM SUCCINATE 40 MG/ML
40 INJECTION, POWDER, LYOPHILIZED, FOR SOLUTION INTRAMUSCULAR; INTRAVENOUS EVERY 8 HOURS
Status: DISCONTINUED | OUTPATIENT
Start: 2021-05-01 | End: 2021-05-02

## 2021-05-01 RX ORDER — IPRATROPIUM BROMIDE AND ALBUTEROL SULFATE 2.5; .5 MG/3ML; MG/3ML
1 SOLUTION RESPIRATORY (INHALATION)
Status: DISCONTINUED | OUTPATIENT
Start: 2021-05-01 | End: 2021-05-03 | Stop reason: HOSPADM

## 2021-05-01 RX ORDER — HYDROCHLOROTHIAZIDE 12.5 MG/1
12.5 TABLET ORAL DAILY
Status: DISCONTINUED | OUTPATIENT
Start: 2021-05-01 | End: 2021-05-03 | Stop reason: HOSPADM

## 2021-05-01 RX ORDER — ACETAMINOPHEN 650 MG/1
650 SUPPOSITORY RECTAL EVERY 6 HOURS PRN
Status: DISCONTINUED | OUTPATIENT
Start: 2021-05-01 | End: 2021-05-03 | Stop reason: HOSPADM

## 2021-05-01 RX ORDER — SODIUM CHLORIDE 0.9 % (FLUSH) 0.9 %
10 SYRINGE (ML) INJECTION ONCE
Status: COMPLETED | OUTPATIENT
Start: 2021-05-01 | End: 2021-05-01

## 2021-05-01 RX ORDER — LISINOPRIL AND HYDROCHLOROTHIAZIDE 20; 12.5 MG/1; MG/1
1 TABLET ORAL DAILY
Status: DISCONTINUED | OUTPATIENT
Start: 2021-05-01 | End: 2021-05-01 | Stop reason: CLARIF

## 2021-05-01 RX ORDER — LANOLIN ALCOHOL/MO/W.PET/CERES
100 CREAM (GRAM) TOPICAL DAILY
Status: DISCONTINUED | OUTPATIENT
Start: 2021-05-01 | End: 2021-05-03 | Stop reason: HOSPADM

## 2021-05-01 RX ORDER — METHYLPREDNISOLONE 4 MG/1
8 TABLET ORAL DAILY
Status: DISCONTINUED | OUTPATIENT
Start: 2021-05-01 | End: 2021-05-01

## 2021-05-01 RX ORDER — SODIUM CHLORIDE 9 MG/ML
25 INJECTION, SOLUTION INTRAVENOUS PRN
Status: DISCONTINUED | OUTPATIENT
Start: 2021-05-01 | End: 2021-05-03 | Stop reason: HOSPADM

## 2021-05-01 RX ORDER — POLYETHYLENE GLYCOL 3350 17 G/17G
17 POWDER, FOR SOLUTION ORAL DAILY PRN
Status: DISCONTINUED | OUTPATIENT
Start: 2021-05-01 | End: 2021-05-03 | Stop reason: HOSPADM

## 2021-05-01 RX ORDER — SODIUM CHLORIDE 0.9 % (FLUSH) 0.9 %
5-40 SYRINGE (ML) INJECTION PRN
Status: DISCONTINUED | OUTPATIENT
Start: 2021-05-01 | End: 2021-05-03 | Stop reason: HOSPADM

## 2021-05-01 RX ORDER — AMOXICILLIN AND CLAVULANATE POTASSIUM 875; 125 MG/1; MG/1
1 TABLET, FILM COATED ORAL EVERY 12 HOURS SCHEDULED
Status: DISCONTINUED | OUTPATIENT
Start: 2021-05-01 | End: 2021-05-03 | Stop reason: HOSPADM

## 2021-05-01 RX ORDER — ALBUTEROL SULFATE 0.63 MG/3ML
1 SOLUTION RESPIRATORY (INHALATION) EVERY 6 HOURS PRN
Status: DISCONTINUED | OUTPATIENT
Start: 2021-05-01 | End: 2021-05-03 | Stop reason: HOSPADM

## 2021-05-01 RX ORDER — SODIUM CHLORIDE 0.9 % (FLUSH) 0.9 %
5-40 SYRINGE (ML) INJECTION EVERY 12 HOURS SCHEDULED
Status: DISCONTINUED | OUTPATIENT
Start: 2021-05-01 | End: 2021-05-03 | Stop reason: HOSPADM

## 2021-05-01 RX ORDER — ACETAMINOPHEN 325 MG/1
650 TABLET ORAL EVERY 6 HOURS PRN
Status: DISCONTINUED | OUTPATIENT
Start: 2021-05-01 | End: 2021-05-03 | Stop reason: HOSPADM

## 2021-05-01 RX ORDER — CEFTRIAXONE 1 G/1
INJECTION, POWDER, FOR SOLUTION INTRAMUSCULAR; INTRAVENOUS
Status: DISCONTINUED
Start: 2021-05-01 | End: 2021-05-01

## 2021-05-01 RX ORDER — METHYLPREDNISOLONE SODIUM SUCCINATE 125 MG/2ML
60 INJECTION, POWDER, LYOPHILIZED, FOR SOLUTION INTRAMUSCULAR; INTRAVENOUS EVERY 6 HOURS
Status: DISCONTINUED | OUTPATIENT
Start: 2021-05-01 | End: 2021-05-01

## 2021-05-01 RX ORDER — LISINOPRIL 20 MG/1
20 TABLET ORAL DAILY
Status: DISCONTINUED | OUTPATIENT
Start: 2021-05-01 | End: 2021-05-01

## 2021-05-01 RX ORDER — BENZONATATE 100 MG/1
100 CAPSULE ORAL 3 TIMES DAILY PRN
Status: DISCONTINUED | OUTPATIENT
Start: 2021-05-01 | End: 2021-05-03 | Stop reason: HOSPADM

## 2021-05-01 RX ORDER — ONDANSETRON 2 MG/ML
4 INJECTION INTRAMUSCULAR; INTRAVENOUS EVERY 6 HOURS PRN
Status: DISCONTINUED | OUTPATIENT
Start: 2021-05-01 | End: 2021-05-03 | Stop reason: HOSPADM

## 2021-05-01 RX ORDER — PROMETHAZINE HYDROCHLORIDE 25 MG/1
12.5 TABLET ORAL EVERY 6 HOURS PRN
Status: DISCONTINUED | OUTPATIENT
Start: 2021-05-01 | End: 2021-05-03 | Stop reason: HOSPADM

## 2021-05-01 RX ORDER — CEFEPIME HYDROCHLORIDE 1 G/1
INJECTION, POWDER, FOR SOLUTION INTRAMUSCULAR; INTRAVENOUS
Status: DISCONTINUED
Start: 2021-05-01 | End: 2021-05-01 | Stop reason: HOSPADM

## 2021-05-01 RX ADMIN — IOPAMIDOL 75 ML: 755 INJECTION, SOLUTION INTRAVENOUS at 01:03

## 2021-05-01 RX ADMIN — Medication 100 MG: at 08:17

## 2021-05-01 RX ADMIN — IPRATROPIUM BROMIDE AND ALBUTEROL SULFATE 1 AMPULE: .5; 3 SOLUTION RESPIRATORY (INHALATION) at 00:18

## 2021-05-01 RX ADMIN — ENOXAPARIN SODIUM 40 MG: 40 INJECTION SUBCUTANEOUS at 08:17

## 2021-05-01 RX ADMIN — ACETAMINOPHEN 650 MG: 325 TABLET ORAL at 20:57

## 2021-05-01 RX ADMIN — WATER 1 ML: 1 INJECTION INTRAMUSCULAR; INTRAVENOUS; SUBCUTANEOUS at 14:36

## 2021-05-01 RX ADMIN — IPRATROPIUM BROMIDE AND ALBUTEROL SULFATE 1 AMPULE: .5; 3 SOLUTION RESPIRATORY (INHALATION) at 12:19

## 2021-05-01 RX ADMIN — AMOXICILLIN AND CLAVULANATE POTASSIUM 1 TABLET: 875; 125 TABLET, FILM COATED ORAL at 08:17

## 2021-05-01 RX ADMIN — BENZONATATE 100 MG: 100 CAPSULE ORAL at 20:57

## 2021-05-01 RX ADMIN — IPRATROPIUM BROMIDE AND ALBUTEROL SULFATE 1 AMPULE: .5; 3 SOLUTION RESPIRATORY (INHALATION) at 16:51

## 2021-05-01 RX ADMIN — IPRATROPIUM BROMIDE AND ALBUTEROL SULFATE 1 AMPULE: .5; 3 SOLUTION RESPIRATORY (INHALATION) at 08:29

## 2021-05-01 RX ADMIN — IPRATROPIUM BROMIDE AND ALBUTEROL SULFATE 1 AMPULE: .5; 3 SOLUTION RESPIRATORY (INHALATION) at 00:35

## 2021-05-01 RX ADMIN — AMOXICILLIN AND CLAVULANATE POTASSIUM 1 TABLET: 875; 125 TABLET, FILM COATED ORAL at 20:57

## 2021-05-01 RX ADMIN — FUROSEMIDE 20 MG: 10 INJECTION, SOLUTION INTRAMUSCULAR; INTRAVENOUS at 11:06

## 2021-05-01 RX ADMIN — LISINOPRIL 20 MG: 20 TABLET ORAL at 08:17

## 2021-05-01 RX ADMIN — IPRATROPIUM BROMIDE AND ALBUTEROL SULFATE 1 AMPULE: .5; 3 SOLUTION RESPIRATORY (INHALATION) at 20:04

## 2021-05-01 RX ADMIN — METHYLPREDNISOLONE SODIUM SUCCINATE 40 MG: 40 INJECTION, POWDER, LYOPHILIZED, FOR SOLUTION INTRAMUSCULAR; INTRAVENOUS at 14:36

## 2021-05-01 RX ADMIN — HYDROCHLOROTHIAZIDE 12.5 MG: 12.5 TABLET ORAL at 08:17

## 2021-05-01 RX ADMIN — Medication 10 ML: at 00:19

## 2021-05-01 RX ADMIN — SODIUM CHLORIDE, PRESERVATIVE FREE 10 ML: 5 INJECTION INTRAVENOUS at 14:36

## 2021-05-01 RX ADMIN — SODIUM CHLORIDE, PRESERVATIVE FREE 10 ML: 5 INJECTION INTRAVENOUS at 08:18

## 2021-05-01 RX ADMIN — CEFEPIME 2000 MG: 2 INJECTION, POWDER, FOR SOLUTION INTRAVENOUS at 02:36

## 2021-05-01 RX ADMIN — MAGNESIUM SULFATE HEPTAHYDRATE 2000 MG: 40 INJECTION, SOLUTION INTRAVENOUS at 00:15

## 2021-05-01 RX ADMIN — IPRATROPIUM BROMIDE AND ALBUTEROL SULFATE 1 AMPULE: .5; 3 SOLUTION RESPIRATORY (INHALATION) at 00:25

## 2021-05-01 RX ADMIN — SODIUM CHLORIDE, PRESERVATIVE FREE 10 ML: 5 INJECTION INTRAVENOUS at 11:06

## 2021-05-01 RX ADMIN — METHYLPREDNISOLONE SODIUM SUCCINATE 60 MG: 125 INJECTION, POWDER, LYOPHILIZED, FOR SOLUTION INTRAMUSCULAR; INTRAVENOUS at 06:55

## 2021-05-01 RX ADMIN — METHYLPREDNISOLONE SODIUM SUCCINATE 125 MG: 125 INJECTION, POWDER, FOR SOLUTION INTRAMUSCULAR; INTRAVENOUS at 00:15

## 2021-05-01 ASSESSMENT — ENCOUNTER SYMPTOMS
EYE PAIN: 0
SORE THROAT: 0
EYE REDNESS: 0
EYE DISCHARGE: 0
SHORTNESS OF BREATH: 1
ABDOMINAL PAIN: 0
SINUS PRESSURE: 0
VOMITING: 0
DIARRHEA: 0
COUGH: 1
NAUSEA: 0
BACK PAIN: 0
WHEEZING: 1

## 2021-05-01 ASSESSMENT — PAIN DESCRIPTION - LOCATION: LOCATION: GENERALIZED;BACK

## 2021-05-01 ASSESSMENT — PAIN SCALES - GENERAL
PAINLEVEL_OUTOF10: 7
PAINLEVEL_OUTOF10: 4

## 2021-05-01 ASSESSMENT — PAIN DESCRIPTION - ORIENTATION: ORIENTATION: LOWER

## 2021-05-01 ASSESSMENT — PAIN DESCRIPTION - PAIN TYPE: TYPE: CHRONIC PAIN

## 2021-05-01 NOTE — PROGRESS NOTES
[facility] Progress Note    Admitting Date and Time: 5/1/2021  5:10 AM  Admit Dx: COPD exacerbation ([facility] [J44.1]    Subjective:  Patient is being followed for COPD exacerbation ([facility] [J44.1]   Pt feels better, still having cough and congestion, denied chest pain, reported some orthopnea     ROS: denies fever, chills, cp, sob, n/v, HA unless stated above.       ipratropium-albuterol  1 ampule Inhalation Q4H WA    thiamine  100 mg Oral Daily    sodium chloride flush  5-40 mL Intravenous 2 times per day    enoxaparin  40 mg Subcutaneous Daily    lisinopril  20 mg Oral Daily    And    hydroCHLOROthiazide  12.5 mg Oral Daily    amoxicillin-clavulanate  1 tablet Oral 2 times per day    methylPREDNISolone  60 mg Intravenous Q6H     albuterol, 1 ampule, Q6H PRN  sodium chloride flush, 5-40 mL, PRN  sodium chloride, 25 mL, PRN  promethazine, 12.5 mg, Q6H PRN    Or  ondansetron, 4 mg, Q6H PRN  polyethylene glycol, 17 g, Daily PRN  acetaminophen, 650 mg, Q6H PRN    Or  acetaminophen, 650 mg, Q6H PRN  benzonatate, 100 mg, TID PRN         Objective:    BP (!) 161/79   Pulse 96   Temp 98.5 °F (36.9 °C) (Oral)   Resp 18   Ht 6' 5\" (1.956 m)   Wt (!) 373 lb (169.2 kg)   SpO2 95%   BMI 44.23 kg/m²     General Appearance: alert and oriented to person, place and time and in no acute distress  Skin: warm and dry  Head: normocephalic and atraumatic  Eyes: pupils equal, round, and reactive to light, extraocular eye movements intact, conjunctivae normal  Neck: neck supple and non tender without mass   Pulmonary/Chest: clear to auscultation bilaterally- no wheezes, rales or rhonchi, normal air movement, no respiratory distress  Cardiovascular: normal rate, normal S1 and S2 and no carotid bruits  Abdomen: soft, non-tender, non-distended, normal bowel sounds, no masses or organomegaly  Extremities: no cyanosis, no clubbing and +1 edema  Neurologic: no cranial nerve deficit and speech normal        Recent Labs     05/01/21  0001      K 4.3      CO2 31*   BUN 24*   CREATININE 1.3*   GLUCOSE 106*   CALCIUM 9.1       Recent Labs     05/01/21  0001   WBC 9.8   RBC 5.17   HGB 14.8   HCT 45.9   MCV 88.8   MCH 28.6   MCHC 32.2   RDW 13.0      MPV 8.7       Assessment:    Active Problems:    COPD exacerbation (HCC)  Resolved Problems:    * No resolved hospital problems. *      Plan:  1. Acute COPD exacerbation, will start patient on steroids empirically milliliters DuoNeb's and monitor. 2.  Acute sinus infection and bronchitis, patient was started on Augmentin monitor. 3.  History of hypertension, blood pressure is not controlled, continue home lisinopril and hydrochlorothiazide  4. Edema dn orthopnea, will obtain Echo and start lasix. NOTE: This report was transcribed using voice recognition software. Every effort was made to ensure accuracy; however, inadvertent computerized transcription errors may be present.   Electronically signed by Libra Maza MD on 5/1/2021 at 8:53 AM

## 2021-05-01 NOTE — PLAN OF CARE
Problem: OXYGENATION/RESPIRATORY FUNCTION  Goal: Patient will maintain patent airway  Outcome: Met This Shift     Problem: SKIN INTEGRITY  Goal: Skin integrity is maintained or improved  Outcome: Met This Shift

## 2021-05-01 NOTE — PROGRESS NOTES
Pt is sitting on the couch, pulse ox 91-92 % w/RA, Dr. Lucille Winkler is made aware during the rounds

## 2021-05-01 NOTE — H&P
2002    positive heart cath OK      NERVE BLOCK N/A 1/9/15    cervical epidural #2    NERVE BLOCK  01/23/15    cervical radiculitis    OTHER SURGICAL HISTORY N/A 12/19/14    cervical epidural nerve block under xray #1    UVULOPALATOPHARYGOPLASTY  2000       Medications Prior to Admission:    Prior to Admission medications    Medication Sig Start Date End Date Taking? Authorizing Provider   vitamin B-1 100 MG tablet Take 1 tablet by mouth daily 9/14/20  Yes Ladarius Flores MD   lisinopril-hydrochlorothiazide (PRINZIDE;ZESTORETIC) 20-12.5 MG per tablet Take 1 tablet by mouth daily 12/14/17  Yes Kavita Cowatr MD   albuterol (ACCUNEB) 0.63 MG/3ML nebulizer solution Take 1 ampule by nebulization every 6 hours as needed for Wheezing    Historical Provider, MD   loratadine (CLARITIN) 10 MG capsule Take 10 mg by mouth daily    Historical Provider, MD       Allergies:    Patient has no known allergies. Social History:    reports that he quit smoking about 2 years ago. His smoking use included cigarettes. He has a 52.00 pack-year smoking history. He has quit using smokeless tobacco. He reports current drug use. Drugs:  and Marijuana. He reports that he does not drink alcohol. Family History:   family history includes Cancer in his mother; Diabetes in his brother and mother; Heart Disease in his brother, father, and mother; Kidney Disease in his sister; Stroke in his mother.        PHYSICAL EXAM:  Vitals:  BP (!) 144/79   Pulse 80   Temp 97.8 °F (36.6 °C) (Oral)   Resp 17   Ht 6' 5\" (1.956 m)   Wt (!) 373 lb (169.2 kg)   SpO2 92%   BMI 44.23 kg/m²     General Appearance: alert and oriented to person, place and time and in no acute distress  Skin: warm and dry  Head: normocephalic and atraumatic  Eyes: pupils equal, round, and reactive to light, extraocular eye movements intact, conjunctivae normal  Neck: neck supple and non tender without mass   Pulmonary/Chest: clear to auscultation bilaterally- no wheezes, rales or rhonchi, normal air movement, no respiratory distress  Cardiovascular: normal rate, normal S1 and S2 and no carotid bruits  Abdomen: soft, non-tender, non-distended, normal bowel sounds, no masses or organomegaly  Extremities: no cyanosis, no clubbing and no edema  Neurologic: no cranial nerve deficit and speech normal        LABS:  Recent Labs     05/01/21  0001      K 4.3      CO2 31*   BUN 24*   CREATININE 1.3*   GLUCOSE 106*   CALCIUM 9.1       Recent Labs     05/01/21  0001   WBC 9.8   RBC 5.17   HGB 14.8   HCT 45.9   MCV 88.8   MCH 28.6   MCHC 32.2   RDW 13.0      MPV 8.7       No results for input(s): POCGLU in the last 72 hours. Radiology:   No orders to display       EKG: Sinus rhythm    ASSESSMENT:      Active Problems:    COPD exacerbation (HCC)  Resolved Problems:    * No resolved hospital problems. *      PLAN:    1. COPD exacerbation: Continue DuoNeb, IV methylprednisolone 60 mg 4 times daily, Augmentin, and Tessalon. 2.  Hypertension: Continue lisinopril 20 mg and hydrochlorothiazide 12.5 mg.  Monitor blood pressure. 3.  Morbidly obese: Diet and exercise. Code Status: Full  DVT prophylaxis: Subcu      NOTE: This report was transcribed using voice recognition software. Every effort was made to ensure accuracy; however, inadvertent computerized transcription errors may be present.   Electronically signed by Winsome James MD on 5/1/2021 at 6:42 AM

## 2021-05-01 NOTE — PROGRESS NOTES
Iv site is bleeding. Offered to remove and insert a new one. Pt refuses the insertion and also refuses IV team. Pt states that as long as it is not profusigly bleeding, he is fine with the current iv. RN educated pt that pt is on IV steroids and iv needs to be in the vein. Pt states it is in the vein. IV site is then cleansed and new dsg is applied. Will continue to monitor. Pt is instructed to notify staff if the bleeding is get worse.

## 2021-05-01 NOTE — ED NOTES
Pt placed on 4L NC. Pulse ox dropping into low 80s on RA.  Pulse ox 94% on 4L     Denver Leopard, RN  05/01/21 8277

## 2021-05-01 NOTE — ED PROVIDER NOTES
PATIENT HAS BEEN COUGHING AND SOB FOR SEVERAL DAYS CONTINUES TO VAPE/SMOKE NOT NORMALLY ON OXYGEN AND HIS SISTERS PULSE OX READ IN HIGH 70'S TO LOW 80'S AT HOME. NO CHEST PAIN NO BACK PAIN MILD BILATERAL LEG SWELLING. IS SUPPOSE TO BE ON ELIQUIS BUT HAS NOT TAKEN IN A YEAR FOR A PE. POSSIBLE HISTORY OF PREVIOUS MI PER PATIENT BUT NO HISTORY OF CHF. HAS NOT HAD COVID BEFORE BUT HAD VACCINATION. SUPPOSE TO BE ON BIPAP AT NIGHT BUT CAN'T TOLERATE IT    The history is provided by the patient. Review of Systems   Constitutional: Negative for chills and fever. HENT: Negative for ear pain, sinus pressure and sore throat. Eyes: Negative for pain, discharge and redness. Respiratory: Positive for cough, shortness of breath and wheezing. Cardiovascular: Positive for leg swelling. Negative for chest pain. Gastrointestinal: Negative for abdominal pain, diarrhea, nausea and vomiting. Genitourinary: Negative for dysuria and frequency. Musculoskeletal: Negative for arthralgias and back pain. Skin: Negative for rash and wound. Neurological: Negative for weakness and headaches. Hematological: Negative for adenopathy. All other systems reviewed and are negative. Physical Exam  Vitals signs and nursing note reviewed. Constitutional:       General: He is not in acute distress. Appearance: He is well-developed. He is obese. HENT:      Head: Normocephalic and atraumatic. Eyes:      Conjunctiva/sclera: Conjunctivae normal.   Neck:      Musculoskeletal: Normal range of motion and neck supple. Cardiovascular:      Rate and Rhythm: Normal rate and regular rhythm. Heart sounds: Normal heart sounds. No murmur. Pulmonary:      Effort: Pulmonary effort is normal.      Breath sounds: Examination of the right-lower field reveals decreased breath sounds and wheezing. Examination of the left-lower field reveals decreased breath sounds and wheezing. Decreased breath sounds and wheezing present. has no known allergies.     -------------------------------------------------- RESULTS -------------------------------------------------    LABS:  Results for orders placed or performed during the hospital encounter of 04/30/21   Troponin   Result Value Ref Range    Troponin <0.01 0.00 - 0.03 ng/mL   CBC   Result Value Ref Range    WBC 9.8 4.5 - 11.5 E9/L    RBC 5.17 3.80 - 5.80 E12/L    Hemoglobin 14.8 12.5 - 16.5 g/dL    Hematocrit 45.9 37.0 - 54.0 %    MCV 88.8 80.0 - 99.9 fL    MCH 28.6 26.0 - 35.0 pg    MCHC 32.2 32.0 - 34.5 %    RDW 13.0 11.5 - 15.0 fL    Platelets 493 339 - 086 E9/L    MPV 8.7 7.0 - 12.0 fL   Basic Metabolic Panel   Result Value Ref Range    Sodium 139 132 - 146 mmol/L    Potassium 4.3 3.5 - 5.0 mmol/L    Chloride 100 98 - 107 mmol/L    CO2 31 (H) 22 - 29 mmol/L    Anion Gap 8 7 - 16 mmol/L    Glucose 106 (H) 74 - 99 mg/dL    BUN 24 (H) 6 - 20 mg/dL    CREATININE 1.3 (H) 0.7 - 1.2 mg/dL    GFR Non-African American 58 >=60 mL/min/1.73    GFR African American >60     Calcium 9.1 8.6 - 10.2 mg/dL   CK MB   Result Value Ref Range    CK-MB 4.7 0.0 - 7.7 ng/mL   CK   Result Value Ref Range    Total  (H) 20 - 200 U/L   D-Dimer, Quantitative   Result Value Ref Range    D-Dimer, Quant 256 ng/mL DDU   Hepatic Function Panel   Result Value Ref Range    Total Protein 7.7 6.4 - 8.3 g/dL    Albumin 4.1 3.5 - 5.2 g/dL    Alkaline Phosphatase 75 40 - 129 U/L    ALT 24 0 - 40 U/L    AST 24 0 - 39 U/L    Total Bilirubin 0.6 0.0 - 1.2 mg/dL    Bilirubin, Direct 0.2 0.0 - 0.3 mg/dL    Bilirubin, Indirect 0.4 0.0 - 1.0 mg/dL   Lipase   Result Value Ref Range    Lipase 17 13 - 60 U/L   Amylase   Result Value Ref Range    Amylase 46 20 - 100 U/L   Brain Natriuretic Peptide   Result Value Ref Range    Pro-BNP 41 0 - 125 pg/mL   Protime-INR   Result Value Ref Range    Protime 12.0 9.3 - 12.4 sec    INR 1.1    Lactic Acid, Plasma   Result Value Ref Range    Lactic Acid 0.8 0.5 - 2.2 mmol/L   Blood Gas, Arterial Result Value Ref Range    Arterial pH at Temperature 7.338 (L) 7.350 - 7.450    PCO2 59.2 (H) 35.0 - 45.0 mmHg    PO2 55.0 0.0 - 100.0 mmHg    HCO3 31.8 (H) 22.0 - 26.0 mmol/L    Base Excess 4.0 (H) -3.0 - 3.0 mmol/L    O2 Sat 85.2 (L) 92.0 - 98.5 %    Drawn By [facility] AT [place]     Specimen Source Arterial     Sample Site Radial Puncture     Mode Room Air     Inspired O2 21 %   EKG 12 Lead   Result Value Ref Range    Ventricular Rate 83 BPM    Atrial Rate 83 BPM    P-R Interval 176 ms    QRS Duration 102 ms    Q-T Interval 386 ms    QTc Calculation (Bazett) 453 ms    P Axis 50 degrees    R Axis 17 degrees    T Axis 32 degrees       RADIOLOGY:  XR CHEST PORTABLE   Final Result   Atelectatic changes in the left lung base. No other radiographic evidence of   acute cardiopulmonary disease. CTA PULMONARY W CONTRAST    (Results Pending)       EKG: This EKG is signed and interpreted by me. Rate: 83  Rhythm: Sinus  Interpretation: no acute changes  Comparison: stable as compared to patient's most recent EKG      ------------------------- NURSING NOTES AND VITALS REVIEWED ---------------------------   The nursing notes within the ED encounter and vital signs as below have been reviewed. BP (!) 147/82   Pulse 89   Temp 98 °F (36.7 °C) (Tympanic)   Resp 16   SpO2 92%   Oxygen Saturation Interpretation: Abnormal      ------------------------------------------ PROGRESS NOTES ------------------------------------------     Consultations:  MEDICINE    Counseling:   I have spoken with the patient and discussed todays results, in addition to providing specific details for the plan of care and counseling regarding the diagnosis and prognosis.   Their questions are answered at this time and they are agreeable with the plan.      --------------------------------- ADDITIONAL PROVIDER NOTES ---------------------------------         This patient's ED course included: a personal history and physicial examination, re-evaluation prior

## 2021-05-01 NOTE — ED NOTES
ISACC Vincent from the access line called with room number 526. [facility] called at this time for transport. ETA 90 minutes.      Corey Nelson RN  05/01/21 [id number]

## 2021-05-02 LAB
ANION GAP SERPL CALCULATED.3IONS-SCNC: 9 MMOL/L (ref 7–16)
BASOPHILS ABSOLUTE: 0.02 E9/L (ref 0–0.2)
BASOPHILS RELATIVE PERCENT: 0.1 % (ref 0–2)
BUN BLDV-MCNC: 24 MG/DL (ref 6–20)
CALCIUM SERPL-MCNC: 9.3 MG/DL (ref 8.6–10.2)
CHLORIDE BLD-SCNC: 97 MMOL/L (ref 98–107)
CO2: 28 MMOL/L (ref 22–29)
CREAT SERPL-MCNC: 1.1 MG/DL (ref 0.7–1.2)
EOSINOPHILS ABSOLUTE: 0 E9/L (ref 0.05–0.5)
EOSINOPHILS RELATIVE PERCENT: 0 % (ref 0–6)
GFR AFRICAN AMERICAN: >60
GFR NON-AFRICAN AMERICAN: >60 ML/MIN/1.73
GLUCOSE BLD-MCNC: 159 MG/DL (ref 74–99)
GRAM STAIN ORDERABLE: NORMAL
HCT VFR BLD CALC: 44.9 % (ref 37–54)
HEMOGLOBIN: 14.6 G/DL (ref 12.5–16.5)
IMMATURE GRANULOCYTES #: 0.07 E9/L
IMMATURE GRANULOCYTES %: 0.4 % (ref 0–5)
LYMPHOCYTES ABSOLUTE: 1.1 E9/L (ref 1.5–4)
LYMPHOCYTES RELATIVE PERCENT: 6.4 % (ref 20–42)
MCH RBC QN AUTO: 29.2 PG (ref 26–35)
MCHC RBC AUTO-ENTMCNC: 32.5 % (ref 32–34.5)
MCV RBC AUTO: 89.8 FL (ref 80–99.9)
MONOCYTES ABSOLUTE: 0.51 E9/L (ref 0.1–0.95)
MONOCYTES RELATIVE PERCENT: 2.9 % (ref 2–12)
NEUTROPHILS ABSOLUTE: 15.61 E9/L (ref 1.8–7.3)
NEUTROPHILS RELATIVE PERCENT: 90.2 % (ref 43–80)
PDW BLD-RTO: 13.2 FL (ref 11.5–15)
PLATELET # BLD: 229 E9/L (ref 130–450)
PMV BLD AUTO: 9 FL (ref 7–12)
POTASSIUM REFLEX MAGNESIUM: 4.8 MMOL/L (ref 3.5–5)
PROCALCITONIN: 0.08 NG/ML (ref 0–0.08)
RBC # BLD: 5 E12/L (ref 3.8–5.8)
SODIUM BLD-SCNC: 134 MMOL/L (ref 132–146)
WBC # BLD: 17.3 E9/L (ref 4.5–11.5)

## 2021-05-02 PROCEDURE — 36415 COLL VENOUS BLD VENIPUNCTURE: CPT

## 2021-05-02 PROCEDURE — 6360000002 HC RX W HCPCS: Performed by: INTERNAL MEDICINE

## 2021-05-02 PROCEDURE — 2580000003 HC RX 258: Performed by: INTERNAL MEDICINE

## 2021-05-02 PROCEDURE — 80048 BASIC METABOLIC PNL TOTAL CA: CPT

## 2021-05-02 PROCEDURE — 6370000000 HC RX 637 (ALT 250 FOR IP): Performed by: INTERNAL MEDICINE

## 2021-05-02 PROCEDURE — 87449 NOS EACH ORGANISM AG IA: CPT

## 2021-05-02 PROCEDURE — 84145 PROCALCITONIN (PCT): CPT

## 2021-05-02 PROCEDURE — 99233 SBSQ HOSP IP/OBS HIGH 50: CPT | Performed by: INTERNAL MEDICINE

## 2021-05-02 PROCEDURE — 85025 COMPLETE CBC W/AUTO DIFF WBC: CPT

## 2021-05-02 PROCEDURE — 94660 CPAP INITIATION&MGMT: CPT

## 2021-05-02 PROCEDURE — 94640 AIRWAY INHALATION TREATMENT: CPT

## 2021-05-02 PROCEDURE — 2580000003 HC RX 258

## 2021-05-02 PROCEDURE — 1200000000 HC SEMI PRIVATE

## 2021-05-02 RX ORDER — FLUTICASONE PROPIONATE 50 MCG
2 SPRAY, SUSPENSION (ML) NASAL DAILY
Status: DISCONTINUED | OUTPATIENT
Start: 2021-05-02 | End: 2021-05-03 | Stop reason: HOSPADM

## 2021-05-02 RX ORDER — NICOTINE 21 MG/24HR
1 PATCH, TRANSDERMAL 24 HOURS TRANSDERMAL DAILY
Status: DISCONTINUED | OUTPATIENT
Start: 2021-05-02 | End: 2021-05-03 | Stop reason: HOSPADM

## 2021-05-02 RX ORDER — METHYLPREDNISOLONE SODIUM SUCCINATE 40 MG/ML
40 INJECTION, POWDER, LYOPHILIZED, FOR SOLUTION INTRAMUSCULAR; INTRAVENOUS EVERY 12 HOURS
Status: DISCONTINUED | OUTPATIENT
Start: 2021-05-02 | End: 2021-05-03

## 2021-05-02 RX ADMIN — METHYLPREDNISOLONE SODIUM SUCCINATE 40 MG: 40 INJECTION, POWDER, LYOPHILIZED, FOR SOLUTION INTRAMUSCULAR; INTRAVENOUS at 21:41

## 2021-05-02 RX ADMIN — Medication 100 MG: at 08:17

## 2021-05-02 RX ADMIN — SODIUM CHLORIDE, PRESERVATIVE FREE 10 ML: 5 INJECTION INTRAVENOUS at 00:20

## 2021-05-02 RX ADMIN — METHYLPREDNISOLONE SODIUM SUCCINATE 40 MG: 40 INJECTION, POWDER, LYOPHILIZED, FOR SOLUTION INTRAMUSCULAR; INTRAVENOUS at 08:17

## 2021-05-02 RX ADMIN — AMOXICILLIN AND CLAVULANATE POTASSIUM 1 TABLET: 875; 125 TABLET, FILM COATED ORAL at 08:17

## 2021-05-02 RX ADMIN — HYDROCHLOROTHIAZIDE 12.5 MG: 12.5 TABLET ORAL at 08:17

## 2021-05-02 RX ADMIN — LISINOPRIL 20 MG: 20 TABLET ORAL at 08:17

## 2021-05-02 RX ADMIN — IPRATROPIUM BROMIDE AND ALBUTEROL SULFATE 1 AMPULE: .5; 3 SOLUTION RESPIRATORY (INHALATION) at 09:24

## 2021-05-02 RX ADMIN — SALINE NASAL SPRAY 1 SPRAY: 1.5 SOLUTION NASAL at 10:19

## 2021-05-02 RX ADMIN — FLUTICASONE PROPIONATE 2 SPRAY: 50 SPRAY, METERED NASAL at 16:01

## 2021-05-02 RX ADMIN — IPRATROPIUM BROMIDE AND ALBUTEROL SULFATE 1 AMPULE: .5; 3 SOLUTION RESPIRATORY (INHALATION) at 12:59

## 2021-05-02 RX ADMIN — SODIUM CHLORIDE, PRESERVATIVE FREE 10 ML: 5 INJECTION INTRAVENOUS at 08:17

## 2021-05-02 RX ADMIN — WATER 1 ML: 1 INJECTION INTRAMUSCULAR; INTRAVENOUS; SUBCUTANEOUS at 08:17

## 2021-05-02 RX ADMIN — FUROSEMIDE 20 MG: 10 INJECTION, SOLUTION INTRAMUSCULAR; INTRAVENOUS at 08:17

## 2021-05-02 RX ADMIN — METHYLPREDNISOLONE SODIUM SUCCINATE 40 MG: 40 INJECTION, POWDER, LYOPHILIZED, FOR SOLUTION INTRAMUSCULAR; INTRAVENOUS at 00:40

## 2021-05-02 RX ADMIN — ENOXAPARIN SODIUM 40 MG: 40 INJECTION SUBCUTANEOUS at 08:16

## 2021-05-02 RX ADMIN — BENZONATATE 100 MG: 100 CAPSULE ORAL at 07:46

## 2021-05-02 RX ADMIN — IPRATROPIUM BROMIDE AND ALBUTEROL SULFATE 1 AMPULE: .5; 3 SOLUTION RESPIRATORY (INHALATION) at 20:12

## 2021-05-02 RX ADMIN — SODIUM CHLORIDE, PRESERVATIVE FREE 10 ML: 5 INJECTION INTRAVENOUS at 21:41

## 2021-05-02 RX ADMIN — AMOXICILLIN AND CLAVULANATE POTASSIUM 1 TABLET: 875; 125 TABLET, FILM COATED ORAL at 21:41

## 2021-05-02 ASSESSMENT — PAIN DESCRIPTION - DESCRIPTORS: DESCRIPTORS: ACHING;SORE

## 2021-05-02 ASSESSMENT — PAIN SCALES - GENERAL: PAINLEVEL_OUTOF10: 5

## 2021-05-02 ASSESSMENT — PAIN DESCRIPTION - ORIENTATION: ORIENTATION: LOWER

## 2021-05-02 ASSESSMENT — PAIN DESCRIPTION - PAIN TYPE: TYPE: CHRONIC PAIN

## 2021-05-02 ASSESSMENT — PAIN DESCRIPTION - LOCATION: LOCATION: GENERALIZED;BACK

## 2021-05-02 ASSESSMENT — PAIN DESCRIPTION - FREQUENCY: FREQUENCY: CONTINUOUS

## 2021-05-02 ASSESSMENT — PAIN DESCRIPTION - ONSET: ONSET: ON-GOING

## 2021-05-02 ASSESSMENT — PAIN - FUNCTIONAL ASSESSMENT: PAIN_FUNCTIONAL_ASSESSMENT: ACTIVITIES ARE NOT PREVENTED

## 2021-05-02 NOTE — PLAN OF CARE
Problem: OXYGENATION/RESPIRATORY FUNCTION  Goal: Patient will maintain patent airway  5/2/2021 0929 by Garrett Quinonez RN  Outcome: Met This Shift  5/1/2021 2344 by Scarlet Boas, RN  Outcome: Ongoing     Problem: SKIN INTEGRITY  Goal: Skin integrity is maintained or improved  5/2/2021 0929 by Garrett Quinonez RN  Outcome: Met This Shift  5/1/2021 2344 by Scarlet Boas, RN  Outcome: Ongoing

## 2021-05-02 NOTE — PLAN OF CARE
Problem: OXYGENATION/RESPIRATORY FUNCTION  Goal: Patient will maintain patent airway  Outcome: Ongoing  Goal: Patient will achieve/maintain normal respiratory rate/effort  Description: Respiratory rate and effort will be within normal limits for the patient  Outcome: Ongoing     Problem: MECHANICAL VENTILATION  Goal: Patient will maintain patent airway  Outcome: Ongoing  Goal: Oral health is maintained or improved  Outcome: Ongoing  Goal: Tracheostomy will be managed safely  Outcome: Ongoing  Goal: ET tube will be managed safely  Outcome: Ongoing  Goal: Ability to express needs and understand communication  Outcome: Ongoing  Goal: Mobility/activity is maintained at optimum level for patient  Outcome: Ongoing     Problem: SKIN INTEGRITY  Goal: Skin integrity is maintained or improved  Outcome: Ongoing     Problem: NUTRITION  Goal: Nutritional status is improving  Outcome: Ongoing     Problem: Pain:  Goal: Pain level will decrease  Description: Pain level will decrease  Outcome: Ongoing  Goal: Control of acute pain  Description: Control of acute pain  Outcome: Ongoing  Goal: Control of chronic pain  Description: Control of chronic pain  Outcome: Ongoing
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
## 2021-05-02 NOTE — PROGRESS NOTES
called that pt is walking in the cafeteria downstairs. Pt has been educated previously on that he should not leave [facility] floor especially without letting staff know. He verbalized understanding previously. @5492 pt came back to the room with a bag of food. Educated him again. Pt states \" I know\". admitted with septic shock to micu requiring pressors. sec to uti e coli and klebsiella. adv dementia at baseline.     ATN resolved. Pneumomediastinum sec to tracheal perforation noted. no intervention per family.    DNR/ DNI Pt is an 86 y/o F w/ a PMHx of dementia, HTN, PPM, recurrent C diff infxn (finished course of Vanco ~7 days ago) x 6 episodes this year so far, A fib (on Eliquis) who was sent to the ED from [facility] . was admitted with septic shock to micu requiring pressors. sec to uti e coli and klebsiella. completed abxs iv ceftriaxone, will need also had ATN , now resolved. patient has hx of Urinary retention, h/o neuromuscular bladder dysfxn. renal US did not show any retention,  Bld c/s neg. also has hx of ckd 3.     patient had elevated trops likely due to atn/ ckd vs demand ischemia . deneis any cp.     patient noted to have Pneumomediastinum likely sec to tracheal perforation, family aware. patient has hx of advanced dementia , at baseline. needed Bronchoscopy, family did not     want any intervention done. family wishes patient to be DNR/ DNI and care and comfort. Bed bound at baseline.        has hx of Chronic C diff infection/ carrier.  no diarrhea    Pt on Eliquis for h/o A fib hx of s/p ablation.         Vital Signs Last 24 Hrs    T(C): 36.5 (27 Sep 2019 13:00), Max: 36.6 (26 Sep 2019 20:02)    T(F): 97.7 (27 Sep 2019 13:00), Max: 97.8 (26 Sep 2019 20:02)    HR: 70 (27 Sep 2019 13:00) (69 - 88)    BP: 135/79 (27 Sep 2019 13:00) (134/66 - 142/82)    BP(mean): --    RR: 18 (27 Sep 2019 13:00) (18 - 18)    SpO2: 95% (27 Sep 2019 13:00) (95% - 98%)        PHYSICAL EXAM-    GENERAL: no acute distress    NECK: supple     NERVOUS SYSTEM:  Alert & Oriented X 1- 2, Motor Strength 5/5 B/L upper and lower extremities    CHEST/LUNG: Clear to percussion bilaterally;     HEART: Regular rate and rhythm;     ABDOMEN: Soft, Nontender, Nondistended; Bowel sounds present        time spent for this dc 46 mins

## 2021-05-02 NOTE — PROGRESS NOTES
[facility] Progress Note    Admitting Date and Time: 5/1/2021  5:10 AM  Admit Dx: [facility] [J44.1]    Subjective:  Patient is being followed for [facility] exacerbation [facility] [J44.1]   Pt feels worsening congestion today, was hypoxic earlier    ROS: denies fever, chills, cp, n/v, HA unless stated above.       ipratropium-albuterol  1 ampule Inhalation Q4H WA    thiamine  100 mg Oral Daily    sodium chloride flush  5-40 mL Intravenous 2 times per day    enoxaparin  40 mg Subcutaneous Daily    lisinopril  20 mg Oral Daily    And    hydroCHLOROthiazide  12.5 mg Oral Daily    amoxicillin-clavulanate  1 tablet Oral 2 times per day    furosemide  20 mg Intravenous Daily    methylPREDNISolone  40 mg Intravenous Q8H     sodium chloride, 1 spray, Q4H PRN  albuterol, 1 ampule, Q6H PRN  sodium chloride flush, 5-40 mL, PRN  sodium chloride, 25 mL, PRN  promethazine, 12.5 mg, Q6H PRN    Or  ondansetron, 4 mg, Q6H PRN  polyethylene glycol, 17 g, Daily PRN  acetaminophen, 650 mg, Q6H PRN    Or  acetaminophen, 650 mg, Q6H PRN  benzonatate, 100 mg, TID PRN         Objective:    BP (!) 143/84   Pulse 82   Temp 98.4 °F (36.9 °C) (Oral)   Resp 20   Ht 6' 5\" (1.956 m)   Wt (!) 373 lb (169.2 kg)   SpO2 94%   BMI 44.23 kg/m²     General Appearance: alert and oriented to person, place and time and in no acute distress  Skin: warm and dry  Head: normocephalic and atraumatic  Eyes: pupils equal, round, and reactive to light, extraocular eye movements intact, conjunctivae normal  Neck: neck supple and non tender without mass   Pulmonary/Chest: wheezing bilaterally, rales or rhonchi, normal air movement, no respiratory distress  Cardiovascular: normal rate, normal S1 and S2 and no carotid bruits  Abdomen: soft, non-tender, non-distended, normal bowel sounds, no masses or organomegaly  Extremities: no cyanosis, no clubbing and +1 edema  Neurologic: no cranial nerve deficit and speech normal        Recent Labs     05/01/21  0001 05/02/21  0315    134   K 4.3 4.8    97*   CO2 31* 28   BUN 24* 24*   CREATININE 1.3* 1.1   GLUCOSE 106* 159*   CALCIUM 9.1 9.3       Recent Labs     05/01/21  0001 05/02/21  0315   WBC 9.8 17.3*   RBC 5.17 5.00   HGB 14.8 14.6   HCT 45.9 44.9   MCV 88.8 89.8   MCH 28.6 29.2   MCHC 32.2 32.5   RDW 13.0 13.2    229   MPV 8.7 9.0     Echo showed:   Technically suboptimal and limited study, patient would not permit   echocardiographic contrast.   Left ventricular size is grossly normal.   Adequate assessment of regional wall motion was not possible. Grossly normal left ventricular ejection fraction. Assessment:    Active Problems:    COPD exacerbation (HCC)  Resolved Problems:    * No resolved hospital problems. *      Plan:  1. Acute hypoxic respiratory failure, O2 sat droped to mid 80's, curretnly requiring 3L off and on, due to COPD exacerbation, treating the underlying process and wean off O2. Acute COPD exacerbation, started patient on steroids and DuoNeb's and monitor. 2.  Acute sinus infection and bronchitis, patient was started on Augmentin monitor. 3.  History of hypertension, blood pressure is not controlled, continue home lisinopril and hydrochlorothiazide  4. Edema dn orthopnea, echo reviewed, normal EF, continue lasix for now. NOTE: This report was transcribed using voice recognition software. Every effort was made to ensure accuracy; however, inadvertent computerized transcription errors may be present.   Electronically signed by Alondra Sanz MD on 5/2/2021 at 9:57 AM

## 2021-05-02 NOTE — PROGRESS NOTES
Pt requests to take a shower. Called Dr. Eben Mario and updated him on the walking test and pt has been noncompliant (left floor especiallywithout  letting staff know). Per Dr. Eben Mario pt can't take a shower with the concern that oxygen may drop in shower. Pt is made aware of 's decision. and he verbalizes understanding.

## 2021-05-02 NOTE — PROGRESS NOTES
Pt. States IV fell out. Refused new IV placement. Stated he would allow IV Team to place in morning.

## 2021-05-03 VITALS
HEART RATE: 88 BPM | BODY MASS INDEX: 37.19 KG/M2 | TEMPERATURE: 97.5 F | RESPIRATION RATE: 18 BRPM | DIASTOLIC BLOOD PRESSURE: 82 MMHG | OXYGEN SATURATION: 92 % | HEIGHT: 77 IN | WEIGHT: 315 LBS | SYSTOLIC BLOOD PRESSURE: 145 MMHG

## 2021-05-03 LAB
CULTURE, RESPIRATORY: NORMAL
L. PNEUMOPHILA SEROGP 1 UR AG: NORMAL
SMEAR, RESPIRATORY: NORMAL
STREP PNEUMONIAE ANTIGEN, URINE: NORMAL

## 2021-05-03 PROCEDURE — 6370000000 HC RX 637 (ALT 250 FOR IP): Performed by: INTERNAL MEDICINE

## 2021-05-03 PROCEDURE — 94660 CPAP INITIATION&MGMT: CPT

## 2021-05-03 PROCEDURE — 6360000002 HC RX W HCPCS: Performed by: INTERNAL MEDICINE

## 2021-05-03 PROCEDURE — 99239 HOSP IP/OBS DSCHRG MGMT >30: CPT | Performed by: INTERNAL MEDICINE

## 2021-05-03 PROCEDURE — 2580000003 HC RX 258: Performed by: INTERNAL MEDICINE

## 2021-05-03 PROCEDURE — 94640 AIRWAY INHALATION TREATMENT: CPT

## 2021-05-03 RX ORDER — IPRATROPIUM BROMIDE AND ALBUTEROL SULFATE 2.5; .5 MG/3ML; MG/3ML
3 SOLUTION RESPIRATORY (INHALATION)
Qty: 360 ML | Refills: 2 | Status: SHIPPED | OUTPATIENT
Start: 2021-05-03 | End: 2021-05-03

## 2021-05-03 RX ORDER — PREDNISONE 20 MG/1
40 TABLET ORAL DAILY
Status: DISCONTINUED | OUTPATIENT
Start: 2021-05-03 | End: 2021-05-03 | Stop reason: HOSPADM

## 2021-05-03 RX ORDER — IPRATROPIUM BROMIDE AND ALBUTEROL SULFATE 2.5; .5 MG/3ML; MG/3ML
3 SOLUTION RESPIRATORY (INHALATION)
Qty: 360 ML | Refills: 2 | Status: SHIPPED | OUTPATIENT
Start: 2021-05-03

## 2021-05-03 RX ORDER — BENZONATATE 100 MG/1
100 CAPSULE ORAL 3 TIMES DAILY PRN
Qty: 21 CAPSULE | Refills: 0 | Status: SHIPPED | OUTPATIENT
Start: 2021-05-03 | End: 2021-05-10

## 2021-05-03 RX ORDER — FUROSEMIDE 20 MG/1
20 TABLET ORAL DAILY
Qty: 60 TABLET | Refills: 3 | Status: SHIPPED | OUTPATIENT
Start: 2021-05-03

## 2021-05-03 RX ORDER — ALBUTEROL SULFATE 90 UG/1
2 AEROSOL, METERED RESPIRATORY (INHALATION) EVERY 6 HOURS PRN
Qty: 1 INHALER | Refills: 3 | Status: SHIPPED | OUTPATIENT
Start: 2021-05-03

## 2021-05-03 RX ORDER — AMOXICILLIN AND CLAVULANATE POTASSIUM 875; 125 MG/1; MG/1
1 TABLET, FILM COATED ORAL EVERY 12 HOURS SCHEDULED
Qty: 14 TABLET | Refills: 0 | Status: SHIPPED | OUTPATIENT
Start: 2021-05-03 | End: 2021-05-10

## 2021-05-03 RX ORDER — BENZONATATE 100 MG/1
100 CAPSULE ORAL 3 TIMES DAILY PRN
Qty: 21 CAPSULE | Refills: 0 | Status: SHIPPED | OUTPATIENT
Start: 2021-05-03 | End: 2021-05-03

## 2021-05-03 RX ORDER — PREDNISONE 10 MG/1
40 TABLET ORAL DAILY
Qty: 20 TABLET | Refills: 0 | Status: SHIPPED | OUTPATIENT
Start: 2021-05-03 | End: 2021-05-03 | Stop reason: SDUPTHER

## 2021-05-03 RX ORDER — FLUTICASONE PROPIONATE 50 MCG
2 SPRAY, SUSPENSION (ML) NASAL DAILY
Qty: 1 BOTTLE | Refills: 3 | Status: SHIPPED | OUTPATIENT
Start: 2021-05-04 | End: 2021-05-03

## 2021-05-03 RX ORDER — NICOTINE 21 MG/24HR
1 PATCH, TRANSDERMAL 24 HOURS TRANSDERMAL DAILY
Qty: 30 PATCH | Refills: 3 | Status: SHIPPED | OUTPATIENT
Start: 2021-05-04 | End: 2021-05-03

## 2021-05-03 RX ORDER — FLUTICASONE FUROATE AND VILANTEROL TRIFENATATE 100; 25 UG/1; UG/1
1 POWDER RESPIRATORY (INHALATION) DAILY
Qty: 1 EACH | Refills: 2 | Status: SHIPPED | OUTPATIENT
Start: 2021-05-03

## 2021-05-03 RX ORDER — NICOTINE 21 MG/24HR
1 PATCH, TRANSDERMAL 24 HOURS TRANSDERMAL DAILY
Qty: 30 PATCH | Refills: 3 | Status: SHIPPED | OUTPATIENT
Start: 2021-05-04 | End: 2022-08-23

## 2021-05-03 RX ORDER — PREDNISONE 10 MG/1
40 TABLET ORAL DAILY
Qty: 20 TABLET | Refills: 0 | Status: SHIPPED | OUTPATIENT
Start: 2021-05-03 | End: 2021-05-08

## 2021-05-03 RX ORDER — FLUTICASONE PROPIONATE 50 MCG
2 SPRAY, SUSPENSION (ML) NASAL DAILY
Qty: 1 BOTTLE | Refills: 3 | Status: SHIPPED | OUTPATIENT
Start: 2021-05-04

## 2021-05-03 RX ORDER — AMOXICILLIN AND CLAVULANATE POTASSIUM 875; 125 MG/1; MG/1
1 TABLET, FILM COATED ORAL EVERY 12 HOURS SCHEDULED
Qty: 14 TABLET | Refills: 0 | Status: SHIPPED | OUTPATIENT
Start: 2021-05-03 | End: 2021-05-03

## 2021-05-03 RX ORDER — FUROSEMIDE 20 MG/1
20 TABLET ORAL DAILY
Qty: 60 TABLET | Refills: 3 | Status: SHIPPED | OUTPATIENT
Start: 2021-05-03 | End: 2021-05-03 | Stop reason: SDUPTHER

## 2021-05-03 RX ADMIN — ENOXAPARIN SODIUM 40 MG: 40 INJECTION SUBCUTANEOUS at 09:35

## 2021-05-03 RX ADMIN — FLUTICASONE PROPIONATE 2 SPRAY: 50 SPRAY, METERED NASAL at 09:34

## 2021-05-03 RX ADMIN — SODIUM CHLORIDE, PRESERVATIVE FREE 10 ML: 5 INJECTION INTRAVENOUS at 10:29

## 2021-05-03 RX ADMIN — FUROSEMIDE 20 MG: 10 INJECTION, SOLUTION INTRAMUSCULAR; INTRAVENOUS at 09:34

## 2021-05-03 RX ADMIN — LISINOPRIL 20 MG: 20 TABLET ORAL at 09:34

## 2021-05-03 RX ADMIN — IPRATROPIUM BROMIDE AND ALBUTEROL SULFATE 1 AMPULE: .5; 3 SOLUTION RESPIRATORY (INHALATION) at 08:32

## 2021-05-03 RX ADMIN — Medication 100 MG: at 09:34

## 2021-05-03 RX ADMIN — METHYLPREDNISOLONE SODIUM SUCCINATE 40 MG: 40 INJECTION, POWDER, LYOPHILIZED, FOR SOLUTION INTRAMUSCULAR; INTRAVENOUS at 09:34

## 2021-05-03 RX ADMIN — AMOXICILLIN AND CLAVULANATE POTASSIUM 1 TABLET: 875; 125 TABLET, FILM COATED ORAL at 09:33

## 2021-05-03 RX ADMIN — HYDROCHLOROTHIAZIDE 12.5 MG: 12.5 TABLET ORAL at 09:34

## 2021-05-03 NOTE — DISCHARGE SUMMARY
[facility] Physician Discharge Summary       Oral Michael MD  [address]  [phone]    Call      Maureen Crook MD  [address] [facility] [address] [facility]  [phone]    Call        Activity level: As tolerated     Dispo: home      Condition on discharge: Stable     Patient ID:  Tobi Jernigan  [id number]  37 y.o.  1965    Admit date: 5/1/2021    Discharge date and time:  5/3/2021  5:40 PM    Admission Diagnoses: Active Problems:    COPD exacerbation ([address]  Resolved Problems:    * No resolved hospital problems. *      Discharge Diagnoses: Active Problems:    COPD exacerbation ([address]  Resolved Problems:    * No resolved hospital problems. *      Consults:  IP CONSULT TO RESPIRATORY CARE  IP CONSULT TO IV TEAM  IP CONSULT TO PULMONOLOGY  IP CONSULT TO PULMONOLOGY  IP CONSULT TO [address] [place] [address] O Course:   Patient Tobi Jernigan is a 50 y.o. presented with shortness of breath, found to be in acute hypoxic respiratory failure COPD exacerbation ([facility] [J44.1]      1. Acute hypoxic respiratory failure, O2 sat droped to mid 80's, curretnly requiring 4L off and on, due to COPD exacerbation, treating the underlying process and we will discharge the patient with improved symptoms on 4L of O2  2. Acute COPD exacerbation, started patient on steroids and DuoNeb's and monitor. Discharge on steroids nebs and albuterol, laso added breo  3. Acute sinus infection and bronchitis, patient was started on Augmentin monitor. 4.  History of hypertension, blood pressure is not controlled, continue home lisinopril and hydrochlorothiazide  5. Edema dn orthopnea, echo reviewed, normal EF, continue lasix for now.      Discharge Exam:  General Appearance: alert and oriented to person, place and time and in no acute distress  Skin: warm and dry  Head: normocephalic and atraumatic  Eyes: pupils equal, round, and reactive to light, extraocular eye movements intact, conjunctivae normal  Neck: neck supple and non tender without mass   Pulmonary/Chest: wheezing bilaterally, rales or rhonchi, normal air movement, no respiratory distress  Cardiovascular: normal rate, normal S1 and S2 and no carotid bruits  Abdomen: soft, non-tender, non-distended, normal bowel sounds, no masses or organomegaly  Extremities: no cyanosis, no clubbing and +1 edema  Neurologic: no cranial nerve deficit and speech normal    I/O last 3 completed shifts: In: 10 [I.V.:10]  Out: 500 [Urine:500]  No intake/output data recorded. LABS:  Recent Labs     05/01/21  0001 05/02/21  0315    134   K 4.3 4.8    97*   CO2 31* 28   BUN 24* 24*   CREATININE 1.3* 1.1   GLUCOSE 106* 159*   CALCIUM 9.1 9.3       Recent Labs     05/01/21  0001 05/02/21  0315   WBC 9.8 17.3*   RBC 5.17 5.00   HGB 14.8 14.6   HCT 45.9 44.9   MCV 88.8 89.8   MCH 28.6 29.2   MCHC 32.2 32.5   RDW 13.0 13.2    229   MPV 8.7 9.0       No results for input(s): POCGLU in the last 72 hours. Imaging:  Echo Complete    Result Date: 5/1/2021  Transthoracic Echocardiography Report (TTE)  Demographics   Patient Name       Jose Buitrago  Gender              Male                     S   Medical Record     [id number]       Room Number         7228  Number   Account #          [de-identified]      Procedure Date      05/01/2021   Corporate ID                      Ordering Physician  Gama Coburn MD   Accession Number   [id number]     Referring Physician   Date of Birth      1965     Sonographer         [address] [place] [address]   Age                50 year(s)     Interpreting        Yee Wilson MD                                    Physician                                     Any Other  Procedure Type of Study   TTE procedure:Echo Complete W/Doppler & Color Flow.   Procedure Date Date: 05/01/2021 Start: 12:42 PM Study Location: Portable Technical Quality: Limited visualization due to body habitus. Indications:Congestive heart failure. Patient Status: Routine Height: 77 inches Weight: 373 pounds BSA: 2.91 m^2 BMI: 44.23 kg/m^2 HR: 87 bpm BP: 161/79 mmHg  Findings   Left Ventricle  Left ventricular size is grossly normal.  Adequate assessment of regional wall motion was not possible. Grossly normal left ventricular ejection fraction. Indeterminate diastolic function. Right Ventricle  The right ventricle was not clearly visualized. Left Atrium  Left atrium was not clearly visualized. Right Atrium  Right atrium is not clearly visualized. Mitral Valve  The mitral valve was not well visualized. Tricuspid Valve  The tricuspid valve was not well visualized. Aortic Valve  The aortic valve leaflets were not well visualized. Pulmonic Valve  The pulmonic valve was not well visualized. Pericardial Effusion  No evidence of pericardial effusion. Aorta  Aortic root dimension within normal limits. Conclusions   Summary  Technically suboptimal and limited study, patient would not permit  echocardiographic contrast.  Left ventricular size is grossly normal.  Adequate assessment of regional wall motion was not possible. Grossly normal left ventricular ejection fraction.    Signature   ----------------------------------------------------------------  Electronically signed by Caryle Catalan MD(Interpreting  physician) on 05/01/2021 03:26 PM  ----------------------------------------------------------------  M-Mode/2D Measurements & Calculations   LV Diastolic    LV Systolic Dimension: 4.6   AV Cusp Separation: 2.2 cmLA  Dimension: 6.5  cm                           Dimension: 5 cmAO Root  cm              LV Volume Diastolic: 601.3   Dimension: 3.4 cm  LV FS:29.2 %    ml  LV PW           LV Volume Systolic: 46.7 ml  Diastolic: 1.2  LV EDV/LV EDV Index: 218.6  cm              ml/75 ml/m^2LV ESV/LV ESV    RV Diastolic Dimension: 4.2  LV PW Systolic: Index: 67.1 KP/55SP/ m^2     cm  1.1 cm EF Calculated: 56 %  Septum          LV Mass Index: 117 l/min*m^2 LA/Aorta: 9.88  Diastolic: 1.1                               Ascending Aorta: 3.1 cm  cm                                           LA volume/Index: 84.5 ml  Septum          LVOT: 2.3 cm                 /20.71RR/Q^0  Systolic: 1.4                                RA Area: 21.3 cm^2  cm  CO: 12.07 l/min  CI: 4.15  l/m*m^2  LV Mass: 339.1  g  Doppler Measurements & Calculations   MV Peak E-Wave: 0.87  AV Peak Velocity: 2.56 LVOT Peak Velocity: 1.65 m/s  m/s                   m/s                    LVOT Mean Velocity: 1.39 m/s  MV Peak A-Wave: 1.2   AV Peak Gradient:      LVOT Peak Gradient: 10.9  m/s                   26.17 mmHg             mmHgLVOT Mean Gradient: 8  MV E/A Ratio: 0.72    AV Mean Velocity: 1.87 mmHg  MV Peak Gradient: 7.7 m/s  mmHg                  AV Mean Gradient: 15.3  MV Mean Gradient: 4   mmHg  mmHg                  AV VTI: 44.9 cm  MV Mean Velocity:     AV Area  0.95 m/s              (Continuity):3.09 cm^2 PV Peak Velocity: 1.94 m/s  MV Deceleration Time:                        PV Peak Gradient: 15.09 mmHg  275.7 msec            LVOT VTI: 33.4 cm      PV Mean Velocity: 1.5 m/s  MV P1/2t: 83.4 msec   IVRT: 120 msec         PV Mean Gradient: 9.5 mmHg  MVA by PHT:2.64 cm^2  MV Area (continuity):  3.7 cm^2   MV E' Lateral  Velocity: 9 m/s  [email or web address]? [id number]    Cta Pulmonary W Contrast    Result Date: 5/1/2021  EXAMINATION: CTA OF THE CHEST 5/1/2021 12:39 am TECHNIQUE: CTA of the chest was performed after the administration of intravenous contrast.  Multiplanar reformatted images are provided for review. MIP images are provided for review. Dose modulation, iterative reconstruction, and/or weight based adjustment of the mA/kV was utilized to reduce the radiation dose to as low as reasonably achievable.  COMPARISON: Portable chest dated 04/30/2021 and CT dated 09/08/2020. HISTORY: ORDERING SYSTEM PROVIDED HISTORY: pe TECHNOLOGIST PROVIDED HISTORY: Reason for exam:->pe Decision Support Exception - unselect if not a suspected or confirmed emergency medical condition->Emergency Medical Condition (MA) Chest pain FINDINGS: The contrast bolus is somewhat heterogeneous and allowing for some mild patient motion, assessment of smaller peripheral pulmonary arteries is slightly limited. Allowing for these factors, no pulmonary embolism or aortic dissection identified. Severe bilateral gynecomastia. This is similar to previous. Normal-size lymph nodes without adenopathy by size criteria. Mild cardiomegaly. No pleural or pericardial effusion. Visualized portions of the upper abdomen demonstrate no acute abnormality. No pneumothorax. Scattered areas of scarring and/or atelectasis in the lung bases. No significant localized consolidation identified. Degenerative changes are scattered in the spine. No pulmonary embolism. Scattered areas of scarring and/or atelectasis in the lung bases. Mild cardiomegaly. Patient Instructions:      Medication List      START taking these medications    amoxicillin-clavulanate 875-125 MG per tablet  Commonly known as: AUGMENTIN  Take 1 tablet by mouth every 12 hours for 7 days     benzonatate 100 MG capsule  Commonly known as: TESSALON  Take 1 capsule by mouth 3 times daily as needed for Cough     Breo Ellipta 100-25 MCG/INH Aepb inhaler  Generic drug: fluticasone-vilanterol  Inhale 1 puff into the lungs daily     fluticasone 50 MCG/ACT nasal spray  Commonly known as: FLONASE  2 sprays by Each Nostril route daily  Start taking on: May 4, 2021     Full Kit Nebulizer Set Misc  Use as directed with nebulized medication.      furosemide 20 MG tablet  Commonly known as: Lasix  Take 1 tablet by mouth daily     ipratropium-albuterol 0.5-2.5 (3) MG/3ML Soln nebulizer solution  Commonly known as: DUONEB  Inhale 3 mLs into the lungs every 4 hours (while awake)     nicotine 14 MG/24HR  Commonly known as: NICODERM CQ  Place 1 patch onto the skin daily  Start taking on: May 4, 2021     predniSONE 10 MG tablet  Commonly known as: DELTASONE  Take 4 tablets by mouth daily for 5 days     sodium chloride 0.65 % nasal spray  Commonly known as: OCEAN, BABY AYR  1 spray by Nasal route every 4 hours as needed for Congestion        CHANGE how you take these medications    * albuterol 0.63 MG/3ML nebulizer solution  Commonly known as: ACCUNEB  What changed: Another medication with the same name was added. Make sure you understand how and when to take each. * albuterol sulfate  (90 Base) MCG/ACT inhaler  Commonly known as: Proventil HFA  Inhale 2 puffs into the lungs every 6 hours as needed for Wheezing or Shortness of Breath  What changed: You were already taking a medication with the same name, and this prescription was added. Make sure you understand how and when to take each. * This list has 2 medication(s) that are the same as other medications prescribed for you. Read the directions carefully, and ask your doctor or other care provider to review them with you.             CONTINUE taking these medications    lisinopril-hydroCHLOROthiazide 20-12.5 MG per tablet  Commonly known as: PRINZIDE;ZESTORETIC  Take 1 tablet by mouth daily     loratadine 10 MG capsule  Commonly known as: CLARITIN     thiamine 100 MG tablet  Take 1 tablet by mouth daily           Where to Get Your Medications      These medications were sent to [address] [place] [address], [address], [facility] [address]     Phone: [phone]   · albuterol sulfate  (90 Base) MCG/ACT inhaler  · amoxicillin-clavulanate 875-125 MG per tablet  · benzonatate 100 MG capsule  · Breo Ellipta 100-25 MCG/INH Aepb inhaler  · fluticasone 50 MCG/ACT nasal spray  · furosemide 20 MG tablet  · ipratropium-albuterol 0.5-2.5 (3) MG/3ML Soln nebulizer solution  · nicotine 14 MG/24HR  · predniSONE 10 MG tablet  · sodium chloride 0.65 % nasal spray     You can get these medications from any pharmacy    Bring a paper prescription for each of these medications  · Full Kit Nebulizer Set Misc           Note that more than 30 minutes was spent in preparing discharge papers, discussing discharge with patient, medication review, etc.    Signed:  Electronically signed by Kate Silva MD on 5/3/2021 at 5:40 PM

## 2021-05-03 NOTE — CARE COORDINATION
Social Work / Discharge Planning :SW met with patient and explained role as discharge planner/ transition of care. Patient admitted with COPD. Patient verified plan at discharge is HOME where he resides independently . Patient states he has transport home. Patient denies any HOME needs . Has C-pap. Patient PCP is DR Nikki Richardson and has [facility] community plan. Patient WILL NEED home 02 at 4 liters as well as nebulizer. SW called [employer]( only DME contracted with [facility] for 02 needs ) and left VM on [employer] phone. SW did print off orders and all info and faxed to [employer] [phone] and informed them to deliver portable to room. Patient is anxious to leave. AWait response form DME company in regards to 02 and Nebulizer. SW to follow. Electronically signed by ANEESH Bragg on 5/3/21 at 9:29 AM EDT      Addendum : Patient requesting for someone to come to his house for a day and clean and cook. SW explained insurance will not cover service but did provide patient with a list of Non -medical caretakers that can meet this need at a cost. Also, SW called [address] [place] [address] [phone]. They requested for SW to re-fax 02 and nebulizer order to [phone]. HI reinforced patient to be discharged today and please update SW when received. SW to follow. E.s    Addendum : Tami Santoyo from [address] did receive order and they will be out to deliver portable tank to patient room today. RN updated and DME information added to patient AVS. HI to follow.  Electronically signed by ANEESH Bragg on 5/3/21 at 11:48 AM EDT
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
## 2021-05-03 NOTE — PROGRESS NOTES
Pulse Ox room air @ rest: 90%  Pulse Ox room air with ambulation: 85%  Pulse Ox  ambulating with O2 on 4  liters. Via nasal cannula is 93 %. After ambulation @ rest for recovery on 95 liters of O2.

## 2021-05-03 NOTE — PROGRESS NOTES
Date: 5/2/2021    Time: 10:37 PM    Patient Placed On BIPAP/CPAP/ Non-Invasive Ventilation? Yes    If no must comment. Facial area red/color change? No           If YES are Blister/Lesion present? No   If yes must notify nursing staff  BIPAP/CPAP skin barrier? Yes    Skin barrier type:mepilexlite       Comments:Patient did not tolerate BiPAP order of 14/8 immediately ripped off mask stating it was too much pressure. Changed to CPAP +10 with Ramp option for patient comfort.           05/02/21 6880   NIV Type   $NIV $Daily Charge   Skin Assessment Clean, dry, & intact   Skin Protection for O2 Device Yes   Orientation Middle   Location Nose   NIV Started/Stopped On   Mode CPAP   Mask Type Full face mask   Mask Size Large   Settings/Measurements   CPAP/EPAP 10 cmH2O   Resp 12   FiO2  40 %   Vt Exhaled 665 mL   Minute Volume 7.6 Liters   Mask Leak (lpm) 84 lpm   Comfort Level Fair     [employer]

## 2021-05-03 NOTE — PROGRESS NOTES
Call placed to Dr Gavin Ellison to verify medications/inhalers for discharge, message left. Return call received, new orders given.

## 2021-05-03 NOTE — PROGRESS NOTES
Spoke to floor. Pt is locked into [facility] pharmacy on [address]. We are not able to fill meds at our pharmacy. Floor aware that they will need re sent to his pharmacy.

## 2021-05-03 NOTE — PROGRESS NOTES
Informed Dr. Walter Puckett pt refusing CPAP d/t feeling uncomfortable claustrophobic. No new orders.

## 2021-05-06 LAB
BLOOD CULTURE, ROUTINE: NORMAL
CULTURE, BLOOD 2: NORMAL

## 2021-05-06 NOTE — PROGRESS NOTES
[address] [facility]   Department of Internal Medicine   Internal Medicine Residency  MICU Progress Note    Patient:  Anthony Espinosa 48 y.o. male   MRN: [id number]       Date of Service: 9/10/2020    Allergy: Ativan [lorazepam] and Sulfa antibiotics    Subjective     Patient was seen and examined in AM. Intubated, sedated. PS. Agitated, ?withdrawal. Back on AC    24 hour change: Stable overnight. No acute overnight issues reported. Objective     TEMPERATURE:  Current - Temp: 98.2 °F (36.8 °C); Max - Temp  Av.2 °F (36.8 °C)  Min: 97.5 °F (36.4 °C)  Max: 98.8 °F (37.1 °C)  RESPIRATIONS RANGE: Resp  Av.3  Min: 12  Max: 31  PULSE RANGE: Pulse  Av.3  Min: 56  Max: 79  BLOOD PRESSURE RANGE:  Systolic (85FPV), SRE:391 , Min:104 , PQE:075   ; Diastolic (43AHQ), WUO:17, Min:52, Max:73    PULSE OXIMETRY RANGE: SpO2  Av.8 %  Min: 91 %  Max: 100 %    I & O - 24hr:    Intake/Output Summary (Last 24 hours) at 9/10/2020 1718  Last data filed at 9/10/2020 1400  Gross per 24 hour   Intake 5304 ml   Output 2875 ml   Net 2429 ml     I/O last 3 completed shifts: In: 8660 [I.V.:5024; NG/GT:180; IV Piggyback:100]  Out: 3500 [Urine:3050; Emesis/NG output:450] No intake/output data recorded. Weight change: -4 lb 6.6 oz (-2 kg)    Physical Exam:  General Appearance:    Obese; intubated   HEENT:    NC/AT, mucous membranes are moist   Neck:   Supple, no jugular venous distention. Resp:     CTAB, No wheezes, No rhonchi, no use of accessory muscles   Heart:    RRR, S1 and S2 normal, no murmur, rub or gallop.     Abdomen:     Soft, non-tender, non-distended with normal bowel sounds   Extremities:   Atraumatic, no cyanosis or edema   Pulses:  Radial and pedal pulses are intact bilaterally   Neurologic:  sedated       Medications     Continuous Infusions:   dextrose 5 % and 0.45 % NaCl 100 mL/hr at 09/10/20 0940    propofol 10 mcg/kg/min (09/10/20 1532)    fentaNYL 5 mcg/ml in 0.9%  ml infusion 100 mcg/hr (09/10/20 1530)    dextrose       Scheduled Meds:   melatonin ER  2 mg Oral Nightly    QUEtiapine  50 mg Oral BID    ipratropium-albuterol  1 ampule Inhalation Q4H WA    methylPREDNISolone  80 mg Intravenous Daily    sodium chloride flush  10 mL Intravenous 2 times per day    chlorhexidine  15 mL Mouth/Throat BID    famotidine (PEPCID) injection  20 mg Intravenous BID    folic acid  1 mg Intravenous Daily    thiamine  100 mg Intravenous Daily    ampicillin-sulbactam  3 g Intravenous Q6H    apixaban  5 mg Oral BID    insulin lispro  0-6 Units Subcutaneous TID     insulin lispro  0-3 Units Subcutaneous Nightly     PRN Meds: [Held by provider] buprenorphine **AND** cloNIDine, hydrOXYzine, polyethylene glycol, sodium chloride flush, midazolam, albuterol, acetaminophen **OR** acetaminophen, promethazine **OR** ondansetron, glucose, dextrose, glucagon (rDNA), dextrose  Nutrition:   NG/OG tube TF type: Pulmocare/Nephro/Glucerna/Jevity        At rate: ml/h    Labs and Imaging Studies     CBC:   Recent Labs     09/08/20  2335 09/09/20  0409 09/10/20  0354   WBC 12.0* 10.2 10.3   HGB 13.7 14.2 13.4   HCT 43.8 44.7 42.1   MCV 85.0 84.8 85.2    226 215       BMP:    Recent Labs     09/08/20  2335 09/09/20  0409 09/10/20  0354    140 139   K 3.7 3.9 3.4*   CL 98 101 104   CO2 28 29 25   BUN 14 14 11   CREATININE 1.2 1.2 1.2   GLUCOSE 98 122* 139*       LIVER PROFILE:   Recent Labs     09/08/20  2335 09/09/20  0409 09/10/20  0354   AST 20 20 13   ALT 15 15 13   BILITOT 0.6 0.7 0.4   ALKPHOS 56 58 48       PT/INR:   No results for input(s): PROTIME, INR in the last 72 hours. APTT:   No results for input(s): APTT in the last 72 hours.     Fasting Lipid Panel:    Lab Results   Component Value Date    CHOL 230 02/07/2017    TRIG 383 02/07/2017    HDL 37 02/07/2017       Cardiac Enzymes:    Lab Results   Component Value Date    CKTOTAL 226 (H) 07/09/2017    TROPONINI <0.01 09/08/2020    TROPONINI <0.01 05/23/2020    TROPONINI <0.01 07/27/2019       Notable Cultures:      Blood cultures   Blood Culture, Routine   Date Value Ref Range Status   09/08/2020 24 Hours no growth  Preliminary     Respiratory cultures No results found for: RESPCULTURE No results found for: LABGRAM  Urine   Urine Culture, Routine   Date Value Ref Range Status   09/08/2020 Growth not present, incubation continues  Preliminary     Legionella No results found for: LABLEGI  C Diff PCR No results found for: CDIFPCR  Wound culture/abscess: No results for input(s): WNDABS in the last 72 hours. Tip culture:No results for input(s): CXCATHTIP in the last 72 hours.      Antibiotic  Days  Day started                                Oxygen:     Vent Information  $Ventilation: $Subsequent Day  Equipment ID: [id number]  Vent Type: 980  Vent Mode: AC/VC  Vt Ordered: 450 mL  Rate Set: 30 bmp  Peak Flow: 65 L/min  Pressure Support: 0 cmH20  FiO2 : 90 %  SpO2: 93 %  SpO2/FiO2 ratio: 103.33  PaO2/FiO2 ratio: 0.7  Sensitivity: 3  PEEP/CPAP: 14  I Time/ I Time %: 0 s  Humidification Source: Heated wire  Humidification Temp: 37  Humidification Temp Measured: 37  Circuit Condensation: Drained  Additional Respiratory  Assessments  Pulse: 75  Resp: 30  SpO2: 93 %  $End Tidal CO2: 69  Position: Semi-Soto's  Humidification Source: Heated wire  Humidification Temp: 37  Circuit Condensation: Drained  Oral Care: Mouth swabbed, Mouth suctioned  Subglottic Suction Done?: Yes  Airway Type: ET  Airway Size: 8(28)  Cuff Pressure (cm H2O): 29 cm H2O     Nasal cannula L/min     Face mask %     Reservoirs mask %       ABG   Vent Settings     PH   Mode      PCO2   TV      PO2   RR      HCO3   PS      Sat%   PEEP      FIO2   FIO2        P/F          Lines:  Site  Day  Date inserted     TLC              PICC              Arterial line              Peripheral line              HD cath            [REMOVED] External Urinary Catheter-Output (mL): 0 mL  Urethral Catheter-Output (mL): 150 mL  [REMOVED] Urethral Catheter Temperature probe;Non-latex 16 fr-Output (mL): 75 mL    Imaging Studies:    Ct Head Wo Contrast    Result Date: 2020  Patient MRN: [id number] : 1965 Age:  48 years Gender: Male Order Date: 2020 6:43 PM Exam: CT HEAD WO CONTRAST Number of Images: 324 views Indication:  Acute drug overdose with diminished responsiveness. Altered mental status Comparison: 2019 TECHNIQUE: Region of study: Head. CT images acquired without intravenous contrast. One or more of these dose optimization techniques were utilized: Automated exposure control; mA and/or kV adjustment per patient size (includes targeted exams where dose is matched to clinical indication); or iterative reconstruction. FINDINGS: No mass, hemorrhage or midline shift. Ventricles and sulci normal. Bony calvarium unremarkable. There are suspected nasal polyps. Unremarkable brain. Suspected nasal polyps. Ct Chest Wo Contrast    Result Date: 2020  Patient Name:  Ester Jarrett Patient MRN:  [id number] Patient :  1965 Patient Age:  48 years Patient Gender:  Male Order Date:2020 6:54 PM EXAM:  CT CHEST WO CONTRAST NUMBER OF IMAGES:  106 INDICATION:   SOB SOB COMPARISON: 2018. Technique: Multiple axial images were obtained from the apices of the lungs through the lung bases. Sagittal and coronal reconstructions performed for aid in interpretation of the study. FINDINGS: Lungs: Bibasilar consolidations. Airway: Endotracheal tube tip terminates within the mid thoracic trachea. Heart/Vasculature: Mild coronary artery atherosclerotic calcification. Mediastinum: Unremarkable. Pleura: Unremarkable. Osseous structures/soft tissue: Unremarkable. Upper abdomen: Nasogastric tube within the stomach. Other: Bilateral gynecomastia. Bibasilar consolidations likely infectious/inflammatory in etiology. Recommend dedicated chest CT in 3 months to demonstrate resolution.      Xr Chest Portable    Result Date: 2020  Patient MRN:  [id number] : 1965 Age: 48 years Gender: Male Order Date:  2020 6:45 AM EXAM: XR CHEST PORTABLE one image INDICATION:  PNA PNA COMPARISON: 2020 FINDINGS: There is cardiomegaly. Life-support lines and catheters are unchanged. There is persistent perihilar and bibasilar atelectasis/infiltrates and pleural effusions. Stable abnormal chest with unchanged bibasilar infiltrates/atelectasis and pleural effusions which may be due to pneumonia or edema. Xr Chest Portable    Result Date: 2020  Patient MRN:  [id number] : 1965 Age: 48 years Gender: Male Order Date:  2020 3:35 AM EXAM: XR CHEST PORTABLE INDICATION:  PNA PNA COMPARISON: 2020 FINDINGS:  There is been partial clearing of right-sided infiltrates since the prior study. Left lower lobe infiltrate is unchanged. NG tube is appropriate. There are no significant effusions. Upper lobes are normal.     Partial clearing of right-sided infiltrates     Xr Chest Portable    Result Date: 2020  Patient MRN:  [id number] : 1965 Age: 48 years Gender: Male Order Date:  2020 5:20 PM EXAM: XR ABDOMEN FOR NG/OG/NE TUBE PLACEMENT, XR CHEST PORTABLE NUMBER OF IMAGES:  2 view(s) INDICATION:  Confirmation of course of NG/OG/NE tube and location of tip of tube Confirmation of course of NG/OG/NE tube and location of tip of tube Portable? ->Yes COMPARISON: Single view chest dated 2020, 1508 hours. FINDINGS: Endotracheal tube is just beyond the thoracic inlet. Nasogastric tube is in the stomach. There is somewhat increasing airspace disease on the right and there is infiltrate and/or atelectasis on the left which is not greatly changed. No active process in the upper abdomen is demonstrated. The pelvis was not imaged. Status post endotracheal and nasogastric intubation as noted. Increasing airspace disease on the right.      Xr Chest Portable    Result Date: 2020  Patient MRN: [id number] : 1965 Age:  48 years Gender: Male Order Date: 2020 3:16 PM Exam: XR CHEST PORTABLE Number of Images: 1 view Indication:   shortness of breath Comparison: 23 May 2020 FINDINGS: Suboptimal inspiration. Crowding of bronchovascular markings in this case may mimic congestive failure. Consider 2 view PA and lateral chest for more optimal evaluation. Neither costophrenic angle is blunted. Suboptimal inspiration which in this case may mimic congestive failure. Consider a two-view study. Xr Abdomen For Ng/og/ne Tube Placement    Result Date: 2020  Patient MRN:  [id number] : 1965 Age: 48 years Gender: Male Order Date:  2020 5:20 PM EXAM: XR ABDOMEN FOR NG/OG/NE TUBE PLACEMENT, XR CHEST PORTABLE NUMBER OF IMAGES:  2 view(s) INDICATION:  Confirmation of course of NG/OG/NE tube and location of tip of tube Confirmation of course of NG/OG/NE tube and location of tip of tube Portable? ->Yes COMPARISON: Single view chest dated 2020, 1508 hours. FINDINGS: Endotracheal tube is just beyond the thoracic inlet. Nasogastric tube is in the stomach. There is somewhat increasing airspace disease on the right and there is infiltrate and/or atelectasis on the left which is not greatly changed. No active process in the upper abdomen is demonstrated. The pelvis was not imaged. Status post endotracheal and nasogastric intubation as noted. Increasing airspace disease on the right. Resident's Assessment and Plan     Assessment:      1. Acute hypercapnic and hypoxic respiratory failure: 2/2 aspiration PNA and  respiratory depression from drug overdose, and aspiration pneumonia/pneumonitis. Poor response to Oxygen is suggestive of a shunt. 2. Acute encephalopathy 2/2 drug abuse and hypercapnic respiratory failure. UDS +ve for polysubstance  3. Aspiration PNA 2/2 AMS, polysubstance abuse  4. Ho Obstructive sleep apnea. 5. Ho COPD. 6. Ho Hyperlipidemia  7. Ho Hypertension.   8. Ho History of unprovoked pulmonary embolism: On Eliquis 5 mg twice daily. 9. Ho Tobacoo abuse   10. Ho Polysubstance abuse     Plan:     · Mechanically ventilated on AC/VC mode, Daily ABGs and CXR. · Titrate tidal volume according to pH  · Increase PEEP for target PO2 more than 55-60. · Weaning sedation  · starting Opioid withdrawal protocol with Buprenorphine and clonidine  · Started Seroquel 50 bid  · Continue Unasyn   · Hold Antihypertensive medications  · Hold Depression medications till further evaluation. · Concern for suicidal attempt per the sister due to uncontrolled depression. # Peptic ulcer prophylaxis: Pepcid  # DVT Prophylaxis: Eliquis  # Disposition: Cont current care     [address] DO, PGY-2  Internal medicine resident  Attending Physician: Dr. Pedrito Urrutia      I personally saw, examined and provided care for the patient. Radiographs, labs and medication list were reviewed by me independently. I spoke with bedside nursing, therapists and consultants. Critical care services and times documented are independent of procedures and multidisciplinary rounds with Residents. Additionally comprehensive, multidisciplinary rounds were conducted with the MICU team. The case was discussed in detail and plans for care were established. Review of Residents documentation was conducted and revisions were made as appropriate. I agree with the above documented exam, problem list and plan of care. Abx   [place] to mnitor bp  Sedation for agitation and withdraw  psv trial as tolerated   Florentino Mcnair M.D.    Pulmonary/Critical Care Medicine   38 min cct excluding procedures no

## 2021-06-24 ENCOUNTER — HOSPITAL ENCOUNTER (OUTPATIENT)
Dept: SLEEP CENTER | Age: 56
Discharge: HOME | End: 2021-06-24
Payer: MEDICAID

## 2021-06-24 DIAGNOSIS — G47.33 OSA (OBSTRUCTIVE SLEEP APNEA): Primary | ICD-10-CM

## 2021-06-24 PROCEDURE — 95810 POLYSOM 6/> YRS 4/> PARAM: CPT

## 2021-06-25 VITALS
HEIGHT: 78 IN | SYSTOLIC BLOOD PRESSURE: 159 MMHG | HEART RATE: 82 BPM | WEIGHT: 315 LBS | BODY MASS INDEX: 36.45 KG/M2 | DIASTOLIC BLOOD PRESSURE: 95 MMHG | OXYGEN SATURATION: 87 %

## 2021-06-25 ASSESSMENT — SLEEP AND FATIGUE QUESTIONNAIRES
HOW LIKELY ARE YOU TO NOD OFF OR FALL ASLEEP WHILE LYING DOWN TO REST IN THE AFTERNOON WHEN CIRCUMSTANCES PERMIT: 3
HOW LIKELY ARE YOU TO NOD OFF OR FALL ASLEEP WHILE SITTING QUIETLY AFTER LUNCH WITHOUT ALCOHOL: 0
HOW LIKELY ARE YOU TO NOD OFF OR FALL ASLEEP WHEN YOU ARE A PASSENGER IN A CAR FOR AN HOUR WITHOUT A BREAK: 1
HOW LIKELY ARE YOU TO NOD OFF OR FALL ASLEEP WHILE WATCHING TV: 3
ESS TOTAL SCORE: 12
HOW LIKELY ARE YOU TO NOD OFF OR FALL ASLEEP IN A CAR, WHILE STOPPED FOR A FEW MINUTES IN TRAFFIC: 0
HOW LIKELY ARE YOU TO NOD OFF OR FALL ASLEEP WHILE SITTING INACTIVE IN A PUBLIC PLACE: 2
HOW LIKELY ARE YOU TO NOD OFF OR FALL ASLEEP WHILE SITTING AND READING: 3
HOW LIKELY ARE YOU TO NOD OFF OR FALL ASLEEP WHILE SITTING AND TALKING TO SOMEONE: 0

## 2021-06-30 NOTE — PROGRESS NOTES
[id number] [address]                               SLEEP STUDY REPORT    PATIENT NAME: Ronal Escalante                    :        1965  MED REC NO:   [id number]                            ROOM:  ACCOUNT NO:   [de-identified]                           ADMIT DATE: 2021  PROVIDER:     Leighann Calvert MD    DATE OF STUDY:  2021    INDICATION FOR STUDY:  Evaluation for NATALI, the patient with history of  NATALI and noncompliance in the past.    MEDICATIONS:  Include use of lisinopril/hydrochlorothiazide, albuterol,  Eliquis, Flonase, gabapentin, methadone, prednisone, Stiolto, tramadol,  and nicotine patches. PAST MEDICAL HISTORY:  Significant for COPD, hypertension, arthritis,  chronic pain, depression, claustrophobia, anxiety, and morbid obesity  with a BMI of 42.5. Neck circumference is 20 inches and Foley score  12/24. This study was performed with the recording of bilateral six EEGs per  sleep apnea protocol, bilateral EOGs, bilateral chin EMGs, bilateral  anterior tibialis EMGs, chest and abdominal movements, continuous pulse  oximetry and EKGs, nasal pressure and airflows. Moderate-to-loud  snoring was recorded during this test.    SLEEP ARCHITECTURE:  Total recording time was 359 minutes, total sleep  time 203 minutes, with a reduced sleep efficiency of 56%. The patient  was able to fall asleep fairly quick within 55 minutes; however, has  increased number of arousals as at 154 minutes awake after sleep onset. REM latency was 238 minutes, which was seen pretty much at the end of  the test.    SLEEP STAGES:  15% in stage I, 70% in stage II, 0% delta sleep, and 14%  REM sleep. RESPIRATORY EVENTS:  The patient does not have central apneas.   The  patient has 181 episodes of obstructive apneas and 149 episodes of  obstructive hypopneas with a mean duration of 19 seconds, the longest  event was 81 seconds. A combined apnea-hypopnea index was 97.6. Increased number of events seen during REM sleep and as well no REM  sleep. The patient slept in the supine position throughout the test.    AROUSALS:  491 episodes of arousals for index of 56 and majority of  these arousals were due to respiratory events and spontaneous. LIMB MOVEMENTS:  The patient did not have independent limb movements. No PLM series. OXYGEN SATURATION:  Oxygen saturation fluctuated between 96% to 63% with  a mean sat of 85%. The lowest saturation was 63% and the patient spent  205 minutes with saturation equal or less than 88%. Heart rate  fluctuated between 104 to 61 beats per minute with a mean heart rate of  79 beats per minute. In summary, the patient presented with the followin. Quite decreased sleep efficiency, which could be associated to the  use of medication as well as underlying mood disorders and underlying  severe sleep-disordered breathing. 2.  Moderate-to-loud snoring. 3.  Severe NATALI. 4.  Nocturnal hypoxemia associated to underlying sleep-disordered  breathing; however, the hypoxemia is out of the proportion as well, most  likely related to morbid obesity and underlying COPD. Considering the  above findings, the patient will benefit of in-lab CPAP titration, may  need supplemental O2.         Lance Salamanca MD    D: 2021 11:37:17       T: 2021 15:02:10     [id number]  Job#: [id number]     Doc#: [id number]

## 2021-11-15 ENCOUNTER — HOSPITAL ENCOUNTER (OUTPATIENT)
Dept: SLEEP CENTER | Age: 56
Discharge: HOME | End: 2021-11-15
Payer: MEDICAID

## 2021-11-15 DIAGNOSIS — I10 HYPERTENSION, UNSPECIFIED TYPE: Primary | ICD-10-CM

## 2021-11-15 DIAGNOSIS — G47.33 OSA (OBSTRUCTIVE SLEEP APNEA): ICD-10-CM

## 2021-11-15 DIAGNOSIS — J44.1 COPD EXACERBATION (HCC): ICD-10-CM

## 2021-11-15 PROCEDURE — 95811 POLYSOM 6/>YRS CPAP 4/> PARM: CPT

## 2021-11-15 PROCEDURE — 2700000000 HC OXYGEN THERAPY PER DAY

## 2021-11-16 VITALS
HEART RATE: 89 BPM | OXYGEN SATURATION: 96 % | WEIGHT: 315 LBS | HEIGHT: 77 IN | SYSTOLIC BLOOD PRESSURE: 163 MMHG | DIASTOLIC BLOOD PRESSURE: 96 MMHG | BODY MASS INDEX: 37.19 KG/M2

## 2021-12-01 NOTE — PROGRESS NOTES
[id number] [address]                               SLEEP STUDY REPORT    PATIENT NAME: Katie Mccabe                    :        1965  MED REC NO:   [id number]                            ROOM:  ACCOUNT NO:   [de-identified]                           ADMIT DATE: 11/15/2021  PROVIDER:     Cade Padilla MD    DATE OF STUDY:  11/15/2021    The patient's provider is Cesar Rendon NP.    PRIMARY CARE PROVIDER:  Bhavani Whitten MD.    This is a CPAP titration. AGE:  53-year-old. Height 77 inches. Weighs 285 pounds with a BMI of  45.6. MEDICATIONS:  Lisinopril, hydrochlorothiazide, albuterol, Eliquis,  Flonase, gabapentin, methadone, prednisone,  tramadol, and  nicotine patches. INDICATIONS:  Known obstructive sleep apnea. Diagnostic study on 2021 showed apnea/hypopnea index of 97.6 with  lowest saturation of 63%. The [place] Sleepiness Scale is 12/24. Nocturnal polysomnography was done utilizing a standard six EEG sleep  apnea protocol. Raw data was reviewed in detail. Total recording time was 360 minutes, out of which total sleep time was  344 minutes resulting in a sleep efficiency of 96%. The patient had a  sleep onset of 1.6 minutes and was awake for 14 minutes after sleep  onset. REM latency was very short at 31 minutes. The patient spent 3%  of the study in N1 sleep, 57 in N2 sleep, and 40% in REM sleep. There  was no delta sleep noted. SLEEP-DISORDERED BREATHING:  Using the 4% criteria as per CMS guidelines, the patient had 81 central apneas, 3 obstructive apneas, and 1 mixed apnea. The central apneas can be due to the patient being on tramadol. Total apnea/hypopnea index was 14.8. The patient had 154  hypopneas for an hypopnea index of 56.9. Total apnea/hypopnea index was 42.7. These events lasted for 15 seconds to 41 seconds in duration. REM index was elevated at 22.

## 2022-08-22 ENCOUNTER — TELEPHONE (OUTPATIENT)
Dept: VASCULAR SURGERY | Age: 57
End: 2022-08-22

## 2022-08-23 ENCOUNTER — OFFICE VISIT (OUTPATIENT)
Dept: VASCULAR SURGERY | Age: 57
End: 2022-08-23
Payer: MEDICAID

## 2022-08-23 DIAGNOSIS — M79.604 LEG PAIN, BILATERAL: Primary | ICD-10-CM

## 2022-08-23 DIAGNOSIS — M79.605 LEG PAIN, BILATERAL: Primary | ICD-10-CM

## 2022-08-23 PROCEDURE — 1036F TOBACCO NON-USER: CPT | Performed by: SURGERY

## 2022-08-23 PROCEDURE — G8417 CALC BMI ABV UP PARAM F/U: HCPCS | Performed by: SURGERY

## 2022-08-23 PROCEDURE — 3017F COLORECTAL CA SCREEN DOC REV: CPT | Performed by: SURGERY

## 2022-08-23 PROCEDURE — 99203 OFFICE O/P NEW LOW 30 MIN: CPT | Performed by: SURGERY

## 2022-08-23 PROCEDURE — G8427 DOCREV CUR MEDS BY ELIG CLIN: HCPCS | Performed by: SURGERY

## 2022-08-23 RX ORDER — ASPIRIN 81 MG/1
81 TABLET ORAL DAILY
COMMUNITY
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
## 2023-03-21 ENCOUNTER — HOSPITAL ENCOUNTER (OUTPATIENT)
Dept: GENERAL RADIOLOGY | Age: 58
Discharge: HOME | End: 2023-03-23
Payer: MEDICAID

## 2023-03-21 ENCOUNTER — HOSPITAL ENCOUNTER (OUTPATIENT)
Age: 58
Discharge: HOME | End: 2023-03-23
Payer: MEDICAID

## 2023-03-21 DIAGNOSIS — F17.200 NICOTINE DEPENDENCE WITH CURRENT USE: ICD-10-CM

## 2023-03-21 PROCEDURE — 71046 X-RAY EXAM CHEST 2 VIEWS: CPT

## 2023-10-30 ENCOUNTER — TELEPHONE (OUTPATIENT)
Dept: CASE MANAGEMENT | Age: 58
End: 2023-10-30

## 2023-10-30 NOTE — TELEPHONE ENCOUNTER
I called the patient and he confirmed his CT lung screening at [facility] on 10/31/2023 at 5:30 pm.  I reminded the patient to arrive at 5:00 pm, enter through the main entrance, and register. Patient confirmed.             Electronically signed by Karena Camilo on 10/30/23 at 2:18 PM EDT

## 2023-10-31 ENCOUNTER — HOSPITAL ENCOUNTER (OUTPATIENT)
Dept: CT IMAGING | Age: 58
Discharge: HOME | End: 2023-11-02
Payer: MEDICAID

## 2023-10-31 DIAGNOSIS — F17.200 TOBACCO USE DISORDER: ICD-10-CM

## 2023-10-31 DIAGNOSIS — N64.52 NIPPLE DISCHARGE: ICD-10-CM

## 2023-10-31 DIAGNOSIS — F17.210 CIGARETTE SMOKER: ICD-10-CM

## 2023-10-31 DIAGNOSIS — Z12.2 SCREENING FOR MALIGNANT NEOPLASM OF RESPIRATORY ORGAN: ICD-10-CM

## 2023-10-31 PROCEDURE — 71271 CT THORAX LUNG CANCER SCR C-: CPT

## 2023-11-01 ENCOUNTER — TELEPHONE (OUTPATIENT)
Dept: CASE MANAGEMENT | Age: 58
End: 2023-11-01

## 2023-11-01 NOTE — TELEPHONE ENCOUNTER
No call, encounter opened to process CT Lung Screening. CT Lung Screen: 10/31/2023      IMPRESSION:  1. There is no pulmonary infiltrate, mass or suspicious pulmonary nodule  2. Emphysematous changes     LUNG RADS:  Lung-RADS 1 - Negative ()     Management:  12 month screening LDCT     RECOMMENDATIONS:  If you would like to register your patient with the [facility], please contact the Nurse Navigator at  [phone]. Pack years: 32    Social History     Tobacco Use  Smoking Status: Current Every Day Smoker    Start Date:    Quit Date:    Types: Cigarettes   Packs/Day: 1   Years: 32   Pack Years: 32   Smokeless Tobacco: Former         Results letter sent to patient via my chart or mailed.      [address]

## 2023-11-22 ENCOUNTER — TELEPHONE (OUTPATIENT)
Dept: GENERAL RADIOLOGY | Age: 58
End: 2023-11-22

## 2024-03-28 ENCOUNTER — HOSPITAL ENCOUNTER (OUTPATIENT)
Dept: GENERAL RADIOLOGY | Age: 59
End: 2024-03-28
Payer: MEDICAID

## 2024-03-28 ENCOUNTER — HOSPITAL ENCOUNTER (OUTPATIENT)
Dept: GENERAL RADIOLOGY | Age: 59
Discharge: HOME | End: 2024-03-30
Payer: MEDICAID

## 2024-03-28 VITALS — WEIGHT: 315 LBS | BODY MASS INDEX: 38.36 KG/M2 | HEIGHT: 76 IN

## 2024-03-28 DIAGNOSIS — N64.52 NIPPLE DISCHARGE: ICD-10-CM

## 2024-03-28 PROCEDURE — G0279 TOMOSYNTHESIS, MAMMO: HCPCS

## 2024-05-17 ENCOUNTER — APPOINTMENT (OUTPATIENT)
Dept: ULTRASOUND IMAGING | Age: 59
End: 2024-05-17
Payer: MEDICAID

## 2024-05-17 ENCOUNTER — HOSPITAL ENCOUNTER (EMERGENCY)
Age: 59
Discharge: HOME | End: 2024-05-17
Payer: MEDICAID

## 2024-05-17 VITALS
WEIGHT: 315 LBS | HEIGHT: 75 IN | DIASTOLIC BLOOD PRESSURE: 98 MMHG | SYSTOLIC BLOOD PRESSURE: 152 MMHG | BODY MASS INDEX: 39.17 KG/M2 | TEMPERATURE: 98.3 F | OXYGEN SATURATION: 95 % | HEART RATE: 95 BPM | RESPIRATION RATE: 18 BRPM

## 2024-05-17 DIAGNOSIS — L03.116 CELLULITIS OF LEFT LOWER EXTREMITY: ICD-10-CM

## 2024-05-17 DIAGNOSIS — I82.462 DEEP VEIN THROMBOSIS (DVT) OF CALF MUSCLE VEIN OF LEFT LOWER EXTREMITY, UNSPECIFIED CHRONICITY (HCC): ICD-10-CM

## 2024-05-17 DIAGNOSIS — M79.604 PAIN IN BOTH LOWER EXTREMITIES: Primary | ICD-10-CM

## 2024-05-17 DIAGNOSIS — M79.605 PAIN IN BOTH LOWER EXTREMITIES: Primary | ICD-10-CM

## 2024-05-17 LAB
ALBUMIN SERPL-MCNC: 3.7 G/DL (ref 3.5–5.2)
ALP SERPL-CCNC: 78 U/L (ref 40–129)
ALT SERPL-CCNC: 24 U/L (ref 0–40)
ANION GAP SERPL CALCULATED.3IONS-SCNC: 7 MMOL/L (ref 7–16)
AST SERPL-CCNC: 22 U/L (ref 0–39)
BASOPHILS # BLD: 0.04 K/UL (ref 0–0.2)
BASOPHILS NFR BLD: 0 % (ref 0–2)
BILIRUB SERPL-MCNC: 0.6 MG/DL (ref 0–1.2)
BUN SERPL-MCNC: 15 MG/DL (ref 6–20)
CALCIUM SERPL-MCNC: 9.3 MG/DL (ref 8.6–10.2)
CHLORIDE SERPL-SCNC: 100 MMOL/L (ref 98–107)
CO2 SERPL-SCNC: 31 MMOL/L (ref 22–29)
CREAT SERPL-MCNC: 1 MG/DL (ref 0.7–1.2)
EOSINOPHIL # BLD: 0.52 K/UL (ref 0.05–0.5)
EOSINOPHILS RELATIVE PERCENT: 5 % (ref 0–6)
ERYTHROCYTE [DISTWIDTH] IN BLOOD BY AUTOMATED COUNT: 13.9 % (ref 11.5–15)
GFR, ESTIMATED: >90 ML/MIN/1.73M2
GLUCOSE SERPL-MCNC: 123 MG/DL (ref 74–99)
HCT VFR BLD AUTO: 49.4 % (ref 37–54)
HGB BLD-MCNC: 16.1 G/DL (ref 12.5–16.5)
IMM GRANULOCYTES # BLD AUTO: 0.04 K/UL (ref 0–0.58)
IMM GRANULOCYTES NFR BLD: 0 % (ref 0–5)
LYMPHOCYTES NFR BLD: 1.72 K/UL (ref 1.5–4)
LYMPHOCYTES RELATIVE PERCENT: 16 % (ref 20–42)
MCH RBC QN AUTO: 29 PG (ref 26–35)
MCHC RBC AUTO-ENTMCNC: 32.6 G/DL (ref 32–34.5)
MCV RBC AUTO: 89 FL (ref 80–99.9)
MONOCYTES NFR BLD: 0.5 K/UL (ref 0.1–0.95)
MONOCYTES NFR BLD: 5 % (ref 2–12)
NEUTROPHILS NFR BLD: 73 % (ref 43–80)
NEUTS SEG NFR BLD: 7.77 K/UL (ref 1.8–7.3)
PLATELET # BLD AUTO: 192 K/UL (ref 130–450)
PMV BLD AUTO: 8.4 FL (ref 7–12)
POTASSIUM SERPL-SCNC: 4.4 MMOL/L (ref 3.5–5)
PROT SERPL-MCNC: 7.6 G/DL (ref 6.4–8.3)
RBC # BLD AUTO: 5.55 M/UL (ref 3.8–5.8)
SODIUM SERPL-SCNC: 138 MMOL/L (ref 132–146)
WBC OTHER # BLD: 10.6 K/UL (ref 4.5–11.5)

## 2024-05-17 PROCEDURE — 99284 EMERGENCY DEPT VISIT MOD MDM: CPT

## 2024-05-17 PROCEDURE — 80053 COMPREHEN METABOLIC PANEL: CPT

## 2024-05-17 PROCEDURE — 6360000002 HC RX W HCPCS: Performed by: PHYSICIAN ASSISTANT

## 2024-05-17 PROCEDURE — 93970 EXTREMITY STUDY: CPT

## 2024-05-17 PROCEDURE — 85025 COMPLETE CBC W/AUTO DIFF WBC: CPT

## 2024-05-17 RX ORDER — CEPHALEXIN 500 MG/1
500 CAPSULE ORAL 4 TIMES DAILY
Qty: 28 CAPSULE | Refills: 0 | Status: SHIPPED | OUTPATIENT
Start: 2024-05-17 | End: 2024-05-24

## 2024-05-17 RX ADMIN — ENOXAPARIN SODIUM 180 MG: 100 INJECTION SUBCUTANEOUS at 15:08

## 2024-05-17 ASSESSMENT — PAIN DESCRIPTION - DESCRIPTORS: DESCRIPTORS: ACHING;DISCOMFORT;THROBBING

## 2024-05-17 ASSESSMENT — PAIN SCALES - GENERAL: PAINLEVEL_OUTOF10: 5

## 2024-05-17 ASSESSMENT — PAIN DESCRIPTION - ORIENTATION: ORIENTATION: RIGHT;LEFT

## 2024-05-17 ASSESSMENT — PAIN DESCRIPTION - LOCATION: LOCATION: LEG

## 2024-05-17 ASSESSMENT — PAIN - FUNCTIONAL ASSESSMENT: PAIN_FUNCTIONAL_ASSESSMENT: 0-10

## 2024-05-17 NOTE — ED PROVIDER NOTES
Erythema and warmth over the left lower shin.  No pitting edema.  No calf tenderness.  Full range of motion bilateral lower extremities.  5/5 strength.  Sensations intact.  1+ posterior tibialis pulses.  Skin: warm and dry without rash  Neurologic: GCS 15,  Psych: Normal Affect      ------------------------------ ED COURSE/MEDICAL DECISION MAKING----------------------  Medications   enoxaparin (LOVENOX) 180 mg injection (180 mg SubCUTAneous Given 5/17/24 2733)         Medical Decision Making:     John Otoole is a 58 y.o. male with past medical history of cervical radiculopathy, hypertension, obstructive sleep apnea, tobacco abuse, morbid obesity, COPD with chronic hypoxia, and history of PE presenting to the ED for left leg pain.  Patient notes 3 days ago he started to have an increase in swelling of the left lower leg.  He also notes that the erythema and warmth.  He is not on any anticoagulation at home.  He does report a fall about a week ago but did not get checked out and states he has recovered since then.  There is some healing bruising over the left knee.  He ambulates well.  He has not tried any medications for symptoms.  Nothing makes it better or worse. VS stable. PE: see above       Ddx: Cellulitis versus lymphedema versus DVT     CBC: no leukocytosis, H&H stable  CMP: CO2 : 31   U/S: + DVT in the left gastrocnemius muscle    Patient received: Lovenox 180 mg IM    Patient notified of all test results.  Discharged home in stable condition with Eliquis starter pack as well as Keflex 3 times daily for 1 week.  Follow-up with your PCP.  He was educated on the side effects of blood thinner use and notified if he has a head injury to come in for head CT as he is at increased risk for bleeding.  He was notified to complete his antibiotics even if he is feeling better.  He was no further red flag symptoms and when to return to the ER.  He agreed treatment plan had no further questions.  On reevaluation he

## 2024-05-20 LAB
ANION GAP SERPL CALCULATED.3IONS-SCNC: 9 MMOL/L (ref 7–16)
BASOPHILS # BLD: 0.05 K/UL (ref 0–0.2)
BASOPHILS NFR BLD: 1 % (ref 0–2)
BUN SERPL-MCNC: 20 MG/DL (ref 6–20)
CALCIUM SERPL-MCNC: 9.2 MG/DL (ref 8.6–10.2)
CHLORIDE SERPL-SCNC: 96 MMOL/L (ref 98–107)
CO2 SERPL-SCNC: 29 MMOL/L (ref 22–29)
CREAT SERPL-MCNC: 1.1 MG/DL (ref 0.7–1.2)
EOSINOPHIL # BLD: 0.8 K/UL (ref 0.05–0.5)
EOSINOPHILS RELATIVE PERCENT: 8 % (ref 0–6)
ERYTHROCYTE [DISTWIDTH] IN BLOOD BY AUTOMATED COUNT: 13.7 % (ref 11.5–15)
GFR, ESTIMATED: 83 ML/MIN/1.73M2
GLUCOSE SERPL-MCNC: 114 MG/DL (ref 74–99)
HCT VFR BLD AUTO: 51.1 % (ref 37–54)
HGB BLD-MCNC: 16.9 G/DL (ref 12.5–16.5)
IMM GRANULOCYTES # BLD AUTO: 0.04 K/UL (ref 0–0.58)
IMM GRANULOCYTES NFR BLD: 0 % (ref 0–5)
LYMPHOCYTES NFR BLD: 2.08 K/UL (ref 1.5–4)
LYMPHOCYTES RELATIVE PERCENT: 21 % (ref 20–42)
MCH RBC QN AUTO: 29.8 PG (ref 26–35)
MCHC RBC AUTO-ENTMCNC: 33.1 G/DL (ref 32–34.5)
MCV RBC AUTO: 90 FL (ref 80–99.9)
MONOCYTES NFR BLD: 0.56 K/UL (ref 0.1–0.95)
MONOCYTES NFR BLD: 6 % (ref 2–12)
NEUTROPHILS NFR BLD: 65 % (ref 43–80)
NEUTS SEG NFR BLD: 6.63 K/UL (ref 1.8–7.3)
PLATELET # BLD AUTO: 205 K/UL (ref 130–450)
PMV BLD AUTO: 8.6 FL (ref 7–12)
POTASSIUM SERPL-SCNC: 4.3 MMOL/L (ref 3.5–5)
RBC # BLD AUTO: 5.68 M/UL (ref 3.8–5.8)
SODIUM SERPL-SCNC: 134 MMOL/L (ref 132–146)
TROPONIN I SERPL HS-MCNC: 11 NG/L (ref 0–11)
WBC OTHER # BLD: 10.2 K/UL (ref 4.5–11.5)

## 2024-05-20 PROCEDURE — 85025 COMPLETE CBC W/AUTO DIFF WBC: CPT

## 2024-05-20 PROCEDURE — 99285 EMERGENCY DEPT VISIT HI MDM: CPT

## 2024-05-20 PROCEDURE — 93005 ELECTROCARDIOGRAM TRACING: CPT

## 2024-05-20 PROCEDURE — 80048 BASIC METABOLIC PNL TOTAL CA: CPT

## 2024-05-20 PROCEDURE — 84484 ASSAY OF TROPONIN QUANT: CPT

## 2024-05-20 ASSESSMENT — LIFESTYLE VARIABLES
HOW OFTEN DO YOU HAVE A DRINK CONTAINING ALCOHOL: NEVER
HOW MANY STANDARD DRINKS CONTAINING ALCOHOL DO YOU HAVE ON A TYPICAL DAY: PATIENT DOES NOT DRINK

## 2024-05-20 ASSESSMENT — PAIN DESCRIPTION - FREQUENCY: FREQUENCY: CONTINUOUS

## 2024-05-20 ASSESSMENT — PAIN SCALES - GENERAL: PAINLEVEL_OUTOF10: 10

## 2024-05-20 ASSESSMENT — PAIN DESCRIPTION - ORIENTATION: ORIENTATION: RIGHT

## 2024-05-20 ASSESSMENT — PAIN DESCRIPTION - DESCRIPTORS: DESCRIPTORS: ACHING;THROBBING

## 2024-05-20 ASSESSMENT — PAIN - FUNCTIONAL ASSESSMENT: PAIN_FUNCTIONAL_ASSESSMENT: 0-10

## 2024-05-21 ENCOUNTER — HOSPITAL ENCOUNTER (EMERGENCY)
Age: 59
Discharge: HOME | End: 2024-05-21
Attending: STUDENT IN AN ORGANIZED HEALTH CARE EDUCATION/TRAINING PROGRAM
Payer: MEDICAID

## 2024-05-21 ENCOUNTER — APPOINTMENT (OUTPATIENT)
Dept: CT IMAGING | Age: 59
End: 2024-05-21
Payer: MEDICAID

## 2024-05-21 ENCOUNTER — APPOINTMENT (OUTPATIENT)
Dept: GENERAL RADIOLOGY | Age: 59
End: 2024-05-21
Payer: MEDICAID

## 2024-05-21 VITALS
WEIGHT: 315 LBS | TEMPERATURE: 98 F | OXYGEN SATURATION: 96 % | HEIGHT: 78 IN | SYSTOLIC BLOOD PRESSURE: 129 MMHG | RESPIRATION RATE: 18 BRPM | BODY MASS INDEX: 36.45 KG/M2 | DIASTOLIC BLOOD PRESSURE: 82 MMHG | HEART RATE: 76 BPM

## 2024-05-21 DIAGNOSIS — L03.90 CELLULITIS, UNSPECIFIED CELLULITIS SITE: ICD-10-CM

## 2024-05-21 DIAGNOSIS — Z53.29 LEFT AGAINST MEDICAL ADVICE: ICD-10-CM

## 2024-05-21 DIAGNOSIS — Z86.718 HISTORY OF DVT (DEEP VEIN THROMBOSIS): ICD-10-CM

## 2024-05-21 DIAGNOSIS — M79.89 LEG SWELLING: Primary | ICD-10-CM

## 2024-05-21 DIAGNOSIS — R53.83 FATIGUE, UNSPECIFIED TYPE: ICD-10-CM

## 2024-05-21 LAB
EKG ATRIAL RATE: 77 BPM
EKG P AXIS: 18 DEGREES
EKG P-R INTERVAL: 184 MS
EKG Q-T INTERVAL: 392 MS
EKG QRS DURATION: 94 MS
EKG QTC CALCULATION (BAZETT): 443 MS
EKG R AXIS: -9 DEGREES
EKG T AXIS: -6 DEGREES
EKG VENTRICULAR RATE: 77 BPM
TROPONIN I SERPL HS-MCNC: 12 NG/L (ref 0–11)

## 2024-05-21 PROCEDURE — 84484 ASSAY OF TROPONIN QUANT: CPT

## 2024-05-21 PROCEDURE — 71045 X-RAY EXAM CHEST 1 VIEW: CPT

## 2024-05-21 PROCEDURE — 93010 ELECTROCARDIOGRAM REPORT: CPT | Performed by: INTERNAL MEDICINE

## 2024-05-21 PROCEDURE — 71275 CT ANGIOGRAPHY CHEST: CPT

## 2024-05-21 PROCEDURE — 6360000004 HC RX CONTRAST MEDICATION: Performed by: RADIOLOGY

## 2024-05-21 RX ADMIN — IOPAMIDOL 120 ML: 755 INJECTION, SOLUTION INTRAVENOUS at 00:48

## 2024-05-21 NOTE — ED NOTES
Pt requesting to sign out stating \"I'm not going to sit here anymore.  My legs hurt and need to be elevated.\"  PT is educated on plan of care, risks of leaving before treatment is completed including death, benefits of staying.  Pt verbalized understanding and stated \"I'm not staying.\"  Dr. Gonzalez notified and spoke with patient.  Received AMA d/c order.  AMA paper explained to pt.  PT verbalized understanding and signed paper. PT is a&ox3, denies SI/HI, tolerating PO intake, walks with good balance, talking with clear speech.  PT also educated on taking Eliquis bid as its prescribed.  PT d/c

## 2024-05-21 NOTE — ED PROVIDER NOTES
Department of Emergency Medicine     Written by: Berta Zendejas DO  Patient Name: John Otoole  Admit Date: 2024  2:54 AM  MRN: [id number]                   : 1965      HPI  Chief Complaint   Patient presents with    Leg Swelling     PT reports seeing in ED r/t LLE swelling + clots.  Received lovenox started on eliquis.  Presenting to ED r/t RLE swelling.  PT reports compliance with eliquis; 5mg qday.     Fatigue     PT reports \"I feel so sleepy.  I can't stay awake.\"  PT reports increase fatigue x 2 days.      Patient is a 59yo male with a PMHx of NATALI, DVT and PE who presents for lower extremity swelling and fatigue. He was recently () seen at [facility] and started on Eliquis for the DVT and remote PE hx but states he is not taking the starter pack appropriately. He took himself off Eliquis years prior after his PE. Was also started on Keflex for cellulitis. Also endorsing intermittent chest pain and SOB. Not on oxygen at baseline. He is very fatigued lately and falls asleep often. He is supposed to wear a cpap at home, but is noncompliant. Denies sick contacts, diaphoresis, nausea, vomiting, fevers, chills or cough.     Review of systems:    Pertinent positives and negatives mentioned in the HPI/MDM.    Physical Exam  Vitals reviewed.   Constitutional:       General: He is not in acute distress.     Appearance: He is obese. He is not ill-appearing, toxic-appearing or diaphoretic.      Comments: Somnolent but arousable   Cardiovascular:      Rate and Rhythm: Normal rate and regular rhythm.      Heart sounds: Normal heart sounds.   Pulmonary:      Effort: Pulmonary effort is normal. No tachypnea, bradypnea, accessory muscle usage, prolonged expiration, respiratory distress or retractions.      Breath sounds: Normal breath sounds. No wheezing, rhonchi or rales.   Abdominal:      General: Bowel sounds are normal.      Palpations: Abdomen is soft.      Tenderness: There is no abdominal tenderness.  English

## 2024-05-21 NOTE — ED NOTES
PT refused ABG.  Pt educated on purpose of ABG, risks of not completing.  PT verbalized understanding and continued to refuse.  Dr. Gonzalez notified.

## 2024-05-30 ENCOUNTER — HOSPITAL ENCOUNTER (OUTPATIENT)
Dept: SLEEP CENTER | Age: 59
Discharge: HOME | End: 2024-05-30

## 2024-05-30 DIAGNOSIS — G47.33 OSA (OBSTRUCTIVE SLEEP APNEA): ICD-10-CM

## 2024-05-30 DIAGNOSIS — J44.1 COPD EXACERBATION (HCC): Primary | ICD-10-CM

## 2024-05-30 DIAGNOSIS — I10 HYPERTENSION, UNSPECIFIED TYPE: ICD-10-CM

## 2024-07-24 ENCOUNTER — HOSPITAL ENCOUNTER (EMERGENCY)
Age: 59
Discharge: HOME | End: 2024-07-24
Attending: EMERGENCY MEDICINE
Payer: MEDICAID

## 2024-07-24 ENCOUNTER — APPOINTMENT (OUTPATIENT)
Dept: ULTRASOUND IMAGING | Age: 59
End: 2024-07-24
Payer: MEDICAID

## 2024-07-24 ENCOUNTER — APPOINTMENT (OUTPATIENT)
Dept: CT IMAGING | Age: 59
End: 2024-07-24
Payer: MEDICAID

## 2024-07-24 VITALS
HEIGHT: 76 IN | WEIGHT: 315 LBS | HEART RATE: 64 BPM | DIASTOLIC BLOOD PRESSURE: 83 MMHG | SYSTOLIC BLOOD PRESSURE: 124 MMHG | RESPIRATION RATE: 20 BRPM | TEMPERATURE: 98.2 F | OXYGEN SATURATION: 92 % | BODY MASS INDEX: 38.36 KG/M2

## 2024-07-24 DIAGNOSIS — K40.90 NON-RECURRENT UNILATERAL INGUINAL HERNIA WITHOUT OBSTRUCTION OR GANGRENE: ICD-10-CM

## 2024-07-24 DIAGNOSIS — K42.9 PERIUMBILICAL HERNIA: ICD-10-CM

## 2024-07-24 DIAGNOSIS — I82.562 CHRONIC DEEP VEIN THROMBOSIS (DVT) OF CALF MUSCLE VEIN OF LEFT LOWER EXTREMITY (HCC): Primary | ICD-10-CM

## 2024-07-24 LAB
ALBUMIN SERPL-MCNC: 4.1 G/DL (ref 3.5–5.2)
ALP SERPL-CCNC: 70 U/L (ref 40–129)
ALT SERPL-CCNC: 32 U/L (ref 0–40)
ANION GAP SERPL CALCULATED.3IONS-SCNC: 9 MMOL/L (ref 7–16)
AST SERPL-CCNC: 26 U/L (ref 0–39)
BASOPHILS # BLD: 0.04 K/UL (ref 0–0.2)
BASOPHILS NFR BLD: 0 % (ref 0–2)
BILIRUB SERPL-MCNC: 0.8 MG/DL (ref 0–1.2)
BILIRUB UR QL STRIP: NEGATIVE
BUN SERPL-MCNC: 22 MG/DL (ref 6–20)
CALCIUM SERPL-MCNC: 9.5 MG/DL (ref 8.6–10.2)
CHLORIDE SERPL-SCNC: 99 MMOL/L (ref 98–107)
CLARITY UR: CLEAR
CO2 SERPL-SCNC: 29 MMOL/L (ref 22–29)
COLOR UR: ABNORMAL
CREAT SERPL-MCNC: 1.4 MG/DL (ref 0.7–1.2)
EOSINOPHIL # BLD: 0.1 K/UL (ref 0.05–0.5)
EOSINOPHILS RELATIVE PERCENT: 1 % (ref 0–6)
ERYTHROCYTE [DISTWIDTH] IN BLOOD BY AUTOMATED COUNT: 13.5 % (ref 11.5–15)
GFR, ESTIMATED: 62 ML/MIN/1.73M2
GLUCOSE SERPL-MCNC: 137 MG/DL (ref 74–99)
GLUCOSE UR STRIP-MCNC: NEGATIVE MG/DL
HCT VFR BLD AUTO: 51.9 % (ref 37–54)
HGB BLD-MCNC: 16.6 G/DL (ref 12.5–16.5)
HGB UR QL STRIP.AUTO: NEGATIVE
IMM GRANULOCYTES # BLD AUTO: 0.04 K/UL (ref 0–0.58)
IMM GRANULOCYTES NFR BLD: 0 % (ref 0–5)
KETONES UR STRIP-MCNC: NEGATIVE MG/DL
LACTATE BLDV-SCNC: 1.6 MMOL/L (ref 0.5–2.2)
LEUKOCYTE ESTERASE UR QL STRIP: NEGATIVE
LYMPHOCYTES NFR BLD: 2.38 K/UL (ref 1.5–4)
LYMPHOCYTES RELATIVE PERCENT: 24 % (ref 20–42)
MCH RBC QN AUTO: 28.4 PG (ref 26–35)
MCHC RBC AUTO-ENTMCNC: 32 G/DL (ref 32–34.5)
MCV RBC AUTO: 88.7 FL (ref 80–99.9)
MONOCYTES NFR BLD: 0.53 K/UL (ref 0.1–0.95)
MONOCYTES NFR BLD: 5 % (ref 2–12)
NEUTROPHILS NFR BLD: 69 % (ref 43–80)
NEUTS SEG NFR BLD: 6.93 K/UL (ref 1.8–7.3)
NITRITE UR QL STRIP: NEGATIVE
PH UR STRIP: 5.5 [PH] (ref 5–9)
PLATELET # BLD AUTO: 224 K/UL (ref 130–450)
PMV BLD AUTO: 8.5 FL (ref 7–12)
POTASSIUM SERPL-SCNC: 4.1 MMOL/L (ref 3.5–5)
PROT SERPL-MCNC: 8.4 G/DL (ref 6.4–8.3)
PROT UR STRIP-MCNC: NEGATIVE MG/DL
RBC # BLD AUTO: 5.85 M/UL (ref 3.8–5.8)
RBC #/AREA URNS HPF: ABNORMAL /HPF
SODIUM SERPL-SCNC: 137 MMOL/L (ref 132–146)
SP GR UR STRIP: 1.02 (ref 1–1.03)
UROBILINOGEN UR STRIP-ACNC: 0.2 EU/DL (ref 0–1)
WBC #/AREA URNS HPF: ABNORMAL /HPF
WBC OTHER # BLD: 10 K/UL (ref 4.5–11.5)

## 2024-07-24 PROCEDURE — 76870 US EXAM SCROTUM: CPT

## 2024-07-24 PROCEDURE — 99285 EMERGENCY DEPT VISIT HI MDM: CPT

## 2024-07-24 PROCEDURE — 80053 COMPREHEN METABOLIC PANEL: CPT

## 2024-07-24 PROCEDURE — 93970 EXTREMITY STUDY: CPT

## 2024-07-24 PROCEDURE — 6370000000 HC RX 637 (ALT 250 FOR IP)

## 2024-07-24 PROCEDURE — 2580000003 HC RX 258

## 2024-07-24 PROCEDURE — 6360000004 HC RX CONTRAST MEDICATION: Performed by: RADIOLOGY

## 2024-07-24 PROCEDURE — 71275 CT ANGIOGRAPHY CHEST: CPT

## 2024-07-24 PROCEDURE — 85025 COMPLETE CBC W/AUTO DIFF WBC: CPT

## 2024-07-24 PROCEDURE — 81001 URINALYSIS AUTO W/SCOPE: CPT

## 2024-07-24 PROCEDURE — 93975 VASCULAR STUDY: CPT

## 2024-07-24 PROCEDURE — 74177 CT ABD & PELVIS W/CONTRAST: CPT

## 2024-07-24 PROCEDURE — 96360 HYDRATION IV INFUSION INIT: CPT

## 2024-07-24 PROCEDURE — 83605 ASSAY OF LACTIC ACID: CPT

## 2024-07-24 RX ORDER — 0.9 % SODIUM CHLORIDE 0.9 %
1000 INTRAVENOUS SOLUTION INTRAVENOUS ONCE
Status: COMPLETED | OUTPATIENT
Start: 2024-07-24 | End: 2024-07-24

## 2024-07-24 RX ORDER — OXYCODONE HYDROCHLORIDE AND ACETAMINOPHEN 5; 325 MG/1; MG/1
1 TABLET ORAL ONCE
Status: COMPLETED | OUTPATIENT
Start: 2024-07-24 | End: 2024-07-24

## 2024-07-24 RX ADMIN — OXYCODONE HYDROCHLORIDE AND ACETAMINOPHEN 1 TABLET: 5; 325 TABLET ORAL at 14:00

## 2024-07-24 RX ADMIN — SODIUM CHLORIDE 1000 ML: 9 INJECTION, SOLUTION INTRAVENOUS at 15:26

## 2024-07-24 RX ADMIN — IOPAMIDOL 75 ML: 755 INJECTION, SOLUTION INTRAVENOUS at 15:26

## 2024-07-24 NOTE — ED NOTES
Updated patient on plan of care. Patient has no stated problems or concerns at this time. All questions answered at this time.

## 2024-07-24 NOTE — DISCHARGE INSTRUCTIONS
US DUP ABD PEL RETRO SCROT COMPLETE   Final Result   Normal appearance of the bilateral testicles.  No evidence of intra   testicular mass.  Normal testicular vascularity.  Trace simple appearing   scrotal fluid bilaterally.         US SCROTUM AND TESTICLES   Final Result   Normal appearance of the bilateral testicles.  No evidence of intra   testicular mass.  Normal testicular vascularity.  Trace simple appearing   scrotal fluid bilaterally.         CT ABDOMEN PELVIS W IV CONTRAST Additional Contrast? None   Final Result   1. Small amount of fat identified within right inguinal canal.  No associated   inflammatory change or bowel identified within the right inguinal canal.   2. Fat containing periumbilical ventral wall hernia measuring 1 cm as   discussed   3. Bladder wall thickening up to 1 cm. Correlate with urinalysis to exclude   cystitis.         CTA PULMONARY W CONTRAST   Final Result   1. No evidence of pulmonary embolism.   2. No acute abnormality seen in the chest.   3. Prominent calcified atherosclerosis in the coronary arteries.         Vascular duplex lower extremity venous bilateral   Final Result   Persistent nonocclusive thrombus in the left gastrocnemius vein.           Follow up with general surgery if your groin pain persists to discuss treatment of your fat containing hernias   Continue to take your eliquis

## 2024-07-24 NOTE — ED PROVIDER NOTES
[facility] EMERGENCY DEPARTMENT  EMERGENCY DEPARTMENT ENCOUNTER        Pt Name: John Otoole  MRN: [id number]  Birthdate 1965  Date of evaluation: 7/24/2024  Provider: Kenneth Flowers DO  PCP: Hebert Perales, ALIE - CNP  Note Started: 1:19 PM EDT 7/24/24    CHIEF COMPLAINT       Chief Complaint   Patient presents with    Groin Pain     Right groin pain radiating into right testicle x3 days    Back Pain     Chronic lower back pain    Leg Pain     Bilat lower leg pain, hx DVT On eliquis       HISTORY OF PRESENT ILLNESS: 1 or more Elements   History From: PATIENT     Limitations to history : None    John Otoole is a 58 y.o. male with prior history of PE, DVT currently on Eliquis, NATALI, tobacco user and chronic back pain arriving with a complaint of right-sided groin pain/testicle pain, bilateral lower calf pain and acute on chronic back pain.  He states the symptoms started a couple days ago but not at the same time.  He feels like the inguinal pain was more of a sudden onset and he states that almost 30 years ago he was told he had an inguinal hernia.  He feels like his right testicle is high riding.  He feels like he has a DVT in his lower extremities but states he is compliant with his Eliquis.  No significant reported lower extremity swelling.      Nursing Notes were all reviewed and agreed with or any disagreements were addressed in the HPI.    REVIEW OF SYSTEMS :      Review of Systems    POSITIVE (+): Groin pain, leg pain  NEGATIVE (-): fevers, chills, nausea, vomiting, diarrhea, constipation, shortness of breath, chest pain, abdominal pain      SURGICAL HISTORY     Past Surgical History:   Procedure Laterality Date    ARTERY BIOPSY  09/11    Aorta  fibrosis    CARDIAC CATHETERIZATION  2002    Negative    CARDIOVASCULAR STRESS TEST  2002    positive heart cath OK      NERVE BLOCK N/A 1/9/15    cervical epidural #2    NERVE BLOCK  01/23/15    cervical

## 2024-09-30 ENCOUNTER — APPOINTMENT (OUTPATIENT)
Dept: CT IMAGING | Age: 59
End: 2024-09-30
Payer: MEDICAID

## 2024-09-30 ENCOUNTER — HOSPITAL ENCOUNTER (EMERGENCY)
Age: 59
Discharge: HOME | End: 2024-09-30
Attending: STUDENT IN AN ORGANIZED HEALTH CARE EDUCATION/TRAINING PROGRAM
Payer: MEDICAID

## 2024-09-30 VITALS
HEART RATE: 75 BPM | RESPIRATION RATE: 18 BRPM | OXYGEN SATURATION: 91 % | DIASTOLIC BLOOD PRESSURE: 101 MMHG | SYSTOLIC BLOOD PRESSURE: 139 MMHG | WEIGHT: 315 LBS | TEMPERATURE: 98.1 F | BODY MASS INDEX: 38.36 KG/M2 | HEIGHT: 76 IN

## 2024-09-30 DIAGNOSIS — L03.314 CELLULITIS OF GROIN, LEFT: ICD-10-CM

## 2024-09-30 DIAGNOSIS — S39.012A STRAIN OF LUMBAR REGION, INITIAL ENCOUNTER: Primary | ICD-10-CM

## 2024-09-30 DIAGNOSIS — I71.40 ABDOMINAL AORTIC ANEURYSM (AAA) WITHOUT RUPTURE, UNSPECIFIED PART (HCC): ICD-10-CM

## 2024-09-30 PROCEDURE — 96372 THER/PROPH/DIAG INJ SC/IM: CPT

## 2024-09-30 PROCEDURE — 6360000002 HC RX W HCPCS

## 2024-09-30 PROCEDURE — 72131 CT LUMBAR SPINE W/O DYE: CPT

## 2024-09-30 PROCEDURE — 6370000000 HC RX 637 (ALT 250 FOR IP)

## 2024-09-30 PROCEDURE — 99284 EMERGENCY DEPT VISIT MOD MDM: CPT

## 2024-09-30 RX ORDER — CYCLOBENZAPRINE HCL 10 MG
10 TABLET ORAL 3 TIMES DAILY PRN
Qty: 21 TABLET | Refills: 0 | Status: SHIPPED | OUTPATIENT
Start: 2024-09-30 | End: 2024-09-30

## 2024-09-30 RX ORDER — DOXYCYCLINE HYCLATE 100 MG
100 TABLET ORAL 2 TIMES DAILY
Qty: 14 TABLET | Refills: 0 | Status: SHIPPED | OUTPATIENT
Start: 2024-09-30 | End: 2024-10-07

## 2024-09-30 RX ORDER — LIDOCAINE 50 MG/G
1 PATCH TOPICAL DAILY
Qty: 10 PATCH | Refills: 0 | Status: SHIPPED | OUTPATIENT
Start: 2024-09-30 | End: 2024-10-10

## 2024-09-30 RX ORDER — DOXYCYCLINE 100 MG/1
100 CAPSULE ORAL ONCE
Status: COMPLETED | OUTPATIENT
Start: 2024-09-30 | End: 2024-09-30

## 2024-09-30 RX ORDER — KETOROLAC TROMETHAMINE 30 MG/ML
30 INJECTION, SOLUTION INTRAMUSCULAR; INTRAVENOUS ONCE
Status: COMPLETED | OUTPATIENT
Start: 2024-09-30 | End: 2024-09-30

## 2024-09-30 RX ORDER — HYDROCODONE BITARTRATE AND ACETAMINOPHEN 5; 325 MG/1; MG/1
1 TABLET ORAL EVERY 8 HOURS PRN
Qty: 6 TABLET | Refills: 0 | Status: SHIPPED | OUTPATIENT
Start: 2024-09-30 | End: 2024-10-03

## 2024-09-30 RX ORDER — LIDOCAINE 4 G/G
1 PATCH TOPICAL ONCE
Status: DISCONTINUED | OUTPATIENT
Start: 2024-09-30 | End: 2024-10-01 | Stop reason: HOSPADM

## 2024-09-30 RX ORDER — CEPHALEXIN 500 MG/1
500 CAPSULE ORAL 4 TIMES DAILY
Qty: 28 CAPSULE | Refills: 0 | Status: SHIPPED | OUTPATIENT
Start: 2024-09-30 | End: 2024-10-07

## 2024-09-30 RX ORDER — ORPHENADRINE CITRATE 30 MG/ML
60 INJECTION INTRAMUSCULAR; INTRAVENOUS ONCE
Status: COMPLETED | OUTPATIENT
Start: 2024-09-30 | End: 2024-09-30

## 2024-09-30 RX ORDER — CEPHALEXIN 500 MG/1
500 CAPSULE ORAL ONCE
Status: COMPLETED | OUTPATIENT
Start: 2024-09-30 | End: 2024-09-30

## 2024-09-30 RX ADMIN — KETOROLAC TROMETHAMINE 30 MG: 30 INJECTION, SOLUTION INTRAMUSCULAR at 22:01

## 2024-09-30 RX ADMIN — CEPHALEXIN 500 MG: 500 CAPSULE ORAL at 22:01

## 2024-09-30 RX ADMIN — DOXYCYCLINE HYCLATE 100 MG: 100 CAPSULE ORAL at 22:01

## 2024-09-30 RX ADMIN — ORPHENADRINE CITRATE 60 MG: 60 INJECTION INTRAMUSCULAR; INTRAVENOUS at 22:01

## 2024-09-30 ASSESSMENT — PAIN DESCRIPTION - FREQUENCY: FREQUENCY: CONTINUOUS

## 2024-09-30 ASSESSMENT — PAIN - FUNCTIONAL ASSESSMENT
PAIN_FUNCTIONAL_ASSESSMENT: 0-10
PAIN_FUNCTIONAL_ASSESSMENT: 0-10

## 2024-09-30 ASSESSMENT — PAIN DESCRIPTION - ORIENTATION
ORIENTATION: MID;LOWER
ORIENTATION: LOWER;MID;LEFT

## 2024-09-30 ASSESSMENT — PAIN SCALES - GENERAL
PAINLEVEL_OUTOF10: 7
PAINLEVEL_OUTOF10: 8

## 2024-09-30 ASSESSMENT — PAIN DESCRIPTION - LOCATION
LOCATION: BACK
LOCATION: BACK

## 2024-09-30 ASSESSMENT — PAIN DESCRIPTION - DESCRIPTORS: DESCRIPTORS: DISCOMFORT

## 2024-09-30 ASSESSMENT — PAIN DESCRIPTION - PAIN TYPE: TYPE: ACUTE PAIN

## 2024-09-30 ASSESSMENT — PAIN DESCRIPTION - ONSET: ONSET: ON-GOING

## 2024-09-30 NOTE — ED TRIAGE NOTES
Department of Emergency Medicine    FIRST PROVIDER TRIAGE NOTE             Independent MLP           9/30/24  4:52 PM EDT    Date of Encounter: 9/30/24   MRN: [id number]    Vitals:    09/30/24 1633 09/30/24 1652 09/30/24 1653   BP:   (!) 129/94   Pulse:  78    Resp:  18    Temp:  98.1 °F (36.7 °C)    TempSrc:  Oral    SpO2:   92%   Weight: (!) 172.4 kg (380 lb)     Height: 1.93 m (6' 4\")        HPI: John Otoole is a 58 y.o. male who presents to the ED for Back Pain (Ongoing for a week; pt states he has difficult with daily activity . ) and Wound Check (Left groin abscess for one month; patient wants to have it looked at. )     Reports wound to left groin  - present for month or longer   Not a diabetic     ROS: Negative for cp, sob, or fever.    Physical Exam:   Gen Appearance/Constitutional: alert  CV: regular rate     Initial Plan of Care: All treatment areas with department are currently occupied.     Plan to order/Initiate the following while awaiting opening in ED: Triage evaluation.    Provider-Patient relationship only established for Provider In Triage (PIT).  Full assessment, HPI and examination not performed, therefore, it is not yet possible to state whether or not an emergency medical condition exists  Supervisor request for APC to initiate contact and input an assessment note in triage during high volume surges.     Initial Plan of Care: Initiate Treatment-Testing, Proceed toTreatment Area When Bed Available for ED Attending/MLP to Continue Care  Secondary to high volume, low staffing, and/or boarding- patient to await bed availability.    This ends my PIT-Patient relationship.  Care of patient relinquished after triage    Electronically signed by Rhianna Vigil PA-C   DD: 9/30/24

## 2024-10-01 NOTE — DISCHARGE INSTRUCTIONS
Take your antibiotics as are prescribed.  Use lidocaine patches, Flexeril, and Tylenol and ibuprofen for further symptom relief.    CT LUMBAR SPINE WO CONTRAST   Final Result   1. No acute fracture is identified.   2. Moderate to severe degenerative changes, with multilevel central canal and   neural foraminal stenosis.   3. 3 cm abdominal aortic aneurysm incidentally suggested, recommend follow-up   every 3 years, and left common iliac artery at 2.2 cm.

## 2024-10-01 NOTE — ED PROVIDER NOTES
dilatation of his aorta.    Patient has known about his degenerative changes in his spine and actually follows with a neurosurgeon and is due for procedure in mid October.    He was medicated with lidocaine patch, Toradol, and Norflex.  On reevaluation, symptoms are improving.  For his likely developing cellulitis in his groin, he was given Keflex and doxycycline to take.  He is not currently sexually active and denies any history of risky sexual behavior.    Patient is comfortable with outpatient follow-up.  At time of discharge, the patient was hemodynamically stable.    CONSULTS: (Who and What was discussed)  None        I am the Primary Clinician of Record.    FINAL IMPRESSION      1. Strain of lumbar region, initial encounter    2. Cellulitis of groin, left    3. Abdominal aortic aneurysm (AAA) without rupture, unspecified part ([facility])          DISPOSITION/PLAN     DISPOSITION Decision To Discharge 09/30/2024 10:44:52 PM      PATIENT REFERRED TO:  Hebert Perales, APRN - CNP  [address] [address]  [phone]    In 2 days      [facility] Emergency Department  [address] [address]  [phone]    If symptoms worsen    Rodger Carmen, DO  [address] [address]  [phone]    Call       Leonard Barnett MD  [address] [address]  [phone]            DISCHARGE MEDICATIONS:  Discharge Medication List as of 9/30/2024 10:59 PM        START taking these medications    Details   cephALEXin (KEFLEX) 500 MG capsule Take 1 capsule by mouth 4 times daily for 7 days, Disp-28 capsule, R-0Normal      doxycycline hyclate (VIBRA-TABS) 100 MG tablet Take 1 tablet by mouth 2 times daily for 7 days, Disp-14 tablet, R-0Normal      lidocaine (LIDODERM) 5 % Place 1 patch onto the skin daily for 10 days 12 hours on, 12 hours off., Disp-10 patch, R-0Normal      HYDROcodone-acetaminophen (NORCO) 5-325 MG per tablet Take 1

## 2025-05-23 ENCOUNTER — HOSPITAL ENCOUNTER (EMERGENCY)
Age: 60
Discharge: HOME | End: 2025-05-23
Attending: EMERGENCY MEDICINE
Payer: MEDICAID

## 2025-05-23 ENCOUNTER — APPOINTMENT (OUTPATIENT)
Dept: GENERAL RADIOLOGY | Age: 60
End: 2025-05-23
Payer: MEDICAID

## 2025-05-23 VITALS
TEMPERATURE: 98.1 F | DIASTOLIC BLOOD PRESSURE: 80 MMHG | OXYGEN SATURATION: 94 % | RESPIRATION RATE: 18 BRPM | SYSTOLIC BLOOD PRESSURE: 150 MMHG | WEIGHT: 315 LBS | HEART RATE: 79 BPM | BODY MASS INDEX: 47.47 KG/M2

## 2025-05-23 DIAGNOSIS — R73.9 HYPERGLYCEMIA: ICD-10-CM

## 2025-05-23 DIAGNOSIS — R61 DIAPHORESIS: ICD-10-CM

## 2025-05-23 DIAGNOSIS — J44.9 CHRONIC OBSTRUCTIVE PULMONARY DISEASE, UNSPECIFIED COPD TYPE (HCC): Primary | ICD-10-CM

## 2025-05-23 LAB
ALBUMIN SERPL-MCNC: 3.7 G/DL (ref 3.5–5.2)
ALP SERPL-CCNC: 77 U/L (ref 40–129)
ALT SERPL-CCNC: 28 U/L (ref 0–40)
AMPHET UR QL SCN: POSITIVE
AMYLASE SERPL-CCNC: 48 U/L (ref 20–100)
ANION GAP SERPL CALCULATED.3IONS-SCNC: 12 MMOL/L (ref 7–16)
AST SERPL-CCNC: 40 U/L (ref 0–39)
BACTERIA URNS QL MICRO: ABNORMAL
BARBITURATES UR QL SCN: NEGATIVE
BENZODIAZ UR QL: NEGATIVE
BILIRUB DIRECT SERPL-MCNC: <0.2 MG/DL (ref 0–0.3)
BILIRUB INDIRECT SERPL-MCNC: ABNORMAL MG/DL (ref 0–1)
BILIRUB SERPL-MCNC: 0.9 MG/DL (ref 0–1.2)
BILIRUB UR QL STRIP: NEGATIVE
BNP SERPL-MCNC: <26 PG/ML (ref 0–125)
BUN SERPL-MCNC: 22 MG/DL (ref 6–20)
BUPRENORPHINE UR QL: NEGATIVE
CALCIUM SERPL-MCNC: 9.2 MG/DL (ref 8.6–10.2)
CANNABINOIDS UR QL SCN: POSITIVE
CHLORIDE SERPL-SCNC: 98 MMOL/L (ref 98–107)
CK SERPL-CCNC: 121 U/L (ref 20–200)
CLARITY UR: CLEAR
CO2 SERPL-SCNC: 24 MMOL/L (ref 22–29)
COCAINE UR QL SCN: NEGATIVE
COLOR UR: YELLOW
CREAT SERPL-MCNC: 1.4 MG/DL (ref 0.7–1.2)
D-DIMER QUANTITATIVE: 297 NG/ML DDU (ref 0–230)
ERYTHROCYTE [DISTWIDTH] IN BLOOD BY AUTOMATED COUNT: 13.6 % (ref 11.5–15)
FENTANYL UR QL: NEGATIVE
GFR, ESTIMATED: 61 ML/MIN/1.73M2
GLUCOSE SERPL-MCNC: 144 MG/DL (ref 74–99)
GLUCOSE UR STRIP-MCNC: NEGATIVE MG/DL
HCT VFR BLD AUTO: 45.3 % (ref 37–54)
HGB BLD-MCNC: 15 G/DL (ref 12.5–16.5)
HGB UR QL STRIP.AUTO: NEGATIVE
KETONES UR STRIP-MCNC: NEGATIVE MG/DL
LEUKOCYTE ESTERASE UR QL STRIP: NEGATIVE
LIPASE SERPL-CCNC: 18 U/L (ref 13–60)
MAGNESIUM SERPL-MCNC: 2 MG/DL (ref 1.6–2.6)
MCH RBC QN AUTO: 28.4 PG (ref 26–35)
MCHC RBC AUTO-ENTMCNC: 33.1 G/DL (ref 32–34.5)
MCV RBC AUTO: 85.6 FL (ref 80–99.9)
METHADONE UR QL: POSITIVE
MUCOUS THREADS URNS QL MICRO: PRESENT
NITRITE UR QL STRIP: NEGATIVE
OPIATES UR QL SCN: NEGATIVE
OXYCODONE UR QL SCN: NEGATIVE
PCP UR QL SCN: NEGATIVE
PH UR STRIP: 5.5 [PH] (ref 5–8)
PLATELET # BLD AUTO: 246 K/UL (ref 130–450)
PMV BLD AUTO: 9.2 FL (ref 7–12)
POTASSIUM SERPL-SCNC: 5.1 MMOL/L (ref 3.5–5)
PROT SERPL-MCNC: 7.8 G/DL (ref 6.4–8.3)
PROT UR STRIP-MCNC: NEGATIVE MG/DL
RBC # BLD AUTO: 5.29 M/UL (ref 3.8–5.8)
RBC #/AREA URNS HPF: ABNORMAL /HPF
SODIUM SERPL-SCNC: 134 MMOL/L (ref 132–146)
SP GR UR STRIP: 1.02 (ref 1–1.03)
TEST INFORMATION: ABNORMAL
TROPONIN I SERPL HS-MCNC: 11 NG/L (ref 0–22)
TROPONIN I SERPL HS-MCNC: 14 NG/L (ref 0–22)
UROBILINOGEN UR STRIP-ACNC: 0.2 EU/DL (ref 0–1)
WBC #/AREA URNS HPF: ABNORMAL /HPF
WBC OTHER # BLD: 11.3 K/UL (ref 4.5–11.5)

## 2025-05-23 PROCEDURE — 80179 DRUG ASSAY SALICYLATE: CPT

## 2025-05-23 PROCEDURE — 99285 EMERGENCY DEPT VISIT HI MDM: CPT

## 2025-05-23 PROCEDURE — 84443 ASSAY THYROID STIM HORMONE: CPT

## 2025-05-23 PROCEDURE — 82248 BILIRUBIN DIRECT: CPT

## 2025-05-23 PROCEDURE — 82150 ASSAY OF AMYLASE: CPT

## 2025-05-23 PROCEDURE — 83880 ASSAY OF NATRIURETIC PEPTIDE: CPT

## 2025-05-23 PROCEDURE — 85027 COMPLETE CBC AUTOMATED: CPT

## 2025-05-23 PROCEDURE — 84439 ASSAY OF FREE THYROXINE: CPT

## 2025-05-23 PROCEDURE — 81001 URINALYSIS AUTO W/SCOPE: CPT

## 2025-05-23 PROCEDURE — 82550 ASSAY OF CK (CPK): CPT

## 2025-05-23 PROCEDURE — 83690 ASSAY OF LIPASE: CPT

## 2025-05-23 PROCEDURE — 84484 ASSAY OF TROPONIN QUANT: CPT

## 2025-05-23 PROCEDURE — 93005 ELECTROCARDIOGRAM TRACING: CPT | Performed by: EMERGENCY MEDICINE

## 2025-05-23 PROCEDURE — 80053 COMPREHEN METABOLIC PANEL: CPT

## 2025-05-23 PROCEDURE — 6370000000 HC RX 637 (ALT 250 FOR IP): Performed by: EMERGENCY MEDICINE

## 2025-05-23 PROCEDURE — 71045 X-RAY EXAM CHEST 1 VIEW: CPT

## 2025-05-23 PROCEDURE — 83735 ASSAY OF MAGNESIUM: CPT

## 2025-05-23 PROCEDURE — 80143 DRUG ASSAY ACETAMINOPHEN: CPT

## 2025-05-23 PROCEDURE — 80307 DRUG TEST PRSMV CHEM ANLYZR: CPT

## 2025-05-23 PROCEDURE — 85379 FIBRIN DEGRADATION QUANT: CPT

## 2025-05-23 PROCEDURE — G0480 DRUG TEST DEF 1-7 CLASSES: HCPCS

## 2025-05-23 RX ORDER — ASPIRIN 81 MG/1
324 TABLET, CHEWABLE ORAL ONCE
Status: COMPLETED | OUTPATIENT
Start: 2025-05-23 | End: 2025-05-23

## 2025-05-23 RX ADMIN — ASPIRIN 81 MG CHEWABLE TABLET 324 MG: 81 TABLET CHEWABLE at 20:47

## 2025-05-23 ASSESSMENT — PAIN - FUNCTIONAL ASSESSMENT: PAIN_FUNCTIONAL_ASSESSMENT: NONE - DENIES PAIN

## 2025-05-24 ENCOUNTER — HOSPITAL ENCOUNTER (EMERGENCY)
Age: 60
Discharge: HOME | End: 2025-05-25
Attending: STUDENT IN AN ORGANIZED HEALTH CARE EDUCATION/TRAINING PROGRAM
Payer: MEDICAID

## 2025-05-24 ENCOUNTER — APPOINTMENT (OUTPATIENT)
Dept: CT IMAGING | Age: 60
End: 2025-05-24
Payer: MEDICAID

## 2025-05-24 VITALS
WEIGHT: 315 LBS | OXYGEN SATURATION: 94 % | DIASTOLIC BLOOD PRESSURE: 68 MMHG | TEMPERATURE: 97.5 F | HEIGHT: 76 IN | BODY MASS INDEX: 38.36 KG/M2 | HEART RATE: 92 BPM | SYSTOLIC BLOOD PRESSURE: 98 MMHG | RESPIRATION RATE: 18 BRPM

## 2025-05-24 DIAGNOSIS — L74.9 SWEATING ABNORMALITY: Primary | ICD-10-CM

## 2025-05-24 LAB
ALBUMIN SERPL-MCNC: 4 G/DL (ref 3.5–5.2)
ALP SERPL-CCNC: 82 U/L (ref 40–129)
ALT SERPL-CCNC: 25 U/L (ref 0–40)
ANION GAP SERPL CALCULATED.3IONS-SCNC: 11 MMOL/L (ref 7–16)
APAP SERPL-MCNC: <5 UG/ML (ref 10–30)
AST SERPL-CCNC: 20 U/L (ref 0–39)
BASOPHILS # BLD: 0.03 K/UL (ref 0–0.2)
BASOPHILS NFR BLD: 0 % (ref 0–2)
BILIRUB SERPL-MCNC: 0.6 MG/DL (ref 0–1.2)
BNP SERPL-MCNC: <36 PG/ML (ref 0–125)
BUN SERPL-MCNC: 18 MG/DL (ref 6–20)
CALCIUM SERPL-MCNC: 9.3 MG/DL (ref 8.6–10.2)
CHLORIDE SERPL-SCNC: 98 MMOL/L (ref 98–107)
CO2 SERPL-SCNC: 27 MMOL/L (ref 22–29)
CREAT SERPL-MCNC: 1.3 MG/DL (ref 0.7–1.2)
EKG ATRIAL RATE: 77 BPM
EKG P AXIS: 30 DEGREES
EKG P-R INTERVAL: 184 MS
EKG Q-T INTERVAL: 396 MS
EKG QRS DURATION: 106 MS
EKG QTC CALCULATION (BAZETT): 448 MS
EKG R AXIS: -12 DEGREES
EKG T AXIS: 25 DEGREES
EKG VENTRICULAR RATE: 77 BPM
EOSINOPHIL # BLD: 0.08 K/UL (ref 0.05–0.5)
EOSINOPHILS RELATIVE PERCENT: 1 % (ref 0–6)
ERYTHROCYTE [DISTWIDTH] IN BLOOD BY AUTOMATED COUNT: 13.7 % (ref 11.5–15)
ETHANOLAMINE SERPL-MCNC: <10 MG/DL (ref 0–0.08)
GFR, ESTIMATED: 65 ML/MIN/1.73M2
GLUCOSE SERPL-MCNC: 122 MG/DL (ref 74–99)
HCT VFR BLD AUTO: 44.2 % (ref 37–54)
HGB BLD-MCNC: 14.5 G/DL (ref 12.5–16.5)
IMM GRANULOCYTES # BLD AUTO: 0.04 K/UL (ref 0–0.58)
IMM GRANULOCYTES NFR BLD: 0 % (ref 0–5)
LACTATE BLDV-SCNC: 1.6 MMOL/L (ref 0.5–1.9)
LYMPHOCYTES NFR BLD: 1.89 K/UL (ref 1.5–4)
LYMPHOCYTES RELATIVE PERCENT: 17 % (ref 20–42)
MAGNESIUM SERPL-MCNC: 2 MG/DL (ref 1.6–2.6)
MCH RBC QN AUTO: 28.5 PG (ref 26–35)
MCHC RBC AUTO-ENTMCNC: 32.8 G/DL (ref 32–34.5)
MCV RBC AUTO: 86.8 FL (ref 80–99.9)
MONOCYTES NFR BLD: 0.57 K/UL (ref 0.1–0.95)
MONOCYTES NFR BLD: 5 % (ref 2–12)
NEUTROPHILS NFR BLD: 76 % (ref 43–80)
NEUTS SEG NFR BLD: 8.31 K/UL (ref 1.8–7.3)
PLATELET # BLD AUTO: 242 K/UL (ref 130–450)
PMV BLD AUTO: 8.5 FL (ref 7–12)
POTASSIUM SERPL-SCNC: 4.2 MMOL/L (ref 3.5–5)
PROT SERPL-MCNC: 7.6 G/DL (ref 6.4–8.3)
RBC # BLD AUTO: 5.09 M/UL (ref 3.8–5.8)
SALICYLATES SERPL-MCNC: <0.3 MG/DL (ref 0–30)
SARS-COV-2 RDRP RESP QL NAA+PROBE: NOT DETECTED
SODIUM SERPL-SCNC: 136 MMOL/L (ref 132–146)
SPECIMEN DESCRIPTION: NORMAL
T4 FREE SERPL-MCNC: 1.3 NG/DL (ref 0.9–1.7)
TOXIC TRICYCLIC SC,BLOOD: NEGATIVE
TROPONIN I SERPL HS-MCNC: 11 NG/L (ref 0–22)
TSH SERPL DL<=0.05 MIU/L-ACNC: 0.43 UIU/ML (ref 0.27–4.2)
TSH SERPL DL<=0.05 MIU/L-ACNC: 0.49 UIU/ML (ref 0.27–4.2)
WBC OTHER # BLD: 10.9 K/UL (ref 4.5–11.5)

## 2025-05-24 PROCEDURE — 93005 ELECTROCARDIOGRAM TRACING: CPT

## 2025-05-24 PROCEDURE — 85025 COMPLETE CBC W/AUTO DIFF WBC: CPT

## 2025-05-24 PROCEDURE — 6360000004 HC RX CONTRAST MEDICATION: Performed by: RADIOLOGY

## 2025-05-24 PROCEDURE — 84484 ASSAY OF TROPONIN QUANT: CPT

## 2025-05-24 PROCEDURE — 83735 ASSAY OF MAGNESIUM: CPT

## 2025-05-24 PROCEDURE — 83605 ASSAY OF LACTIC ACID: CPT

## 2025-05-24 PROCEDURE — 87635 SARS-COV-2 COVID-19 AMP PRB: CPT

## 2025-05-24 PROCEDURE — 96360 HYDRATION IV INFUSION INIT: CPT

## 2025-05-24 PROCEDURE — 83880 ASSAY OF NATRIURETIC PEPTIDE: CPT

## 2025-05-24 PROCEDURE — 80053 COMPREHEN METABOLIC PANEL: CPT

## 2025-05-24 PROCEDURE — 71275 CT ANGIOGRAPHY CHEST: CPT

## 2025-05-24 PROCEDURE — 83835 ASSAY OF METANEPHRINES: CPT

## 2025-05-24 PROCEDURE — 93010 ELECTROCARDIOGRAM REPORT: CPT | Performed by: INTERNAL MEDICINE

## 2025-05-24 PROCEDURE — 2580000003 HC RX 258

## 2025-05-24 PROCEDURE — 87040 BLOOD CULTURE FOR BACTERIA: CPT

## 2025-05-24 PROCEDURE — 99285 EMERGENCY DEPT VISIT HI MDM: CPT

## 2025-05-24 PROCEDURE — 84443 ASSAY THYROID STIM HORMONE: CPT

## 2025-05-24 RX ORDER — IOPAMIDOL 755 MG/ML
75 INJECTION, SOLUTION INTRAVASCULAR
Status: COMPLETED | OUTPATIENT
Start: 2025-05-24 | End: 2025-05-24

## 2025-05-24 RX ORDER — 0.9 % SODIUM CHLORIDE 0.9 %
1000 INTRAVENOUS SOLUTION INTRAVENOUS ONCE
Status: COMPLETED | OUTPATIENT
Start: 2025-05-24 | End: 2025-05-24

## 2025-05-24 RX ADMIN — SODIUM CHLORIDE 1000 ML: 0.9 INJECTION, SOLUTION INTRAVENOUS at 21:46

## 2025-05-24 RX ADMIN — IOPAMIDOL 75 ML: 755 INJECTION, SOLUTION INTRAVENOUS at 23:28

## 2025-05-24 ASSESSMENT — PAIN - FUNCTIONAL ASSESSMENT: PAIN_FUNCTIONAL_ASSESSMENT: NONE - DENIES PAIN

## 2025-05-24 ASSESSMENT — LIFESTYLE VARIABLES
HOW OFTEN DO YOU HAVE A DRINK CONTAINING ALCOHOL: MONTHLY OR LESS
HOW MANY STANDARD DRINKS CONTAINING ALCOHOL DO YOU HAVE ON A TYPICAL DAY: 1 OR 2

## 2025-05-24 NOTE — ED PROVIDER NOTES
distended.  +BS.  No rebound, guarding, or rigidity. No pulsatile masses appreciated.  Musculoskeletal: Moves all extremities x 4. Warm and well perfused, no clubbing, cyanosis, or edema. Capillary refill <3 seconds  Skin: warm and dry. No rashes.   Neurologic: GCS 15, CN 2-12 grossly intact, no focal deficits, symmetric strength 5/5 in the upper and lower extremities bilaterally  Psych: Normal Affect    -------------------------------------------------- RESULTS -------------------------------------------------  I have personally reviewed all laboratory and imaging results for this patient. Results are listed below.     LABS:  Results for orders placed or performed during the hospital encounter of 05/23/25   CBC   Result Value Ref Range    WBC 11.3 4.5 - 11.5 k/uL    RBC 5.29 3.80 - 5.80 m/uL    Hemoglobin 15.0 12.5 - 16.5 g/dL    Hematocrit 45.3 37.0 - 54.0 %    MCV 85.6 80.0 - 99.9 fL    MCH 28.4 26.0 - 35.0 pg    MCHC 33.1 32.0 - 34.5 g/dL    RDW 13.6 11.5 - 15.0 %    Platelets 246 130 - 450 k/uL    MPV 9.2 7.0 - 12.0 fL   Basic Metabolic Panel   Result Value Ref Range    Sodium 134 132 - 146 mmol/L    Potassium 5.1 (H) 3.5 - 5.0 mmol/L    Chloride 98 98 - 107 mmol/L    CO2 24 22 - 29 mmol/L    Anion Gap 12 7 - 16 mmol/L    Glucose 144 (H) 74 - 99 mg/dL    BUN 22 (H) 6 - 20 mg/dL    Creatinine 1.4 (H) 0.70 - 1.20 mg/dL    Est, Glom Filt Rate 61 >60 mL/min/1.73m2    Calcium 9.2 8.6 - 10.2 mg/dL   CK   Result Value Ref Range    Total  20 - 200 U/L   Hepatic Function Panel   Result Value Ref Range    Albumin 3.7 3.5 - 5.2 g/dL    Alkaline Phosphatase 77 40 - 129 U/L    ALT 28 0 - 40 U/L    AST 40 (H) 0 - 39 U/L    Total Bilirubin 0.9 0.0 - 1.2 mg/dL    Bilirubin, Direct <0.2 0.0 - 0.3 mg/dL    Bilirubin, Indirect Can not be calculated 0.0 - 1.0 mg/dL    Total Protein 7.8 6.4 - 8.3 g/dL   Lipase   Result Value Ref Range    Lipase 18 13 - 60 U/L   Amylase   Result Value Ref Range    Amylase 48 20 - 100 U/L  NOTES AND VITALS REVIEWED ---------------------------   The nursing notes within the ED encounter and vital signs as below have been reviewed by myself.  BP (!) 150/80   Pulse 79   Temp 98.1 °F (36.7 °C) (Oral)   Resp 18   Wt (!) 176.9 kg (390 lb)   SpO2 94%   BMI 47.47 kg/m²   Oxygen Saturation Interpretation: Normal    The patient’s available past medical records and past encounters were reviewed.        ------------------------------ ED COURSE/MEDICAL DECISION MAKING----------------------  Medications   aspirin chewable tablet 324 mg (324 mg Oral Given 5/23/25 2047)             Medical Decision Making:       John Otoole is a 59 y.o. male presenting to the ED for intermittent diaphoresis no chest pain no shortness of breath patient is very morbidly obese also has COPD and has a history of smoking denies any ever have any chest pain shortness of breath headache fevers vomiting stiff neck no numbness in his arms or legs his EKG did not show any acute changes his potassium was slightly high but was hemolyzed and the EKG did not show any changes 2 sets of troponins were negative even though I told him that we did not find a thing I did he I told he needs to follow-up with a cardiologist as soon as possible I gave him an aspirin told to take an aspirin every day and to also follow-up with a pulmonologist as soon as possible he was asymptomatic on reevaluation and wanted to go home please note that he did not want to be admitted but I told him he should follow-up with cardiology and pulmonary as soon as possible since he does have risk factors for heart disease I also told him his blood sugar was elevated and need to be followed up as soon as possible that he most likely is a prediabetic we will also he did not want to wait for his urinalysis he said he would follow this up as an outpatient as well as he is asymptomatic on reevaluation vital signs stable, beginning days ago.  The complaint has been persistent,

## 2025-05-24 NOTE — DISCHARGE INSTRUCTIONS
Take an aspirin every day make sure you follow-up with a cardiologist as soon as possible as well as a pulmonologist return if any chest pain shortness of breath or worsening symptoms

## 2025-05-25 NOTE — DISCHARGE INSTRUCTIONS
Please call and follow-up with your family doctor and endocrinologist.  Return to the ED if your symptoms worsen.
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
## 2025-05-27 LAB
EKG ATRIAL RATE: 80 BPM
EKG P AXIS: 16 DEGREES
EKG P-R INTERVAL: 174 MS
EKG Q-T INTERVAL: 406 MS
EKG QRS DURATION: 102 MS
EKG QTC CALCULATION (BAZETT): 468 MS
EKG R AXIS: -12 DEGREES
EKG T AXIS: 19 DEGREES
EKG VENTRICULAR RATE: 80 BPM

## 2025-05-27 PROCEDURE — 93010 ELECTROCARDIOGRAM REPORT: CPT | Performed by: INTERNAL MEDICINE

## 2025-05-30 LAB
MICROORGANISM SPEC CULT: NORMAL
MICROORGANISM SPEC CULT: NORMAL
SERVICE CMNT-IMP: NORMAL
SERVICE CMNT-IMP: NORMAL
SPECIMEN DESCRIPTION: NORMAL
SPECIMEN DESCRIPTION: NORMAL

## 2025-06-01 LAB
COLLECT DURATION TIME SPEC: 90 H
CREAT 24H UR-MCNC: 156 MG/DL
CREATININE URINE /24 HR: NORMAL MG/D (ref 800–2100)
METANEPHRINE UF INTERPRETATION: NORMAL
METANEPHRINE UG/G CRE: 53 UG/G CRT (ref 0–300)
METANEPHRINES 24 HOUR URINE: NORMAL UG/D (ref 55–320)
METANEPHRINES, URINE (UMOL/L): 83 UG/L
NORMETANEPHRINE, (G/CRT): 351 UG/G CRT (ref 0–400)
NORMETANEPHRINE, (NMOL/DAY): NORMAL UG/D (ref 114–865)
NORMETANEPHRINES, NMOL/L: 547 UG/L
SPECIMEN VOL ?TM UR: 90 ML

## 2025-07-01 ENCOUNTER — TELEPHONE (OUTPATIENT)
Dept: PHYSICAL THERAPY | Age: 60
End: 2025-07-01

## 2025-07-07 ENCOUNTER — OFFICE VISIT (OUTPATIENT)
Dept: ORTHOPEDIC SURGERY | Age: 60
End: 2025-07-07
Payer: MEDICAID

## 2025-07-07 VITALS
HEIGHT: 75 IN | DIASTOLIC BLOOD PRESSURE: 98 MMHG | HEART RATE: 96 BPM | BODY MASS INDEX: 39.17 KG/M2 | TEMPERATURE: 96.9 F | WEIGHT: 315 LBS | OXYGEN SATURATION: 96 % | SYSTOLIC BLOOD PRESSURE: 138 MMHG

## 2025-07-07 DIAGNOSIS — M25.562 LEFT KNEE PAIN, UNSPECIFIED CHRONICITY: Primary | ICD-10-CM

## 2025-07-07 PROCEDURE — 3080F DIAST BP >= 90 MM HG: CPT | Performed by: ORTHOPAEDIC SURGERY

## 2025-07-07 PROCEDURE — G8427 DOCREV CUR MEDS BY ELIG CLIN: HCPCS | Performed by: ORTHOPAEDIC SURGERY

## 2025-07-07 PROCEDURE — 3075F SYST BP GE 130 - 139MM HG: CPT | Performed by: ORTHOPAEDIC SURGERY

## 2025-07-07 PROCEDURE — 99204 OFFICE O/P NEW MOD 45 MIN: CPT | Performed by: ORTHOPAEDIC SURGERY

## 2025-07-07 PROCEDURE — 20610 DRAIN/INJ JOINT/BURSA W/O US: CPT | Performed by: ORTHOPAEDIC SURGERY

## 2025-07-07 PROCEDURE — 1036F TOBACCO NON-USER: CPT | Performed by: ORTHOPAEDIC SURGERY

## 2025-07-07 PROCEDURE — 3017F COLORECTAL CA SCREEN DOC REV: CPT | Performed by: ORTHOPAEDIC SURGERY

## 2025-07-07 PROCEDURE — G8417 CALC BMI ABV UP PARAM F/U: HCPCS | Performed by: ORTHOPAEDIC SURGERY

## 2025-07-07 RX ORDER — TRIAMCINOLONE ACETONIDE 40 MG/ML
40 INJECTION, SUSPENSION INTRA-ARTICULAR; INTRAMUSCULAR ONCE
Status: COMPLETED | OUTPATIENT
Start: 2025-07-07 | End: 2025-07-07

## 2025-07-07 RX ORDER — BUPIVACAINE HYDROCHLORIDE 2.5 MG/ML
2 INJECTION, SOLUTION INFILTRATION; PERINEURAL ONCE
Status: COMPLETED | OUTPATIENT
Start: 2025-07-07 | End: 2025-07-07

## 2025-07-07 RX ADMIN — TRIAMCINOLONE ACETONIDE 40 MG: 40 INJECTION, SUSPENSION INTRA-ARTICULAR; INTRAMUSCULAR at 16:36

## 2025-07-07 RX ADMIN — BUPIVACAINE HYDROCHLORIDE 5 MG: 2.5 INJECTION, SOLUTION INFILTRATION; PERINEURAL at 16:36
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
## 2025-07-16 ENCOUNTER — HOSPITAL ENCOUNTER (OUTPATIENT)
Dept: PHYSICAL THERAPY | Age: 60
Setting detail: THERAPIES SERIES
Discharge: HOME | End: 2025-07-16
Payer: MEDICAID

## 2025-07-16 PROCEDURE — 97161 PT EVAL LOW COMPLEX 20 MIN: CPT | Performed by: PHYSICAL THERAPIST

## 2025-07-16 ASSESSMENT — PAIN SCALES - GENERAL: PAINLEVEL_OUTOF10: 8

## 2025-07-16 ASSESSMENT — PAIN DESCRIPTION - LOCATION: LOCATION: BACK;NECK

## 2025-07-16 NOTE — PROGRESS NOTES
Pain: Yes  Pain Assessment: 0-10  Pain Level: 8  Worst Pain Level: 10  Pain Location: Back, Neck    Functional Status    Dominant Hand: : Left       OBJECTIVE EXAMINATION   Restrictions:   None     Palpation:   Lumbar Spine Palpation: pain across L lower lumbar  region (L3-4)    Ambulation/Gait (if applicable):   Ambulation  Surface: Level tile  Device: No Device  Assistance: Independent  Quality of Gait: pt ambulates without AD. BLE ER with significant ankle pronation. L latearl trunk deviation throughout gait sequence    Balance Screen:   Balance  Posture: Fair (fwd head/rounded shoulders, kyphotic humping)  Sitting - Static: Good  Sitting - Dynamic: Good  Standing - Static: Good, -  Standing - Dynamic: Good, -    Neuro Screen:   Sensation      Sensation  Overall Sensation Status: WFL     Left AROM  Right AROM       LUE- WFL   LLE- WFL     RUE- WFL   RLE- WFL        Left Strength  Right Strength         Strength LUE  L Shoulder Flexion: 4+/5  L Shoulder ABduction: 4+/5  L Elbow Flexion: 4+/5  L Elbow Extension: 4+/5  Strength LLE  L Hip Flexion: 3+/5  L Hip ABduction: 3+/5  L Hip ADduction: 4-/5  L Knee Flexion: 4+/5  L Knee Extension: 4+/5  L Ankle Dorsiflexion: 4+/5    Strength RUE  R Shoulder Flexion: 4+/5  R Shoulder ABduction: 4+/5  R Elbow Flexion: 4+/5  R Elbow Extension: 4+/5  Strength RLE  R Hip Flexion: 3+/5  R Hip ABduction: 3+/5  R Hip ADduction: 4-/5  R Knee Flexion: 4+/5  R Knee Extension: 4+/5  R Ankle Dorsiflexion: 3+/5     Cervical Assessment     AROM Cervical Spine   Cervical spine general AROM: Limited 25% B SB/rot   Cervical Strength  Cervical Flexion: 4-/5  Cervical Extension: 4-/5  Cervical Right Lateral: 4-/5  Cervical Left Lateral: 4-/5  Cervical Right Rotation: 4-/5     Lumbar Assessment     AROM Lumbar Spine   Lumbar spine general AROM: Limited 25% all planes       Trunk Strength     Trunk Strength  Trunk Flexion: 3+/5  Trunk Extension: 3+/5       Special Tests:   Special Tests Lumbar

## 2025-07-29 ENCOUNTER — TRANSCRIBE ORDERS (OUTPATIENT)
Dept: ADMINISTRATIVE | Age: 60
End: 2025-07-29

## 2025-07-29 DIAGNOSIS — F17.211 CIGARETTE NICOTINE DEPENDENCE IN REMISSION: ICD-10-CM

## 2025-07-29 DIAGNOSIS — Z12.2 SCREENING FOR MALIGNANT NEOPLASM OF RESPIRATORY ORGAN: Primary | ICD-10-CM

## 2025-08-01 ENCOUNTER — TELEPHONE (OUTPATIENT)
Dept: PHYSICAL THERAPY | Age: 60
End: 2025-08-01

## 2025-08-05 ENCOUNTER — HOSPITAL ENCOUNTER (OUTPATIENT)
Dept: PHYSICAL THERAPY | Age: 60
Setting detail: THERAPIES SERIES
Discharge: HOME | End: 2025-08-05